# Patient Record
Sex: MALE | Race: WHITE | NOT HISPANIC OR LATINO | Employment: FULL TIME | URBAN - METROPOLITAN AREA
[De-identification: names, ages, dates, MRNs, and addresses within clinical notes are randomized per-mention and may not be internally consistent; named-entity substitution may affect disease eponyms.]

---

## 2022-11-28 ENCOUNTER — LAB REQUISITION (OUTPATIENT)
Dept: LAB | Facility: CLINIC | Age: 27
End: 2022-11-28
Payer: COMMERCIAL

## 2022-11-28 DIAGNOSIS — Z13.1 ENCOUNTER FOR SCREENING FOR DIABETES MELLITUS: ICD-10-CM

## 2022-11-28 DIAGNOSIS — Z13.6 ENCOUNTER FOR SCREENING FOR CARDIOVASCULAR DISORDERS: ICD-10-CM

## 2022-11-28 DIAGNOSIS — Z11.4 ENCOUNTER FOR SCREENING FOR HUMAN IMMUNODEFICIENCY VIRUS (HIV): ICD-10-CM

## 2022-11-28 LAB
CHOLEST SERPL-MCNC: 200 MG/DL
FASTING STATUS PATIENT QL REPORTED: NORMAL
GLUCOSE SERPL-MCNC: 91 MG/DL (ref 70–99)
HDLC SERPL-MCNC: 36 MG/DL
HIV 1+2 AB+HIV1 P24 AG SERPL QL IA: NONREACTIVE
LDLC SERPL CALC-MCNC: 96 MG/DL
NONHDLC SERPL-MCNC: 164 MG/DL
TRIGL SERPL-MCNC: 341 MG/DL

## 2022-11-28 PROCEDURE — 87389 HIV-1 AG W/HIV-1&-2 AB AG IA: CPT | Mod: ORL | Performed by: STUDENT IN AN ORGANIZED HEALTH CARE EDUCATION/TRAINING PROGRAM

## 2022-11-28 PROCEDURE — 82947 ASSAY GLUCOSE BLOOD QUANT: CPT | Mod: ORL | Performed by: STUDENT IN AN ORGANIZED HEALTH CARE EDUCATION/TRAINING PROGRAM

## 2022-11-28 PROCEDURE — 80061 LIPID PANEL: CPT | Mod: ORL | Performed by: STUDENT IN AN ORGANIZED HEALTH CARE EDUCATION/TRAINING PROGRAM

## 2023-11-24 ENCOUNTER — LAB REQUISITION (OUTPATIENT)
Dept: LAB | Facility: CLINIC | Age: 28
End: 2023-11-24
Payer: COMMERCIAL

## 2023-11-24 DIAGNOSIS — Z11.3 ENCOUNTER FOR SCREENING FOR INFECTIONS WITH A PREDOMINANTLY SEXUAL MODE OF TRANSMISSION: ICD-10-CM

## 2023-11-24 PROCEDURE — 86593 SYPHILIS TEST NON-TREP QUANT: CPT | Mod: ORL | Performed by: STUDENT IN AN ORGANIZED HEALTH CARE EDUCATION/TRAINING PROGRAM

## 2023-11-24 PROCEDURE — 86592 SYPHILIS TEST NON-TREP QUAL: CPT | Mod: ORL | Performed by: STUDENT IN AN ORGANIZED HEALTH CARE EDUCATION/TRAINING PROGRAM

## 2023-11-24 PROCEDURE — 87491 CHLMYD TRACH DNA AMP PROBE: CPT | Mod: ORL | Performed by: STUDENT IN AN ORGANIZED HEALTH CARE EDUCATION/TRAINING PROGRAM

## 2023-11-24 PROCEDURE — 86780 TREPONEMA PALLIDUM: CPT | Mod: ORL | Performed by: STUDENT IN AN ORGANIZED HEALTH CARE EDUCATION/TRAINING PROGRAM

## 2023-11-24 PROCEDURE — 87389 HIV-1 AG W/HIV-1&-2 AB AG IA: CPT | Mod: ORL | Performed by: STUDENT IN AN ORGANIZED HEALTH CARE EDUCATION/TRAINING PROGRAM

## 2023-11-25 LAB
C TRACH DNA SPEC QL PROBE+SIG AMP: NEGATIVE
HIV 1+2 AB+HIV1 P24 AG SERPL QL IA: NONREACTIVE
N GONORRHOEA DNA SPEC QL NAA+PROBE: NEGATIVE
T PALLIDUM AB SER QL: REACTIVE

## 2023-11-27 LAB
RPR SER QL: REACTIVE
RPR SER-TITR: NORMAL {TITER}

## 2023-12-22 ENCOUNTER — LAB REQUISITION (OUTPATIENT)
Dept: LAB | Facility: CLINIC | Age: 28
End: 2023-12-22
Payer: COMMERCIAL

## 2023-12-22 DIAGNOSIS — Z29.81 ENCOUNTER FOR HIV PRE-EXPOSURE PROPHYLAXIS: ICD-10-CM

## 2023-12-22 LAB
ANION GAP SERPL CALCULATED.3IONS-SCNC: 12 MMOL/L (ref 7–15)
BUN SERPL-MCNC: 13.2 MG/DL (ref 6–20)
CALCIUM SERPL-MCNC: 9.2 MG/DL (ref 8.6–10)
CHLORIDE SERPL-SCNC: 103 MMOL/L (ref 98–107)
CREAT SERPL-MCNC: 0.93 MG/DL (ref 0.67–1.17)
DEPRECATED HCO3 PLAS-SCNC: 25 MMOL/L (ref 22–29)
EGFRCR SERPLBLD CKD-EPI 2021: >90 ML/MIN/1.73M2
GLUCOSE SERPL-MCNC: 93 MG/DL (ref 70–99)
HBV CORE AB SERPL QL IA: NONREACTIVE
HBV SURFACE AB SERPL IA-ACNC: 5.52 M[IU]/ML
HBV SURFACE AB SERPL IA-ACNC: NONREACTIVE M[IU]/ML
HBV SURFACE AG SERPL QL IA: NONREACTIVE
HCV AB SERPL QL IA: NONREACTIVE
HIV 1+2 AB+HIV1 P24 AG SERPL QL IA: NONREACTIVE
POTASSIUM SERPL-SCNC: 4.1 MMOL/L (ref 3.4–5.3)
SODIUM SERPL-SCNC: 140 MMOL/L (ref 135–145)

## 2023-12-22 PROCEDURE — 80048 BASIC METABOLIC PNL TOTAL CA: CPT | Mod: ORL | Performed by: STUDENT IN AN ORGANIZED HEALTH CARE EDUCATION/TRAINING PROGRAM

## 2023-12-22 PROCEDURE — 86803 HEPATITIS C AB TEST: CPT | Mod: ORL | Performed by: STUDENT IN AN ORGANIZED HEALTH CARE EDUCATION/TRAINING PROGRAM

## 2023-12-22 PROCEDURE — 87340 HEPATITIS B SURFACE AG IA: CPT | Mod: ORL | Performed by: STUDENT IN AN ORGANIZED HEALTH CARE EDUCATION/TRAINING PROGRAM

## 2023-12-22 PROCEDURE — 87389 HIV-1 AG W/HIV-1&-2 AB AG IA: CPT | Mod: ORL | Performed by: STUDENT IN AN ORGANIZED HEALTH CARE EDUCATION/TRAINING PROGRAM

## 2023-12-22 PROCEDURE — 87491 CHLMYD TRACH DNA AMP PROBE: CPT | Mod: ORL | Performed by: STUDENT IN AN ORGANIZED HEALTH CARE EDUCATION/TRAINING PROGRAM

## 2023-12-22 PROCEDURE — 86704 HEP B CORE ANTIBODY TOTAL: CPT | Mod: ORL | Performed by: STUDENT IN AN ORGANIZED HEALTH CARE EDUCATION/TRAINING PROGRAM

## 2023-12-22 PROCEDURE — 86706 HEP B SURFACE ANTIBODY: CPT | Mod: ORL | Performed by: STUDENT IN AN ORGANIZED HEALTH CARE EDUCATION/TRAINING PROGRAM

## 2023-12-23 LAB
C TRACH DNA SPEC QL PROBE+SIG AMP: NEGATIVE
N GONORRHOEA DNA SPEC QL NAA+PROBE: NEGATIVE

## 2024-04-22 ENCOUNTER — HOSPITAL ENCOUNTER (EMERGENCY)
Facility: CLINIC | Age: 29
Discharge: OTHER INSTITUTION | End: 2024-04-23
Attending: EMERGENCY MEDICINE | Admitting: EMERGENCY MEDICINE
Payer: COMMERCIAL

## 2024-04-22 DIAGNOSIS — R45.851 SUICIDAL IDEATION: ICD-10-CM

## 2024-04-22 PROBLEM — F33.1 MAJOR DEPRESSIVE DISORDER, RECURRENT EPISODE, MODERATE (H): Status: ACTIVE | Noted: 2024-04-22

## 2024-04-22 PROBLEM — F84.0 AUTISTIC DISORDER, RESIDUAL STATE: Status: ACTIVE | Noted: 2024-04-22

## 2024-04-22 LAB
AMPHETAMINES UR QL SCN: NORMAL
BARBITURATES UR QL SCN: NORMAL
BENZODIAZ UR QL SCN: NORMAL
BZE UR QL SCN: NORMAL
CANNABINOIDS UR QL SCN: NORMAL
FENTANYL UR QL: NORMAL
OPIATES UR QL SCN: NORMAL
PCP QUAL URINE (ROCHE): NORMAL

## 2024-04-22 PROCEDURE — 80307 DRUG TEST PRSMV CHEM ANLYZR: CPT | Performed by: EMERGENCY MEDICINE

## 2024-04-22 PROCEDURE — 99285 EMERGENCY DEPT VISIT HI MDM: CPT

## 2024-04-22 PROCEDURE — 250N000013 HC RX MED GY IP 250 OP 250 PS 637: Performed by: EMERGENCY MEDICINE

## 2024-04-22 RX ORDER — LORAZEPAM 1 MG/1
1 TABLET ORAL EVERY 8 HOURS PRN
Status: DISCONTINUED | OUTPATIENT
Start: 2024-04-22 | End: 2024-04-23 | Stop reason: HOSPADM

## 2024-04-22 RX ORDER — LAMOTRIGINE 25 MG/1
25 TABLET ORAL 2 TIMES DAILY
Status: DISCONTINUED | OUTPATIENT
Start: 2024-04-22 | End: 2024-04-23

## 2024-04-22 RX ORDER — HYDROXYZINE HYDROCHLORIDE 25 MG/1
25 TABLET, FILM COATED ORAL 2 TIMES DAILY PRN
Status: ON HOLD | COMMUNITY
End: 2024-05-13

## 2024-04-22 RX ORDER — ACETAMINOPHEN 325 MG/1
650 TABLET ORAL EVERY 4 HOURS PRN
Status: DISCONTINUED | OUTPATIENT
Start: 2024-04-22 | End: 2024-04-23 | Stop reason: HOSPADM

## 2024-04-22 RX ORDER — DOXYCYCLINE 100 MG/1
100 CAPSULE ORAL 2 TIMES DAILY
Status: ON HOLD | COMMUNITY
Start: 2024-04-19 | End: 2024-05-13

## 2024-04-22 RX ORDER — PRAZOSIN HYDROCHLORIDE 2 MG/1
2 CAPSULE ORAL AT BEDTIME
COMMUNITY
End: 2024-09-25

## 2024-04-22 RX ORDER — BUPROPION HYDROCHLORIDE 150 MG/1
150 TABLET ORAL EVERY MORNING
COMMUNITY
End: 2024-04-22

## 2024-04-22 RX ORDER — LANOLIN ALCOHOL/MO/W.PET/CERES
3 CREAM (GRAM) TOPICAL
Status: DISCONTINUED | OUTPATIENT
Start: 2024-04-22 | End: 2024-04-23 | Stop reason: HOSPADM

## 2024-04-22 RX ORDER — OLANZAPINE 5 MG/1
10 TABLET, ORALLY DISINTEGRATING ORAL 2 TIMES DAILY PRN
Status: DISCONTINUED | OUTPATIENT
Start: 2024-04-22 | End: 2024-04-23 | Stop reason: HOSPADM

## 2024-04-22 RX ORDER — IBUPROFEN 600 MG/1
600 TABLET, FILM COATED ORAL EVERY 6 HOURS PRN
Status: DISCONTINUED | OUTPATIENT
Start: 2024-04-22 | End: 2024-04-23 | Stop reason: HOSPADM

## 2024-04-22 RX ORDER — HYDROXYZINE HYDROCHLORIDE 25 MG/1
25 TABLET, FILM COATED ORAL
Status: DISCONTINUED | OUTPATIENT
Start: 2024-04-22 | End: 2024-04-23

## 2024-04-22 RX ORDER — ARIPIPRAZOLE 5 MG/1
5 TABLET ORAL AT BEDTIME
COMMUNITY
End: 2024-09-25

## 2024-04-22 RX ORDER — ARIPIPRAZOLE 5 MG/1
5 TABLET ORAL AT BEDTIME
Status: DISCONTINUED | OUTPATIENT
Start: 2024-04-22 | End: 2024-04-23 | Stop reason: HOSPADM

## 2024-04-22 RX ORDER — LAMOTRIGINE 25 MG/1
25 TABLET ORAL 2 TIMES DAILY
Status: ON HOLD | COMMUNITY
End: 2024-05-13

## 2024-04-22 RX ADMIN — ARIPIPRAZOLE 5 MG: 5 TABLET ORAL at 23:10

## 2024-04-22 RX ADMIN — LAMOTRIGINE 25 MG: 25 TABLET ORAL at 23:10

## 2024-04-22 RX ADMIN — FLUOXETINE 20 MG: 20 CAPSULE ORAL at 23:11

## 2024-04-22 ASSESSMENT — ACTIVITIES OF DAILY LIVING (ADL)
ADLS_ACUITY_SCORE: 35
ADLS_ACUITY_SCORE: 33
ADLS_ACUITY_SCORE: 35
ADLS_ACUITY_SCORE: 35

## 2024-04-22 ASSESSMENT — COLUMBIA-SUICIDE SEVERITY RATING SCALE - C-SSRS
5. HAVE YOU STARTED TO WORK OUT OR WORKED OUT THE DETAILS OF HOW TO KILL YOURSELF? DO YOU INTEND TO CARRY OUT THIS PLAN?: YES
1. IN THE PAST MONTH, HAVE YOU WISHED YOU WERE DEAD OR WISHED YOU COULD GO TO SLEEP AND NOT WAKE UP?: YES
3. HAVE YOU BEEN THINKING ABOUT HOW YOU MIGHT KILL YOURSELF?: YES
6. HAVE YOU EVER DONE ANYTHING, STARTED TO DO ANYTHING, OR PREPARED TO DO ANYTHING TO END YOUR LIFE?: NO
2. HAVE YOU ACTUALLY HAD ANY THOUGHTS OF KILLING YOURSELF IN THE PAST MONTH?: YES

## 2024-04-22 NOTE — ED PROVIDER NOTES
History     Chief Complaint:  Suicidal       HPI     Pj Tucker is a 29 year old male with history of ADHD who presents to the ER for evaluation of suicidal ideation. The patient states he has been having suicidal thoughts for a long time but they have become worse recently. He reports that his thoughts have become more frequent. He states that he has a plan of jumping off a bridge but hasn't attempted it yet. He reports being very close to attempting. He has never been hospitalized in the past for his suicidal thoughts but came now because he wants help. He states that he is supposed to be taking antidepressants every day but for gets to take them some days. Denies homicidal thought, hallucinations, and alcohol use.    Independent Historian:   None - Patient Only    Review of External Notes:   None    Medications:    Fluoxetine  Prazosin  Hydroxyzine    Past Medical History:    Major depressive disorder  Autism    Past Surgical History:    No past surgical history on file.     Physical Exam   Patient Vitals for the past 24 hrs:   BP Temp Temp src Pulse Resp SpO2 Weight   04/22/24 1553 -- -- -- -- -- -- 113.5 kg (250 lb 3.6 oz)   04/22/24 1550 (!) 146/100 98.5  F (36.9  C) Temporal 108 18 95 % --        Physical Exam    HEENT:    Oropharynx is moist  Eyes:    Conjunctiva normal  Neck:     Supple, no meningismus.     CV:     Regular rate and rhythm.      No murmurs, rubs or gallops.    PULM:    Clear to auscultation bilateral.       No respiratory distress.      Good air exchange.  ABD:    Soft, non-tender, non-distended.       No rebound, guarding or rigidity.  MSK:     No gross deformity to all four extremities.   LYMPH:   No cervical lymphadenopathy.  NEURO:   Alert and oriented x 3.      Speech is clear with no aphasia.     Normal muscular tone, no tremor.  Skin:    Warm, dry and intact.    Psych:    Mood is depressed, affect is appropriate and congruent.     + suicidal ideation.     No delusions,  hallucinations.     Memory intact.      Emergency Department Course       Laboratory:  Labs Ordered and Resulted from Time of ED Arrival to Time of ED Departure - No data to display     Emergency Department Course & Assessments:    Interventions:  Medications   acetaminophen (TYLENOL) tablet 650 mg (has no administration in time range)   ibuprofen (ADVIL/MOTRIN) tablet 600 mg (has no administration in time range)   LORazepam (ATIVAN) tablet 1 mg (has no administration in time range)   OLANZapine zydis (zyPREXA) ODT tab 10 mg (has no administration in time range)   melatonin tablet 3 mg (has no administration in time range)   hydrOXYzine HCl (ATARAX) tablet 25 mg (has no administration in time range)        Assessments:   I obtained history and performed physical exam as noted above.     Independent Interpretation (X-rays, CTs, rhythm strip):  None    Consultations/Discussion of Management or Tests:    Consult with DEC -plan for inpatient psychiatric treatment       Social Determinants of Health affecting care:   None    Disposition:  Changed over to oncoming ED physician Dr. Espinoza    Impression & Plan      Medical Decision Makin-year-old male presents with suicidal ideation.  Patient has escalation of suicidal ideation with definitive plan of jumping off a bridge.  He cannot contract for safety.  Patient is agreeable to inpatient psychiatric treatment.  Patient evaluated by DEC who agrees with inpatient psychiatric bed placement.  Patient currently awaiting bed placement and will be changed over to oncoming ED physician.      Diagnosis:    ICD-10-CM    1. Suicidal ideation  R45.851            Discharge Medications:  New Prescriptions    No medications on file          Scribe Disclosure:  Maureen BRAVO, am serving as a scribe at 4:14 PM on 2024 to document services personally performed by Sigifredo Dhaliwal MD based on my observations and the provider's statements to me.     2024    Sigifredo Dhaliwal MD Matthews, Jeremiah R, MD  04/22/24 1782

## 2024-04-22 NOTE — ED TRIAGE NOTES
Pt arrives from home with increasing thoughts of SI. Pt has had these feeling for a few months but have gotten worse over the weekend. Pt has a plan but is not forthcoming with information on specific plan. Pt cooperative and states his therapist knows he was coming in today. VSS, A&Ox4     Triage Assessment (Adult)       Row Name 04/22/24 4847          Triage Assessment    Airway WDL WDL        Respiratory WDL    Respiratory WDL WDL        Skin Circulation/Temperature WDL    Skin Circulation/Temperature WDL WDL        Cardiac WDL    Cardiac WDL WDL        Peripheral/Neurovascular WDL    Peripheral Neurovascular WDL WDL        Cognitive/Neuro/Behavioral WDL    Cognitive/Neuro/Behavioral WDL WDL

## 2024-04-22 NOTE — ED NOTES
Pt has been searched by security. One belonging bag with keys, wallet, and shoes in DEC office. Pt allowed to keep phone, charging bank, and charging cord. Writer enforced to pt that we have the right to confiscate any of these belongings if he chooses to be unsafe or inappropriate with them.

## 2024-04-23 ENCOUNTER — TELEPHONE (OUTPATIENT)
Dept: BEHAVIORAL HEALTH | Facility: CLINIC | Age: 29
End: 2024-04-23
Payer: COMMERCIAL

## 2024-04-23 ENCOUNTER — HOSPITAL ENCOUNTER (INPATIENT)
Facility: CLINIC | Age: 29
LOS: 22 days | Discharge: PSYCHIATRIC OR SUBSTANCE USE FACILITY | DRG: 885 | End: 2024-05-15
Attending: PSYCHIATRY & NEUROLOGY | Admitting: STUDENT IN AN ORGANIZED HEALTH CARE EDUCATION/TRAINING PROGRAM
Payer: COMMERCIAL

## 2024-04-23 VITALS
DIASTOLIC BLOOD PRESSURE: 92 MMHG | HEIGHT: 68 IN | SYSTOLIC BLOOD PRESSURE: 133 MMHG | BODY MASS INDEX: 36.85 KG/M2 | WEIGHT: 243.17 LBS | RESPIRATION RATE: 20 BRPM | HEART RATE: 81 BPM | TEMPERATURE: 98.5 F | OXYGEN SATURATION: 97 %

## 2024-04-23 DIAGNOSIS — F33.1 MAJOR DEPRESSIVE DISORDER, RECURRENT EPISODE, MODERATE (H): Primary | ICD-10-CM

## 2024-04-23 PROBLEM — R45.851 SUICIDAL IDEATION: Status: ACTIVE | Noted: 2024-04-23

## 2024-04-23 LAB
ALBUMIN SERPL BCG-MCNC: 4.4 G/DL (ref 3.5–5.2)
ALP SERPL-CCNC: 61 U/L (ref 40–150)
ALT SERPL W P-5'-P-CCNC: 35 U/L (ref 0–70)
ANION GAP SERPL CALCULATED.3IONS-SCNC: 11 MMOL/L (ref 7–15)
AST SERPL W P-5'-P-CCNC: 22 U/L (ref 0–45)
BILIRUB SERPL-MCNC: 0.4 MG/DL
BUN SERPL-MCNC: 10.8 MG/DL (ref 6–20)
CALCIUM SERPL-MCNC: 9 MG/DL (ref 8.6–10)
CHLORIDE SERPL-SCNC: 102 MMOL/L (ref 98–107)
CREAT SERPL-MCNC: 0.98 MG/DL (ref 0.67–1.17)
DEPRECATED HCO3 PLAS-SCNC: 26 MMOL/L (ref 22–29)
EGFRCR SERPLBLD CKD-EPI 2021: >90 ML/MIN/1.73M2
ERYTHROCYTE [DISTWIDTH] IN BLOOD BY AUTOMATED COUNT: 12.3 % (ref 10–15)
FLUAV RNA SPEC QL NAA+PROBE: NEGATIVE
FLUBV RNA RESP QL NAA+PROBE: NEGATIVE
GLUCOSE SERPL-MCNC: 95 MG/DL (ref 70–99)
HCT VFR BLD AUTO: 41.5 % (ref 40–53)
HGB BLD-MCNC: 14.1 G/DL (ref 13.3–17.7)
MCH RBC QN AUTO: 28.7 PG (ref 26.5–33)
MCHC RBC AUTO-ENTMCNC: 34 G/DL (ref 31.5–36.5)
MCV RBC AUTO: 85 FL (ref 78–100)
PLATELET # BLD AUTO: 216 10E3/UL (ref 150–450)
POTASSIUM SERPL-SCNC: 3.7 MMOL/L (ref 3.4–5.3)
PROT SERPL-MCNC: 7.5 G/DL (ref 6.4–8.3)
RBC # BLD AUTO: 4.91 10E6/UL (ref 4.4–5.9)
RSV RNA SPEC NAA+PROBE: NEGATIVE
SARS-COV-2 RNA RESP QL NAA+PROBE: NEGATIVE
SODIUM SERPL-SCNC: 139 MMOL/L (ref 135–145)
WBC # BLD AUTO: 7.9 10E3/UL (ref 4–11)

## 2024-04-23 PROCEDURE — 99285 EMERGENCY DEPT VISIT HI MDM: CPT

## 2024-04-23 PROCEDURE — 80053 COMPREHEN METABOLIC PANEL: CPT | Performed by: EMERGENCY MEDICINE

## 2024-04-23 PROCEDURE — 250N000013 HC RX MED GY IP 250 OP 250 PS 637

## 2024-04-23 PROCEDURE — 87637 SARSCOV2&INF A&B&RSV AMP PRB: CPT | Performed by: EMERGENCY MEDICINE

## 2024-04-23 PROCEDURE — 99223 1ST HOSP IP/OBS HIGH 75: CPT | Mod: AI | Performed by: STUDENT IN AN ORGANIZED HEALTH CARE EDUCATION/TRAINING PROGRAM

## 2024-04-23 PROCEDURE — 250N000013 HC RX MED GY IP 250 OP 250 PS 637: Performed by: EMERGENCY MEDICINE

## 2024-04-23 PROCEDURE — 36415 COLL VENOUS BLD VENIPUNCTURE: CPT | Performed by: EMERGENCY MEDICINE

## 2024-04-23 PROCEDURE — 85027 COMPLETE CBC AUTOMATED: CPT | Performed by: EMERGENCY MEDICINE

## 2024-04-23 PROCEDURE — 124N000002 HC R&B MH UMMC

## 2024-04-23 RX ORDER — LAMOTRIGINE 100 MG/1
50 TABLET ORAL DAILY
Status: DISCONTINUED | OUTPATIENT
Start: 2024-04-24 | End: 2024-04-23

## 2024-04-23 RX ORDER — ACETAMINOPHEN 325 MG/1
325 TABLET ORAL EVERY 4 HOURS PRN
Status: DISCONTINUED | OUTPATIENT
Start: 2024-04-23 | End: 2024-05-15 | Stop reason: HOSPADM

## 2024-04-23 RX ORDER — POLYETHYLENE GLYCOL 3350 17 G/17G
17 POWDER, FOR SOLUTION ORAL DAILY PRN
Status: DISCONTINUED | OUTPATIENT
Start: 2024-04-23 | End: 2024-05-15 | Stop reason: HOSPADM

## 2024-04-23 RX ORDER — OLANZAPINE 10 MG/2ML
5 INJECTION, POWDER, FOR SOLUTION INTRAMUSCULAR 3 TIMES DAILY PRN
Status: DISCONTINUED | OUTPATIENT
Start: 2024-04-23 | End: 2024-05-15 | Stop reason: HOSPADM

## 2024-04-23 RX ORDER — ARIPIPRAZOLE 5 MG/1
5 TABLET ORAL AT BEDTIME
Status: CANCELLED | OUTPATIENT
Start: 2024-04-23

## 2024-04-23 RX ORDER — HYDROXYZINE HYDROCHLORIDE 25 MG/1
25 TABLET, FILM COATED ORAL 2 TIMES DAILY PRN
Status: DISCONTINUED | OUTPATIENT
Start: 2024-04-23 | End: 2024-05-15 | Stop reason: HOSPADM

## 2024-04-23 RX ORDER — PRAZOSIN HYDROCHLORIDE 1 MG/1
2 CAPSULE ORAL AT BEDTIME
Status: CANCELLED | OUTPATIENT
Start: 2024-04-23

## 2024-04-23 RX ORDER — LAMOTRIGINE 25 MG/1
25 TABLET ORAL 2 TIMES DAILY
Status: DISCONTINUED | OUTPATIENT
Start: 2024-04-23 | End: 2024-04-25

## 2024-04-23 RX ORDER — LAMOTRIGINE 25 MG/1
25 TABLET ORAL DAILY
Status: DISCONTINUED | OUTPATIENT
Start: 2024-04-24 | End: 2024-04-23 | Stop reason: HOSPADM

## 2024-04-23 RX ORDER — OLANZAPINE 5 MG/1
5 TABLET ORAL 3 TIMES DAILY PRN
Status: DISCONTINUED | OUTPATIENT
Start: 2024-04-23 | End: 2024-05-15 | Stop reason: HOSPADM

## 2024-04-23 RX ORDER — PRAZOSIN HYDROCHLORIDE 1 MG/1
2 CAPSULE ORAL AT BEDTIME
Status: DISCONTINUED | OUTPATIENT
Start: 2024-04-23 | End: 2024-04-23 | Stop reason: HOSPADM

## 2024-04-23 RX ORDER — PRAZOSIN HYDROCHLORIDE 2 MG/1
2 CAPSULE ORAL AT BEDTIME
Status: DISCONTINUED | OUTPATIENT
Start: 2024-04-23 | End: 2024-05-08

## 2024-04-23 RX ORDER — HYDROXYZINE HYDROCHLORIDE 25 MG/1
25 TABLET, FILM COATED ORAL 3 TIMES DAILY PRN
Status: DISCONTINUED | OUTPATIENT
Start: 2024-04-23 | End: 2024-04-23 | Stop reason: HOSPADM

## 2024-04-23 RX ORDER — ARIPIPRAZOLE 5 MG/1
5 TABLET ORAL AT BEDTIME
Status: DISCONTINUED | OUTPATIENT
Start: 2024-04-23 | End: 2024-04-29

## 2024-04-23 RX ADMIN — ARIPIPRAZOLE 5 MG: 5 TABLET ORAL at 21:30

## 2024-04-23 RX ADMIN — HYDROXYZINE HYDROCHLORIDE 25 MG: 25 TABLET, FILM COATED ORAL at 21:30

## 2024-04-23 RX ADMIN — LAMOTRIGINE 25 MG: 25 TABLET ORAL at 21:30

## 2024-04-23 RX ADMIN — PRAZOSIN HYDROCHLORIDE 2 MG: 2 CAPSULE ORAL at 21:30

## 2024-04-23 RX ADMIN — LAMOTRIGINE 25 MG: 25 TABLET ORAL at 08:10

## 2024-04-23 RX ADMIN — FLUOXETINE HYDROCHLORIDE 20 MG: 20 CAPSULE ORAL at 21:30

## 2024-04-23 ASSESSMENT — ACTIVITIES OF DAILY LIVING (ADL)
ADLS_ACUITY_SCORE: 35
ADLS_ACUITY_SCORE: 35
WEAR_GLASSES_OR_BLIND: YES
ADLS_ACUITY_SCORE: 30
ADLS_ACUITY_SCORE: 35
DIFFICULTY_EATING/SWALLOWING: NO
ADLS_ACUITY_SCORE: 35
ADLS_ACUITY_SCORE: 30
ADLS_ACUITY_SCORE: 35
ADLS_ACUITY_SCORE: 45
FALL_HISTORY_WITHIN_LAST_SIX_MONTHS: NO
ADLS_ACUITY_SCORE: 35
CHANGE_IN_FUNCTIONAL_STATUS_SINCE_ONSET_OF_CURRENT_ILLNESS/INJURY: NO
ADLS_ACUITY_SCORE: 35
DIFFICULTY_COMMUNICATING: NO
DRESSING/BATHING_DIFFICULTY: NO
ADLS_ACUITY_SCORE: 35
ADLS_ACUITY_SCORE: 30
ADLS_ACUITY_SCORE: 30
ADLS_ACUITY_SCORE: 35
DOING_ERRANDS_INDEPENDENTLY_DIFFICULTY: NO
ADLS_ACUITY_SCORE: 35
ADLS_ACUITY_SCORE: 35
TOILETING_ISSUES: NO
WALKING_OR_CLIMBING_STAIRS_DIFFICULTY: NO
CONCENTRATING,_REMEMBERING_OR_MAKING_DECISIONS_DIFFICULTY: NO

## 2024-04-23 ASSESSMENT — LIFESTYLE VARIABLES: SKIP TO QUESTIONS 9-10: 1

## 2024-04-23 NOTE — ED PROVIDER NOTES
Patient's care was signed out to me that he was voluntary admission for suicidal ideation.  The patient got a bed at [facility] and will be transferred by [facility] paramedics.  His care was signed out to Dr. Mccoy pending transfer.  No events during my shift.     Morelia Garcia MD  04/23/24 3305

## 2024-04-23 NOTE — ED PROVIDER NOTES
[facility] care 2155    [facility] rec completed and medication orders entered by [facility].    No acute events.     Lurdes Espinoza MD  04/23/24 5443

## 2024-04-23 NOTE — ED NOTES
Room search performed, removed vitals machine. Pt reports having suicidal thoughts with no plan.

## 2024-04-23 NOTE — CONSULTS
"Diagnostic Evaluation Consultation  Crisis Assessment    Patient Name: Pj Tucker  Age:  29 year old  Legal Sex: male  Gender Identity: male  Pronouns:   Race: White  Ethnicity: Not  or   Language: English      Patient was assessed: Virtual: [place] Crisis Assessment Start Time: 1830 Crisis Assessment Stop Time: 1903  Patient location: [facility] EMERGENCY DEPT                             ED08    Referral Data and Chief Complaint  Pj Tucker presents to the ED by  self. Patient is presenting to the ED for the following concerns: Suicidal ideation.   Factors that make the mental health crisis life threatening or complex are:  Pt presents for suicide ideation with a plan. Pt reports he has been struggling with suicide ideation for the past 2-3 months and reports the trigger to the suicide ideation, \"I have financial issues and I just feel like a burden to everyone in my life.\" Pt reports he has struggled with this for years, but has not told anyone until recently, \"I have been silently suffering for years, but have been opening up to my therapist and I told my parents as well.\" Pt reports he has a plan to jump off a bridge, and wants to do this at night, \"when no one is around. It's too crowded in the daytime.\" Pt reports he feels he is really impulsive naturally, and is starting to not trust himself. Pt reports he is worried he would leave his home and attempt to jump off the bridge. Pt reports he was at therapy earlier today and was encouraged to come to the hospital. Pt denies self-harm. Pt presents as flat, oriented, engaged, and anxious..      Informed Consent and Assessment Methods  Explained the crisis assessment process, including applicable information disclosures and limits to confidentiality, assessed understanding of the process, and obtained consent to proceed with the assessment.  Assessment methods included conducting a formal interview with patient, review of " medical records, collaboration with medical staff, and obtaining relevant collateral information from family and community providers when available.  : done     Patient response to interventions: acceptance expressed, verbalizes understanding  Coping skills were attempted to reduce the crisis:  talked to therapist and      History of the Crisis   Pt reports history of ADHD and Autism. Pt reports he is currently involved in therapy, case management, PCP, and psychiatry for medication review. Pt denies history of ED visits or inpatient psychiatric care for mental health concerns. Pt denies history of hallucinations or delusions. Pt reports history of of suicide ideation with plan, but did not follow through with his plan to jump off a bridge. Pt denies history of self-harm.    Brief Psychosocial History  Family:  Single, Children no  Support System:  Parent(s), Other (specify) (therapist, medication provider, PCP)  Employment Status:  employed full-time  Source of Income:  salary/wages  Financial Environmental Concerns:  other (see comments) (struggling to pay car payment)  Current Hobbies:  outdoor activities, exercise/fitness, spectator events, sports/team sports, group/social activities  Barriers in Personal Life:  financial concerns, mental health concerns    Significant Clinical History  Current Anxiety Symptoms:  anxious  Current Depression/Trauma:  helplessness, apathy, thoughts of death/suicide, hopelessness, sadness, difficulty concentrating, low self esteem, impaired decision making  Current Somatic Symptoms:     Current Psychosis/Thought Disturbance:     Current Eating Symptoms:     Chemical Use History:  Alcohol: Social  Last Use:: 02/08/24  Benzodiazepines: None  Opiates: None  Cocaine: None  Marijuana: None  Other Use: None   Past diagnosis:  Autism, ADHD  Family history:  ADHD, Autism  Past treatment:  Psychiatric Medication Management, Individual therapy, Case management, Primary  Care  Details of most recent treatment:  Pt reports he attends weekly therapy with Mindy @ [facility]. Pt reports he saw his therapist today, prior to coming to the ED. Pt reports he has a  thru [facility], Elbert Heard @ [facility]. Pt last spoke with his  today. Pt reports his medications are managed by a psychiatrist, Jazmin @ [facility]. Pt reports he last saw his psychiatrist last week for a medication review.  Other relevant history:  Pt reports he rents a room from a home with an older couple and their dog and guinea pigs. Pt reports he works full-time in a [employer] and reports he feels neutral about his employment. Pt denies history of legal issues and  involvement. Pt denies history of trauma.       Collateral Information  Is there collateral information: No (No emergency contact listed and pt does not recall phone numbers)     Collateral information name, relationship, phone number:       What happened today:       What is different about patient's functioning:       Concern about alcohol/drug use:      What do you think the patient needs:      Has patient made comments about wanting to kill themselves/others:      If d/c is recommended, can they take part in safety/aftercare planning:       Additional collateral information:        Risk Assessment  [place] Suicide Severity Rating Scale Full Clinical Version:  Suicidal Ideation  Q1 Wish to be Dead (Lifetime): Yes  Q2 Non-Specific Active Suicidal Thoughts (Lifetime): Yes  3. Active Suicidal Ideation with any Methods (Not Plan) Without Intent to Act (Lifetime): Yes  Q4 Active Suicidal Ideation with Some Intent to Act, Without Specific Plan (Lifetime): Yes  Q5 Active Suicidal Ideation with Specific Plan and Intent (Lifetime): Yes  Q6 Suicide Behavior (Lifetime): yes     Suicidal Behavior (Lifetime)  Actual Attempt (Lifetime): No  Has subject engaged in  non-suicidal self-injurious behavior? (Lifetime): No  Interrupted Attempts (Lifetime): No  Aborted or Self-Interrupted Attempt (Lifetime): No  Preparatory Acts or Behavior (Lifetime): Yes  Total Number of Preparatory Acts (Lifetime): 1  Preparatory Acts or Behavior Description (Lifetime): identified time of day, and place    [place] Suicide Severity Rating Scale Recent:   Suicidal Ideation (Recent)  Q1 Wished to be Dead (Past Month): yes  Q2 Suicidal Thoughts (Past Month): yes  Q3 Suicidal Thought Method: yes  Q4 Suicidal Intent without Specific Plan: yes  Q5 Suicide Intent with Specific Plan: yes  Level of Risk per Screen: high risk  Intensity of Ideation (Recent)  Most Severe Ideation Rating (Past 1 Month): 5  Frequency (Past 1 Month): Many times each day  Duration (Past 1 Month): More than 8 hours/persistent or continuous  Controllability (Past 1 Month): Unable to control thoughts  Deterrents (Past 1 Month): Uncertain that deterrents stopped you  Reasons for Ideation (Past 1 Month): Mostly to end or stop the pain (You couldn't go on living with the pain or how you were feeling)  Suicidal Behavior (Recent)  Actual Attempt (Past 3 Months): No  Has subject engaged in non-suicidal self-injurious behavior? (Past 3 Months): No  Interrupted Attempts (Past 3 Months): No  Aborted or Self-Interrupted Attempt (Past 3 Months): No  Preparatory Acts or Behavior (Past 3 Months): Yes  Preparatory Acts or Behavior Description (Past 3 Months): identified time of day and place    Environmental or Psychosocial Events: excessive debt, poor finances, other life stressors, social isolation, helplessness/hopelessness, impulsivity/recklessness  Protective Factors: Protective Factors: lives in a responsibly safe and stable environment, good treatment engagement, sense of importance of health and wellness, able to access care without barriers, supportive ongoing medical and mental health care relationships, help seeking, constructive use  of leisure time, enjoyable activities, resilience    Does the patient have thoughts of harming others? Feels Like Hurting Others: no  Previous Attempt to Hurt Others: no  Is the patient engaging in sexually inappropriate behavior?: no    Is the patient engaging in sexually inappropriate behavior?  no        Mental Status Exam   Affect: Flat  Appearance: Appropriate  Attention Span/Concentration: Attentive  Eye Contact: Engaged    Fund of Knowledge: Appropriate   Language /Speech Content: Fluent  Language /Speech Volume: Normal  Language /Speech Rate/Productions: Normal  Recent Memory: Intact  Remote Memory: Intact  Mood: Anxious  Orientation to Person: Yes   Orientation to Place: Yes  Orientation to Time of Day: Yes  Orientation to Date: Yes     Situation (Do they understand why they are here?): Yes  Psychomotor Behavior: Normal  Thought Content: Suicidal  Thought Form: Intact     Mini-Cog Assessment  Number of Words Recalled:    Clock-Drawing Test:     Three Item Recall:    Mini-Cog Total Score:       Medication  Psychotropic medications:   Medication Orders - Psychiatric (From admission, onward)      Start     Dose/Rate Route Frequency Ordered Stop    04/22/24 2038  hydrOXYzine HCl (ATARAX) tablet 25 mg         25 mg Oral AT BEDTIME PRN 04/22/24 2038 04/22/24 2038  OLANZapine zydis (zyPREXA) ODT tab 10 mg         10 mg Oral 2 TIMES DAILY PRN 04/22/24 2038 04/22/24 2038  LORazepam (ATIVAN) tablet 1 mg         1 mg Oral EVERY 8 HOURS PRN 04/22/24 2038               Current Care Team  Patient Care Team:  Cl[facility], [facility] as PCP - General (Certified Registered)    Diagnosis  Patient Active Problem List   Diagnosis    Major depressive disorder, recurrent episode, moderate (H)    Autistic disorder, residual state       Primary Problem This Admission  Active Hospital Problems    *Major depressive disorder, recurrent episode, moderate (H)      Autistic disorder, residual state        Clinical  Summary and Substantiation of Recommendations   It is the recommendation of this clinician that pt admit to [facility] for safety and stabilization. Pt displays the following risk factors that support IP admission: Pt presents with suicide ideation with plan to jump off a bridge at night, with means, and intent. Pt reports he has been feeling this way for 2-3 months, but now feels impulsive and can no longer trust himself. Pt recently had a medication review, where a medication was added 1 week ago. Pt has limited social supports and has not told family about suicide ideation until today, after therapy. Pt is unable to engage in safety planning to mitigate risk level in a non-secure setting. Lower levels of care are not sufficient. Due to this IP is the least restrictive option of care for pt. Pt should remain in IP until deemed safe to return to the community and engage in [facility] supports. Pt would benefit from outpatient programmatic care with DBT referral.       Imminent risk of harm: Suicidal Behavior  Severe psychiatric, behavioral or other comorbid conditions are appropriate for management at inpatient mental health as indicated by at least one of the following: Impaired impulse control, judgement, or insight, Psychiatric Symptoms  Severe dysfunction in daily living is present as indicated by at least one of the following: Complete withdrawal from all social interactions, Other evidence of severe dysfunction  Situation and expectations are appropriate for inpatient care: Biopsychosocial stresses potentially contributing to clinical presentation (co morbidities) have been assessed and are absent or manageable at proposed level of care  Inpatient mental health services are necessary to meet patient needs and at least one of the following: Specific condition related to admission diagnosis is present and judged likely to further improve at proposed level of care      Patient coping skills attempted to reduce the crisis:   talked to therapist and     Disposition  Recommended disposition: Inpatient Mental Health        Reviewed case and recommendations with attending provider. Attending Name: Dr. Dhaliwal       Attending concurs with disposition: yes       Patient and/or validated legal guardian concurs with disposition:   yes       Final disposition:  inpatient mental health    Legal status on admission: Voluntary/Patient has signed consent for treatment    Assessment Details   Total duration spent with the patient: 33 min     CPT code(s) utilized: 18653 - Psychotherapy for Crisis - 60 (30-74*) min    NOBLE Singletary, ANGELC, Psychotherapist  DEC - Triage & Transition Services  Callback: [phone]

## 2024-04-23 NOTE — ED NOTES
RN ED Mental Health Handoff Note    Voluntary    Does patient require 1:1? Yes    Hold and rights been given and documented for patient: Yes    Is the patient in BH scrubs? No -na    Has the patient been searched? Yes    Is the 15 minute observation tool up to date? Yes    Was patient issued a welcome folder? Yes    Room check completed this shift: No    PSS3 and [place] Assessment/Reassessment this shift:    C-SSRS ([place])      Date and Time Q1 Wished to be Dead (Past Month) Q2 Suicidal Thoughts (Past Month) Q3 Suicidal Thought Method Q4 Suicidal Intent without Specific Plan Q5 Suicide Intent with Specific Plan Q6 Suicide Behavior (Lifetime) Within the Past 3 Months? Level of Risk per Screen Level of Risk per Screen User   04/22/24 1937 1-->yes 1-->yes 1-->yes 1-->yes 1-->yes -- -- -- high risk LF   04/22/24 1936 -- -- -- -- -- 1-->yes -- -- -- LF   04/22/24 1609 1-->yes 1-->yes 1-->yes -- 1-->yes 0-->no -- -- high risk RD   04/22/24 1551 1-->yes 1-->yes 1-->yes -- 1-->yes 0-->no -- -- high risk NAW            Behavioral status of patient: Green    Code 21 called this shift? No    Use of restraints/seclusion this shift? No    Most recent vital signs:  Temp: 98.5  F (36.9  C) Temp src: Temporal BP: (!) 146/100 Pulse: 108   Resp: 18 SpO2: 95 %        Medications:  Scheduled medication compliance? Yes    PRN Meds administered this shift? No    Medications   acetaminophen (TYLENOL) tablet 650 mg (has no administration in time range)   ibuprofen (ADVIL/MOTRIN) tablet 600 mg (has no administration in time range)   LORazepam (ATIVAN) tablet 1 mg (has no administration in time range)   OLANZapine zydis (zyPREXA) ODT tab 10 mg (has no administration in time range)   melatonin tablet 3 mg (has no administration in time range)   hydrOXYzine HCl (ATARAX) tablet 25 mg (has no administration in time range)   ARIPiprazole (ABILIFY) tablet 5 mg (5 mg Oral $Given 4/22/24 4171)   FLUoxetine (PROzac) capsule 20 mg (20 mg Oral  $Given 4/22/24 2311)   lamoTRIgine (LaMICtal) tablet 25 mg (25 mg Oral $Given 4/22/24 1730)         ADLs    Meal Provided this shift? Yes    Hygiene items provided? Yes    ADLs completed? Yes    Date of last shower: unknown    Any significant events this shift? No    Any information that would be helpful in caring for this patient?  No    Family present/updated? Yes    Location of patient's belongings: in DEC office.  Pt has phone and  in room.     Critical Care Minutes:  Does the patient need critical care minutes documented? No

## 2024-04-23 NOTE — PROGRESS NOTES
"Triage & Transition Services, Extended Care     Therapy Progress Note    Patient: Pj goes by \"Pj,\" uses he/him pronouns  Date of Service: April 23, 2024  Site of Service: [facility] EMERGENCY DEPT                             ED08  Patient was seen yes  Mode of Assessment: In person    Presentation Summary: Writer met with the patient alongside psychiatry provider YUDITH Castro, CNP.  Writer entered the patient's room, introduced self and explained role.  Patient was awake and alert, sitting up on the gurney.  Patient spoke aboiut worsening depression, he said he has been expereincing SI for a few years now, but it has worsened over the past few weeks, he reports expereincing intrusive Si with thoughts of a plan to jump off of a bridge.  Patient said he has lived with an older couple who are in their 60s for a few years, he notes some issues at home saying he has ASD and there are \"communication issues\".  Patient said he finally started talking about si becuase \"I'm tired of suffering in silence\".  Patient cotninues to endorse SI, though denies previous attempts or psychiatric admissions.  Patient said he was previously concerned about anxiety and worsening depression, though now he is more concerned about intrusive thoughts.  Patient notes he has had periods with excessive spending and increased sexual activitiy saying he is \"bisexual\".  Patient described having thoughts of stabbing himself with a knife at home, he said the first time the sog stopped him and the second time his roommates came home.  Patient continues to endorse Si, he denies NSSI.HI.  Patient denies AH/VH, denies command hallucinations.  Reviewed plan of care with the patient, he continues to consent to treatment and is voluntary.    Therapeutic Intervention(s) Provided: Reviewed healthy living that supports positive mental health, including looking at sleep hygiene, regular movement, nutrition, and regular " socialization., Provided positive reinforcement for progress towards goals, gains in knowledge, and application of skills previously taught., Taught the link between thoughts, feelings, and behaviors., Coached on coping techniques/relaxation skills to help improve distress tolerance and managing intense emotions.    Current Symptoms: racing thoughts, anxious sense of doom, difficulty concentrating, withdrawl/isolation, impaired decision making, hoplessness, sadness, thoughts of death/suicide anxious, racing thoughts impulsive      Mental Status Exam   Affect: Flat  Appearance: Appropriate  Attention Span/Concentration: Attentive  Eye Contact: Engaged    Fund of Knowledge: Appropriate   Language /Speech Content: Fluent  Language /Speech Volume: Normal  Language /Speech Rate/Productions: Normal  Recent Memory: Intact  Remote Memory: Intact  Mood: Anxious, Depressed  Orientation to Person: Yes   Orientation to Place: Yes  Orientation to Time of Day: Yes  Orientation to Date: Yes     Situation (Do they understand why they are here?): Yes  Psychomotor Behavior: Normal  Thought Content: Suicidal  Thought Form: Intact    Treatment Objective(s) Addressed: rapport building, processing feelings, identifying an appropriate aftercare plan, assessing safety, identifying additional supports, exploring obstacles to safety in the community    Patient Response to Interventions: acceptance expressed, verbalizes understanding    Progress Towards Goals: Patient Reports Symptoms Are: ongoing  Patient Progress Toward Goals: is making progress  Comment: Patient appropriately engaged in therapeutic check-in, patient's behavior has been calm and cooperative.  Next Step to Work Toward Discharge: symptom stabilization, patient ability to engage in safety planning, engaging in safety planning with collateral sources      Plan: inpatient mental health  yes provider, RN MD- Morelia Garcia, VIRI- Nani/Lydia       Clinical Substantiation: Continue  "to recommend IP MH admission for further safety and stablization.  Patient continues to endorse SI with intrusive thoughts of jumping off of a bridge.  Per initial consult \"Pt presents with suicide ideation with plan to jump off a bridge at night, with means, and intent. Pt reports he has been feeling this way for 2-3 months, but now feels impulsive and can no longer trust himself. Pt recently had a medication review, where a medication was added 1 week ago. Pt has limited social supports and has not told family about suicide ideation until today, after therapy. Pt is unable to engage in safety planning to mitigate risk level in a non-secure setting. Lower levels of care are not sufficient. Due to this IP is the least restrictive option of care for pt. Pt should remain in IP until deemed safe to return to the community and engage in OP MH supports. Pt would benefit from outpatient programmatic care with DBT referral.\"    Legal Status: Legal Status at Admission: Voluntary/Patient has signed consent for treatment    Session Status: Time session started: 0925  Time session ended: 1000  Session Duration (minutes): 35 minutes  Session Number: 1  Anticipated number of sessions or this episode of care: 4    Time Spent: 35 minutes    CPT Code: CPT Codes: 12750 - Psychotherapy (with patient) - 30 (16-37*) min    Diagnosis:   Patient Active Problem List   Diagnosis    Major depressive disorder, recurrent episode, moderate (H)    Autistic disorder, residual state       Primary Problem This Admission:     Major depressive disorder, recurrent episode, moderate (H) F33.1    Autistic disorder, residual state F84.0    PHOENIX Villanueva   Licensed Mental Health Professional (LMHP), [facility]  [phone]         "

## 2024-04-23 NOTE — LETTER
[facility]  [address] [address]  Phone: [phone]    April 24, 2024      Pj Tucker  [address] [place] [id number]        To whom it may concern:    RE: Pj Tucker    Please excuse Pj Tucker from work beginning 4/22/2024. Pj is currently admitted at [facility] for ongoing medical treatment. An updated note will be provided upon discharge.    Please contact me for questions or concerns.    Sincerely,      Johnny Jay MD
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
## 2024-04-23 NOTE — TELEPHONE ENCOUNTER
R:  20/Sky  [facility] Access Inpatient Bed Call Log 4/23/2024 8:01:58 AM    Intake has called facilities that have not updated their bed status within the last 12 hours.    ADULTS:  [facility] is posting 0 beds.              [facility] is posting 0 beds. [phone] 8:10a No answer.    [facility] is posting 0 beds. [phone]              [facility] is posting 0 beds. [phone] 8:09a Per Wandy, at capacity.    [facility] is posting 0 beds. [phone]   [facility] (These are Young Adult beds, 18-28) is posting 0 beds. Negative COVID test required, no recent or significant aggression, violence, or sexual assault. [phone] 8:03a Per Janette, have a child bed, some adolescent and no YA.  [facility] is posting 0 beds. [phone]            [facility] is posting 0 beds. [phone]     [facility] through [facility] is posting 0 beds. [phone]         [facility] is posting 5 beds. Mixed unit 12+. Low acuity only.  DIRECT: [phone]    Per call with Rose @11:41 AM They can review Pt; please fax over clinical  [facility] is positing 0 beds. No aggression.  [phone]         [facility] is posting 0 beds. [phone] 8:14a Per pre-recording, no availability.  [facility] is posting 2 beds. Low acuity only. [phone]      [facility] is posting 1 beds. Low acuity. No current aggression. [phone]   [facility] is posting 0 beds. Low acuity. [phone]      Pt remains on work list pending appropriate bed availability.     Faxed clinical to [facility]    12:24 PM Paged unit 20 Provider    1:05 PM [facility] accepted Pt for 20 M bed    Updated worklist and added to the admit board and did Transfer Center and Bed planning and Indicia    1:42 PM Updated unit 20    1:45 PM Updated Veronica

## 2024-04-23 NOTE — TELEPHONE ENCOUNTER
S: [facility] ED , DEC  Diane  calling at 12:02am about a 29 year old/Male presenting with SI with plan to kill self by jumping off bridge at night when no one's around. Feeling impulsive and doesn't feel safe. Therapist recommended he be seen for IPMH.      B: Pt arrived via Self . Presenting problem, stressors: Financial issues has exacerbated his feelings of suicidal ideation over the years.     Pt affect in ED: Anxious  and Flat  Pt Dx: ADHD and Autism Spectrum Disorder - high functioning  Previous IPMH hx? No  Pt endorses SI with a plan to jump off a bridge    Hx of suicide attempt? No  Pt denies SIB  Pt denies HI   Pt denies hallucinations .   Pt RARS Score: 4    Hx of aggression/violence, sexual offenses, legal concerns, Epic care plan? describe: none  Current concerns for aggression this visit? No  Does pt have a history of Civil Commitment? No  Is Pt their own guardian? Yes    Pt is prescribed medication. Is patient medication compliant? Yes  Pt endorses OP services: Psychiatrist, Therapist, and   CD concerns: None  Acute or chronic medical concerns: none  Does Pt present with specific needs, assistive devices, or exclusionary criteria? None      Pt is not ambulatory  Pt is not able to perform ADLs independently      A: Pt to be reviewed for IPMH admission. Pt is Voluntary  Preferred placement: [facility]    COVID Symptoms: No  If yes, COVID test required   Utox: Negative   CMP: Ordered, not yet collected   CBC: Ordered, not yet collected   HCG: N/A    R: Patient cleared and ready for behavioral bed placement: Yes  Pt placed on IPMH worklist? Yes    Does Patient need a Transfer Center request created? Yes, writer completed Transfer Center request at:  8:39pm by ED provider

## 2024-04-23 NOTE — CONSULTS
Initial Psychiatric Consult   Consult date: April 23, 2024         Reason for Consult, requesting source:    Recommendations    Requesting source: Morelia Garcia    Labs and imaging reviewed. Consultation with Mitra Fung, tyson [facility], nursing staff and Dr. Garcia       Video-Visit Details    Type of service:  Video Visit    Video Start Time (time video started): 9:35am    Video End Time (time video stopped): 9:50am    Originating Location (pt. Location): [facility]    Distant Location (provider location):  Off-site    Mode of Communication:  Video Conference via YUDITH Issa CNP         HPI:   Per HPI from Dr. Dhaliwal on 4/22/24:  Pj Tucker is a 29 year old male with history of ADHD who presents to the ER for evaluation of suicidal ideation. The patient states he has been having suicidal thoughts for a long time but they have become worse recently. He reports that his thoughts have become more frequent. He states that he has a plan of jumping off a bridge but hasn't attempted it yet. He reports being very close to attempting. He has never been hospitalized in the past for his suicidal thoughts but came now because he wants help. He states that he is supposed to be taking antidepressants every day but for gets to take them some days. Denies homicidal thought, hallucinations, and alcohol use.    Today Pj was sitting in his room when tyson Manriquez, and I met with him. Pj drove himself to the ED due to suicidal ideation. He has been feeling this way for several years but has been more open about in the past few months. He has been reaching out for help. He recalls ongoing suicidal ideation that is often passive however in the past few days he has been having thoughts of jumping off of a bridge. He cannot identify any significant triggers. Today he is still having suicidal thoughts, specifically to jump off a bridge.  At times he has  urges to self-harm and thoughts of grabbing a knife but has stopped himself. No previous SI attempts or programmatic care prior to this.    He sees a therapist 1x per week, has been doing this for several months. Has a psychiatric medication provider that he sees monthly. Lamictal was added 1-2 weeks ago to help with mood stability. He tells me he feels like his depression is un yared control, it is just his intrusive suicidal thoughts that are currently bothersome. He also tells me that he usually misses medications, estimates that he misses them 1-3 days a week. He was unsure of his current medications aside from prazosin which he says has been helpful for nightmares. Denies AH/VH. Denies past although does reports being impulsive all of his life. When asked about past impulsivity he tells me that he has spent money on occasions, cannot provide any examples. He is bisexual and feels like he is impulsive at times however cannot further elaborate. He does present somewhat flat with limited and direct answers. He does report ongoing communication issues in his living situation due to his Autism. He rents a room from an older couple and doesn't feel like this is the best living situation given his communication challenges. He feels like IP stabilization would be helpful as he has never experienced intrusive thoughts like this with plans on how he would attempt suicide.         Past Psychiatric History:   Reports previous dx of ASD, ADHD, and depression. Denies previous SI attempts. No previous IP hospitalizations. Has established OP therapy and Psych med management that he reports he started 3 months ago.    Per DEC Assessment by Diane Beltre [facility] on 4/22/24:  Pt reports history of ADHD and Autism. Pt reports he is currently involved in therapy, case management, PCP, and psychiatry for medication review. Pt denies history of ED visits or inpatient psychiatric care for mental health concerns. Pt denies history of  hallucinations or delusions. Pt reports history of of suicide ideation with plan, but did not follow through with his plan to jump off a bridge. Pt denies history of self-harm.     Psychiatric Medication Management, Individual therapy, Case management, Primary Care. Details of most recent treatment:  Pt reports he attends weekly therapy with Mindy @ [facility]. Pt reports he saw his therapist today, prior to coming to the ED. Pt reports he has a  thru [facility], Elbert Heard @ [facility]. Pt last spoke with his  today. Pt reports his medications are managed by a psychiatrist, Jazmin @ [facility]. Pt reports he last saw his psychiatrist last week for a medication review.          Substance Use and History:   Patient reports social alcohol use. Last used alcohol 1 month ago.        Past Medical History:   PAST MEDICAL HISTORY: No past medical history on file.    PAST SURGICAL HISTORY: No past surgical history on file.          Family History:   FAMILY HISTORY: No family history on file.        Social History:   SOCIAL HISTORY:   Social History     Tobacco Use    Smoking status: Not on file    Smokeless tobacco: Not on file   Substance Use Topics    Alcohol use: Not on file     Per record review, patient is employed full-time at a [employer]. He rents a room from an older couple, he says this is not the best living situation.         Physical ROS:   The 10 point Review of Systems is negative other than noted in the HPI or here.           Medications:     Current Facility-Administered Medications   Medication Dose Route Frequency Provider Last Rate Last Admin    ARIPiprazole (ABILIFY) tablet 5 mg  5 mg Oral At Bedtime Lurdes Espinoza MD   5 mg at 04/22/24 2310    FLUoxetine (PROzac) capsule 20 mg  20 mg Oral At Bedtime Lurdes Espinoza MD   20 mg at 04/22/24 2311    lamoTRIgine (LaMICtal) tablet 25 mg   25 mg Oral BID Lurdes Espinoza MD   25 mg at 04/23/24 0810              Allergies:   No Known Allergies       Labs:     Recent Results (from the past 48 hour(s))   Urine Drug Screen Panel    Collection Time: 04/22/24 11:12 PM   Result Value Ref Range    Amphetamines Urine Screen Negative Screen Negative    Barbituates Urine Screen Negative Screen Negative    Benzodiazepine Urine Screen Negative Screen Negative    Cannabinoids Urine Screen Negative Screen Negative    Cocaine Urine Screen Negative Screen Negative    Fentanyl Qual Urine Screen Negative Screen Negative    Opiates Urine Screen Negative Screen Negative    PCP Urine Screen Negative Screen Negative   CBC with platelets    Collection Time: 04/23/24 12:29 AM   Result Value Ref Range    WBC Count 7.9 4.0 - 11.0 10e3/uL    RBC Count 4.91 4.40 - 5.90 10e6/uL    Hemoglobin 14.1 13.3 - 17.7 g/dL    Hematocrit 41.5 40.0 - 53.0 %    MCV 85 78 - 100 fL    MCH 28.7 26.5 - 33.0 pg    MCHC 34.0 31.5 - 36.5 g/dL    RDW 12.3 10.0 - 15.0 %    Platelet Count 216 150 - 450 10e3/uL   Comprehensive metabolic panel    Collection Time: 04/23/24 12:29 AM   Result Value Ref Range    Sodium 139 135 - 145 mmol/L    Potassium 3.7 3.4 - 5.3 mmol/L    Carbon Dioxide (CO2) 26 22 - 29 mmol/L    Anion Gap 11 7 - 15 mmol/L    Urea Nitrogen 10.8 6.0 - 20.0 mg/dL    Creatinine 0.98 0.67 - 1.17 mg/dL    GFR Estimate >90 >60 mL/min/1.73m2    Calcium 9.0 8.6 - 10.0 mg/dL    Chloride 102 98 - 107 mmol/L    Glucose 95 70 - 99 mg/dL    Alkaline Phosphatase 61 40 - 150 U/L    AST 22 0 - 45 U/L    ALT 35 0 - 70 U/L    Protein Total 7.5 6.4 - 8.3 g/dL    Albumin 4.4 3.5 - 5.2 g/dL    Bilirubin Total 0.4 <=1.2 mg/dL   Asymptomatic Influenza A/B, RSV, & SARS-CoV2 PCR (COVID-19) Nasopharyngeal    Collection Time: 04/23/24 12:33 AM    Specimen: Nasopharyngeal; Swab   Result Value Ref Range    Influenza A PCR Negative Negative    Influenza B PCR Negative Negative    RSV PCR Negative  "Negative    SARS CoV2 PCR Negative Negative          Physical and Psychiatric Examination:     BP (!) 133/92   Pulse 81   Temp 98.5  F (36.9  C) (Temporal)   Resp 20   Ht 1.727 m (5' 8\")   Wt 110.3 kg (243 lb 2.7 oz)   SpO2 96%   BMI 36.97 kg/m    Weight is 243 lbs 2.68 oz  Body mass index is 36.97 kg/m .    Physical Exam:  I have reviewed the physical exam as documented by by the medical team and agree with findings and assessment and have no additional findings to add at this time.    Mental Status Exam:    Appearance: awake, alert, adequately groomed, appeared as age stated, and casually dressed  Attitude:  cooperative  Eye Contact:  fair  Mood:  depressed  Affect:  mood congruent and intensity is flat  Speech:  clear, coherent, normal prosody, and short and direct answers  Psychomotor Behavior:  no evidence of tardive dyskinesia, dystonia, or tics  Thought Process:   concrete   Associations:  no loose associations  Thought Content:  no evidence of psychotic thought, active suicidal ideation present, and plan for suicide present  Insight:  good  Judgement:  intact  Oriented to:  time, person, and place  Attention Span and Concentration:  intact  Recent and Remote Memory:  intact               DSM-5 Diagnosis:   296.33 (F33.2) Major Depressive Disorder, Recurrent Episode, Severe _  Suicidal ideation   ASD  ADHD by Hx          Assessment:   Pj Tucker is a 29 year old male with history of ADHD, ASD, and depression who presents to the ED with increasing intrusive suicidal thoughts including plans to jump off a bridge. Patient endorses passive suicidal thoughts for 2-3 years however notes that he started having more active thoughts over the past few days. He cannot identify specific stressors other than ongoing challenges in his living situation due to communication difficulties. He attributes communication difficulties to be related to his ASD diagnosis. He has established OP therapy and psych med " management. He does report challenges with medication compliance. Today he continues to endorse ongoing intrusive thoughts of suicide with plans to jump off a bridge. He denies AH/VH/HI and does not appear manic. Affect is somewhat flat and responses are short and direct, this could also be due to ASD diagnosis in addition to being sx of depression. He is agreeable to IP stabilization. Following stabilization, patient would benefit from additional psychotherapy focused on skill building such as DBT.          Summary of Recommendations:   Restart lamictal at 25mg x 2 weeks given reported medication non adherence further targeting mood lability and with antidepressant effects  Continue PTA medications including prozac 20mg daily for depression, Abilify 5mg daily targeting intrusive thoughts and for antidepressant augmentation, and prazosin 2mg at bedtime for sleep/nightmares;   Could consider further dose optimization of fluoxetine and or abilify following additional medication compliance to determine perceived benefit   Hydroxyzine 25 mg three times daily as needed for anxiety  Anticipate returning to established therapy and psych med management following stabilization  Patient would benefit from DBT  Patient would benefit from transfer to inpatient mental health bed for additional observation and stabilization given ongoing intrusive thoughts including suicidal ideation with plan to jump off a bridge. Patient is currently voluntary.  Should inpatient transfer be prolonged and symptoms improve, consider reassessment to determine if patient can be further stabilized at lower level of care such as programmatic care and return to existing OP services      YUDITH Castro CNP    Consult/Liaison Psychiatry   [facility]    Contact information available via [facility] Paging/Directory  If I am not available, then ROSIE MEZA line ([phone]) should know who is covering our consult service.

## 2024-04-23 NOTE — ED NOTES
IP MH Referral Acuity Rating Score (RARS)     LMHP complete at referral to IP MH, with DEC; and, daily while awaiting IP MH placement. Call score to PPS.  CRITERIA SCORING   New 70 HH and Involuntary for IP MH (not adolescent) 0/1   Boarding over 24 hours 1/1   Vulnerable adult at least 55+ with multiple co morbidities; or, Patient age 11 or under 0/1   Suicide ideation without relief of precipitating factors 1/1   Current plan for suicide 1/1   Current plan for homicide 0/1   Imminent risk or actual attempt to seriously harm another without relief of factors precipitating the attempt 1/1   Severe dysfunction in daily living (ex: complete neglect for self care, extreme disruption in vegetative function, extreme deterioration in social interactions) 1/1   Recent (last 2 weeks) or current physical aggression in the ED 0/1   Restraints or seclusion episode in ED 0/1   Verbal aggression, agitation, yelling, etc., while in the ED 0/1   Active psychosis with psychomotor agitation or catatonia 0/1   Need for constant or near constant redirection (from leaving, from others, etc).  0/1   Intrusive or disruptive behaviors 0/1   TOTAL Acuity Total Score: 5

## 2024-04-23 NOTE — PHARMACY-ADMISSION MEDICATION HISTORY
Pharmacist Admission Medication History    Admission medication history is complete. The information provided in this note is only as accurate as the sources available at the time of the update.    Information Source(s): Patient and Patient's pharmacy via in-person    Pertinent Information:  pt states he never picked up the doxycycline.     Changes made to PTA medication list:  Added: entire list  Deleted: None  Changed: None    Allergies reviewed with patient and updates made in EHR: yes    Medication History Completed By: Reanna Rodarte [facility] 4/22/2024 10:22 PM    PTA Med List   Medication Sig Last Dose    ARIPiprazole (ABILIFY) 5 MG tablet Take 5 mg by mouth at bedtime 4/21/2024    doxycycline hyclate (VIBRAMYCIN) 100 MG capsule Take 100 mg by mouth 2 times daily not taking    FLUoxetine (PROZAC) 20 MG capsule Take 20 mg by mouth at bedtime 4/21/2024    hydrOXYzine HCl (ATARAX) 25 MG tablet Take 25 mg by mouth 2 times daily as needed for anxiety Unknown    lamoTRIgine (LAMICTAL) 25 MG tablet Take 25 mg by mouth 2 times daily 4/21/2024    prazosin (MINIPRESS) 2 MG capsule Take 2 mg by mouth at bedtime 4/21/2024

## 2024-04-23 NOTE — PLAN OF CARE
Pj Tucker  April 22, 2024  Plan of Care Hand-off Note     Patient Care Path: inpatient mental health    Plan for Care:   It is the recommendation of this clinician that pt admit to IP MH for safety and stabilization. Pt displays the following risk factors that support IP admission: Pt presents with suicide ideation with plan to jump off a bridge at night, with means, and intent. Pt reports he has been feeling this way for 2-3 months, but now feels impulsive and can no longer trust himself. Pt recently had a medication review, where a medication was added 1 week ago. Pt has limited social supports and has not told family about suicide ideation until today, after therapy. Pt is unable to engage in safety planning to mitigate risk level in a non-secure setting. Lower levels of care are not sufficient. Due to this IP is the least restrictive option of care for pt. Pt should remain in IP until deemed safe to return to the community and engage in OP  supports. Pt would benefit from outpatient programmatic care with DBT referral.    Identified Goals and Safety Issues: Goals: stabilize mental health, stabilize suicide ideation, medication review.    Overview:               Legal Status: Legal Status at Admission: Voluntary/Patient has signed consent for treatment    Psychiatry Consult: no       Updated   regarding plan of care.           Diane Beltre, [facility], LADC

## 2024-04-24 LAB
CHOLEST SERPL-MCNC: 198 MG/DL
HBA1C MFR BLD: 5.3 %
HDLC SERPL-MCNC: 37 MG/DL
LDLC SERPL CALC-MCNC: 131 MG/DL
NONHDLC SERPL-MCNC: 161 MG/DL
TRIGL SERPL-MCNC: 150 MG/DL
TSH SERPL DL<=0.005 MIU/L-ACNC: 0.44 UIU/ML (ref 0.3–4.2)

## 2024-04-24 PROCEDURE — 124N000002 HC R&B MH UMMC

## 2024-04-24 PROCEDURE — 84443 ASSAY THYROID STIM HORMONE: CPT

## 2024-04-24 PROCEDURE — 250N000013 HC RX MED GY IP 250 OP 250 PS 637

## 2024-04-24 PROCEDURE — 99232 SBSQ HOSP IP/OBS MODERATE 35: CPT | Performed by: STUDENT IN AN ORGANIZED HEALTH CARE EDUCATION/TRAINING PROGRAM

## 2024-04-24 PROCEDURE — 80061 LIPID PANEL: CPT

## 2024-04-24 PROCEDURE — 83036 HEMOGLOBIN GLYCOSYLATED A1C: CPT

## 2024-04-24 PROCEDURE — 93005 ELECTROCARDIOGRAM TRACING: CPT

## 2024-04-24 PROCEDURE — 93010 ELECTROCARDIOGRAM REPORT: CPT | Performed by: INTERNAL MEDICINE

## 2024-04-24 PROCEDURE — 36415 COLL VENOUS BLD VENIPUNCTURE: CPT

## 2024-04-24 RX ADMIN — PRAZOSIN HYDROCHLORIDE 2 MG: 2 CAPSULE ORAL at 21:10

## 2024-04-24 RX ADMIN — HYDROXYZINE HYDROCHLORIDE 25 MG: 25 TABLET, FILM COATED ORAL at 17:07

## 2024-04-24 RX ADMIN — ARIPIPRAZOLE 5 MG: 5 TABLET ORAL at 21:10

## 2024-04-24 RX ADMIN — LAMOTRIGINE 25 MG: 25 TABLET ORAL at 08:30

## 2024-04-24 RX ADMIN — FLUOXETINE HYDROCHLORIDE 20 MG: 20 CAPSULE ORAL at 21:10

## 2024-04-24 RX ADMIN — LAMOTRIGINE 25 MG: 25 TABLET ORAL at 19:54

## 2024-04-24 ASSESSMENT — ACTIVITIES OF DAILY LIVING (ADL)
ADLS_ACUITY_SCORE: 43
HYGIENE/GROOMING: HANDWASHING;SHOWER;INDEPENDENT
ADLS_ACUITY_SCORE: 30
ADLS_ACUITY_SCORE: 30
ADLS_ACUITY_SCORE: 43
ADLS_ACUITY_SCORE: 30
ADLS_ACUITY_SCORE: 43
LAUNDRY: UNABLE TO COMPLETE
ADLS_ACUITY_SCORE: 43
ADLS_ACUITY_SCORE: 30
ADLS_ACUITY_SCORE: 30
HYGIENE/GROOMING: HANDWASHING;SHOWER;INDEPENDENT
ADLS_ACUITY_SCORE: 30
ORAL_HYGIENE: INDEPENDENT;PROMPTS
ADLS_ACUITY_SCORE: 43
ADLS_ACUITY_SCORE: 30
ADLS_ACUITY_SCORE: 43
ADLS_ACUITY_SCORE: 30
ORAL_HYGIENE: INDEPENDENT;PROMPTS
DRESS: SCRUBS (BEHAVIORAL HEALTH);INDEPENDENT
DRESS: SCRUBS (BEHAVIORAL HEALTH);INDEPENDENT
ADLS_ACUITY_SCORE: 30
ADLS_ACUITY_SCORE: 43
LAUNDRY: UNABLE TO COMPLETE
ADLS_ACUITY_SCORE: 30
ADLS_ACUITY_SCORE: 43
ADLS_ACUITY_SCORE: 43
ADLS_ACUITY_SCORE: 30

## 2024-04-24 NOTE — PROGRESS NOTES
"  ----------------------------------------------------------------------------------------------------------  [facility]  Psychiatry Progress Note  Hospital Day #1     Interim History:     The patient's care was discussed with the treatment team and chart notes were reviewed.    Vitals: Hypertensive to 140s, otherwise VSS  Sleep: 7 hours (04/24/24 0600)  Scheduled medications: Took all scheduled medications as prescribed  Psychiatric PRN medications:   Last 24H PRN:     hydrOXYzine HCl (ATARAX) tablet 25 mg, 25 mg at 04/23/24 2130      Staff Report:   No acute events or safety concerns overnight.         30 yo male patient admitted here from [facility] ED at 1900. Patient presented to ED for evaluation of worsening suicidal ideation with plan to jump off a bridge. Patient has been dealing suicidal ideation for long time but it has become worse and he feels he needs help. He was cooperative with admission process and interview. Stated anxiety at 10, depression at 5, endorsed intrusive thoughts, intermittent suicidal thoughts with same plan. He declined any hallucinations. Pleasant, cooperative. Patient denied any pain or discomfort, appeared in no distress. Admission given unit orientation done.   Shower done, ADLs WNL, adequate food and fluid intake. Medication compliant.         Please see staff notes for details.      Subjective:     Patient Interview:  Pj MEHDI Tucker is agreeable to being interviewed by the team in the conference room today. Patient reports that he \"could be better\" and is nervous about being here. He endorses this is his first psychiatric hospitalization. Team provides validation that this a very new experience for him and that it is normal to feel nervous.     Patient endorsed having a plan for suicide and thoughts of self harm on admission, as well as thoughts of self harm today. Denies any new plan for suicide since admission. He " "describes his thoughts of self harm as \"intrusive\" and states they started a few years ago, but have been getting worse over the past few months. He does endorse periods of time over the past several years where he has not had these intrusive thoughts of self harm, but that they usually only last a few days at a time.     He endorses having brought himself to the ED as part of a safety plan. Patient encouraged for having taken this step. When asked about goals for admission, patient states he would like to \"figure out what my triggers are\". He endorses having an outpatient therapist and . Patient is supportive of the team contacting his outpatient  and psychiatrist to learn more about his history of care.     Patient states his outpatient psychiatrist recently started him on Lamictal as \"an extra mood \" as the other medications he is on \"haven't been working for me\". He does clarify that the prazosin has been helpful for his nightmares, which had been of a Druze nature that the patient associates with his Druze upbringing.     He denies thoughts of harming himself or others this morning. States he last had thoughts of self harm yesterday evening.     Patient notes that he was \"just kicked out\" of his prior living situation, but that his parents are working to find him alternative housing for when he is ready to leave the hospital. He also requests a medical note for his job, as he has been taking PTO for his time in the hospital.    Pj denies any further questions or concerns to address with the team at this time. Patient informed we will meet with him in this setting daily and encouraged him to spend the day acclimating to being on the unit.    Phone call with patient's , Elbert Heard  Was assigned to patient in October, began working with patient in late December/early January as patient was unresponsive to phone calls. Describes a pattern of patient not fully " disclosing to him how dire certain situations are, namely the amount of debt the patient is in and his new medications. The patient has reported to him instances of taking the wrong doses or his medications or taking them at the wrong times, sometimes combining his medications with alcohol. Endorses that patient struggles with understanding rules, differentiating what is safe vs unsafe. Patient is vulnerable to financial exploitation and has a history of falling for online scams, leading to the significant debt that he is in.  has set the patient up with a financial counselor and other resources for reducing debt, reports that patient has missed several of these appointments. Patient also has a history of meeting strangers online for sex, and has stated feeling unsafe sometimes with this. Elbert reports that the patient's status has worsened since starting his latest medication - unsure if this was the Abilify or Lamictal. He states this is per the patient's own report and from his own observation.    Reports that patient was kicked out of his roommate's home recently, was living with a couple in their 60s, also his emergency contacts. Elbert is unsure how he came to be living with them, describes it as having been a poor situation. Roommate's would call him complaining that the patient is messy, drinking alone in his bedroom, combining medications with alcohol, unable to care for himself. Roommate's are not allowing patient back to home, patient's parents will have to  his belongings.     is in contact with patient's therapist Mindy. Patient confided suicidal thoughts with Elbert around a month ago, Elbert encouraged him to set up a safety plan with Mindy, which he did. Patient called Elbert Monday informing him he had spoken to the crisis line over the weekend. Both actions part of his developed safety plan.    Elbert came to see the patient yesterday, felt his demeanor was similar to baseline, though  "noted some random inappropriate laughing which was out of character. Elbert will visit the patient again tomorrow afternoon. He is currently working on a [employer] waiver, COS for the patient. Does question ability of patient to live independently, though this is what the patient has expressed he wants. Has filed him for Section 8 housing.      ROS:  Negative unless stated above     Objective:     Vitals:  BP (!) 147/86   Pulse 104   Temp 98.3  F (36.8  C) (Temporal)   Resp 18   Ht 1.727 m (5' 8\")   Wt 110.6 kg (243 lb 14.4 oz)   SpO2 97%   BMI 37.08 kg/m      Allergies:  No Known Allergies    Current Medications:  Scheduled:  Current Facility-Administered Medications   Medication Dose Route Frequency Provider Last Rate Last Admin    acetaminophen (TYLENOL) tablet 325 mg  325 mg Oral Q4H PRN Tyrese Moe MD        ARIPiprazole (ABILIFY) tablet 5 mg  5 mg Oral At Bedtime Tyrese Moe MD   5 mg at 04/23/24 2130    FLUoxetine (PROzac) capsule 20 mg  20 mg Oral At Bedtime Tyrese Moe MD   20 mg at 04/23/24 2130    hydrOXYzine HCl (ATARAX) tablet 25 mg  25 mg Oral BID PRN Tyrese Moe MD   25 mg at 04/23/24 2130    lamoTRIgine (LaMICtal) tablet 25 mg  25 mg Oral BID Tyrese Moe MD   25 mg at 04/23/24 2130    OLANZapine (zyPREXA) tablet 5 mg  5 mg Oral TID PRN Tyrese Moe MD        Or    OLANZapine (zyPREXA) injection 5 mg  5 mg Intramuscular TID PRN Tyrese Moe MD        polyethylene glycol (MIRALAX) Packet 17 g  17 g Oral Daily PRN Tyrese Moe MD        prazosin (MINIPRESS) capsule 2 mg  2 mg Oral At Bedtime Tyrese Moe MD   2 mg at 04/23/24 2130       PRN:  Current Facility-Administered Medications   Medication Dose Route Frequency Provider Last Rate Last Admin    acetaminophen (TYLENOL) tablet 325 mg  325 mg Oral Q4H PRN Tyrese Moe MD        ARIPiprazole (ABILIFY) tablet 5 mg  5 mg Oral At Bedtime Tyrese Moe MD  " " 5 mg at 04/23/24 2130    FLUoxetine (PROzac) capsule 20 mg  20 mg Oral At Bedtime Tyrese Moe MD   20 mg at 04/23/24 2130    hydrOXYzine HCl (ATARAX) tablet 25 mg  25 mg Oral BID PRN Tyrese Moe MD   25 mg at 04/23/24 2130    lamoTRIgine (LaMICtal) tablet 25 mg  25 mg Oral BID Tyrese Moe MD   25 mg at 04/23/24 2130    OLANZapine (zyPREXA) tablet 5 mg  5 mg Oral TID PRN Tyrese Moe MD        Or    OLANZapine (zyPREXA) injection 5 mg  5 mg Intramuscular TID PRN Tyrese Moe MD        polyethylene glycol (MIRALAX) Packet 17 g  17 g Oral Daily PRN Tyrese Moe MD        prazosin (MINIPRESS) capsule 2 mg  2 mg Oral At Bedtime Tyrese Moe MD   2 mg at 04/23/24 2130       Labs and Imaging:  New results:   No results found for this or any previous visit (from the past 24 hour(s)).    Data this admission:  - CBC unremarkable  - CMP unremarkable  - TSH normal  - UDS negative  - Hgb A1c normal, 5.3  - Lipids remarkable for low HDL cholesterol, high LDL cholesterol  - EKG normal sinus rhythm, QTc 427     Mental Status Exam:     Oriented to:  Grossly Oriented  General:  Awake and Alert  Appearance:  appears stated age, overweight, and phenotype consistent with velocardiofacial syndrome  Behavior/Attitude:  Calm, Cooperative, and Engaged  Eye Contact: Appropriate  Psychomotor: No evidence of tics, dystonia, or tardive dyskinesia  and Restless no catatonia present  Speech:  appropriate volume/tone, paucity, hesitant, and with good articulation  Language: Fluent in English with appropriate syntax and vocabulary.  Mood:  \"good\"  Affect:  appropriate, congruent with mood, restricted, and anxious  Thought Process:  linear and coherent  Thought Content:   No SI/HI/AH/VH and does not appear to be responding to internal stimuli; No apparent delusions  Associations:  intact  Insight:  good due to understanding of condition, goals for admission  Judgment:  good due to " voluntary presentation  Impulse control: partial  Attention Span:  grossly intact and adequate for conversation  Concentration:  grossly intact  Recent and Remote Memory:  not formally assessed  Fund of Knowledge: estimated below average  Muscle Strength and Tone:  not formally assessed  Gait and Station: Normal     Psychiatric Assessment     Pj Tucker is a 29 year old male previously diagnosed with MDD, ADHD, autism spectrum disorder who presented voluntarily by himself with suicidal  in the context of increased psychosocial stressors, medication non-adherence. No prior psychiatric hospitalizations. Significant symptoms on admission include suicidal ideation with plan, increased impulsivity. The MSE on admission was pertinent for dysphoric but reactive affect, linear and goal directed in thought. Biological contributions to mental health presentation include diagnoses of autism spectrum disorder, medication non-adherence. Psychological contributions to mental health presentation include diagnoses of ADHD, MDD as well as insight, coping, personality.  Social factors contributing to mental health presentation include financial stressors, communication challenges. Protective factors include willingness to engage in treatment, lack of prior suicide attempts/SIB.      In summary, the patient's reported symptoms of suicidal ideation in the context of recent psychosocial stressors are consistent with depressive disorder, most likely major depressive episode. Differentials include unspecified depressive disorder, adjustment disorder, intrusive thoughts 2/2 OCD.  He will likely benefit from medication management, stabilization and observation this admission.     Given that he currently has SI, patient warrants inpatient psychiatric hospitalization to maintain his safety.      Psychiatric Plan by Diagnosis      Today's changes:  - No medication changes today  - Will obtain collateral from outpatient providers     #  Suicidal ideation  # MDD  1. Medications:  scheduled  - Lamictal 25 mg BID  - Prozac 20 mg daily  - Prazosin 2 mg at bedtime    prn  - Hydroxyzine 25 mg TID PRN for anxiety     2. Pertinent Labs/Monitoring:   - EKG and labs ordered for 4/23     3. Additional Plans:  - Patient will be treated in therapeutic milieu with appropriate individual and group therapies as described     # ASD  # ADHD  - Abilify 5 mg daily     Psychiatric Hospital Course:      Pj Tucker was admitted to [facility] as a voluntary patient.   Medications:  PTA Lamictal, Abilify, Prozac, prazosin, hydroxyzine were continued.   No PTA medications were held.   No new medications started at the time of admission.      The risks, benefits, alternatives, and side effects were discussed and understood by the patient.     Medical Assessment and Plan     Medical diagnoses to be addressed this admission:  N/A    Medical course: Patient was physically examined by the ED prior to being transferred to the unit and was found to be medically stable and appropriate for admission.     Consults: none     Checklist     Legal Status: Voluntary     Safety Assessment:   Behavioral Orders   Procedures    Code 1 - Restrict to Unit    Routine Programming     As clinically indicated    Self Injury Precaution    Status 15     Every 15 minutes.    Suicide precautions: Suicide Risk: MODERATE; Clinical rationale to override score: modification to the care environment, lack of access to a plan for self-harm     Patients on Suicide Precautions should have a Combination Diet ordered that includes a Diet selection(s) AND a Behavioral Tray selection for Safe Tray - with utensils, or Safe Tray - NO utensils       Order Specific Question:   Suicide Risk     Answer:   MODERATE     Order Specific Question:   Clinical rationale to override score:     Answer:   modification to the care environment     Order Specific Question:   Clinical rationale to override score:     Answer:    lack of access to a plan for self-harm       Risk Assessment:  Risk for harm is elevated.  Risk factors: SI, impulsive, and psychosocial stressors  Protective factors: engaged in treatment     SIO: none    Disposition: TBD. Pending stabilization, medication optimization, & development of a safe discharge plan.     Attestations     Med Student: Marisol Melton, MS4  [facility] Medical Student       I saw and evaluated the patient.  I agree with the history, findings, assessment and plan documented by Marisol Melton.

## 2024-04-24 NOTE — DISCHARGE INSTRUCTIONS
Behavioral Discharge Planning and Instructions    Summary:   You were admitted to [facility] on  24 with worsening depressive symptoms and suicidal ideation. You met with the Psychiatry team daily for ongoing psychiatric assessment and medication management.  You had opportunities to participate in therapeutic groups on the unit.   At this time you report your mood is stabilizing and you report you are not having thoughts or intent to harm yourself or others. You will be discharged home and will resume care with your outpatient providers.    Disposition:  Gricelda GUAJARDO    Main Diagnosis:   Major Depressive Disorder  ADHD  ASD    Health Care Follow-up:   Psychiatrist: Jazmin Sloan M.D :24 at 11:40am  [facility] -[address] [place] [address] , [place] [address] [phone]        Appointment: Therapist: Naomi Schoenberg: 24 at 10:00am  [facility] - [facility] , [place] [address] [phone]    Primary Physician: [facility] Marie/Rsvl - make appointment as needed.     [facility] :  [facility] ([facility]) Elbert Heard: [phone]   Care coordinator Heather @ [facility] [phone] x [phone]    Major Treatments, Procedures and Findings:   Medications were  managed throughout your stay. An internal medicine consult was completed during your stay. You had the opportunity to participate in treatment programming while on the unit including occupational therapy, mental health support and education and spiritual services.     Symptoms to Report:   Please report if you are experiencing increased aggression and/or confusion, problematic loss of sleep, worsening mood, or thoughts of suicide to your treatment team or notify your primary provider.   IF THE SYMPTOMS YOU ARE EXPERIENCING ARE A MEDICAL EMERGENCY, CALL 911 IMMEDIATELY    Lifestyle Adjustment:   1. Adjust your lifestyle to get enough sleep, relaxation, exercise and good nutrition.  Continue  "to develop healthy coping skills to decrease stress and promote a healthy and sober lifestyle.  2. Abstain from all substances of abuse.  3. Take medications as prescribed.  Please work with your doctor to discuss any concerns you have with your medications or side effects you may be experiencing.  4. Follow up with appointments as scheduled.      Resources:   Suicide and Crisis Response  The [facility] Crisis Response Unit (CRU) provides 24-hour phone and face-to-face crisis intervention and consultation. Call [phone].     The primary goal of the Crisis Response Unit is to assist in stabilizing the immediate crisis, ensure safety for the client, the family and/or the community, and assist with referrals to appropriate county or other agency staff as necessary.  Other crisis resources:     [place] [employer] Hotline for  Suicide: 1-411-LHYBSGS (1-952.940.6029)  [place] Crisis Text Line: Text \"Home\" to 722770 to communicate with a trained crisis counselor      General Medication Instructions:   See your medication sheet(s) for instructions.   Take all medicines as directed.  Make no changes unless your doctor suggests them.   Go to all your doctor visits.  Be sure to have all your required lab tests. This way, your medicines can be refilled on time.  Do not use any drugs not prescribed by your doctor.    Advance Directives:   Scanned document on file with [employer]? No scanned doc  Is document scanned? Pt states no documents  Honoring Choices Your Rights Handout: Informed and given  Was more information offered? Materials given    The Treatment team has appreciated the opportunity to work with you. If you have any questions or concerns about your recent admission, you can contact the unit which can receive your call 24 hours a day, 7 days a week. They will be able to get in touch with a Provider if needed. The unit number is [phone]  "

## 2024-04-24 NOTE — H&P
"INITIAL PSYCHOSOCIAL ASSESSMENT   I have reviewed the chart met with the patient, and developed Care Plan.  Information for assessment was obtained from:     Presenting Problem:   The patient is a 29 year old male with previous psychiatric diagnoses of MDD, ADHD, autism spectrum disorder admitted from the ER on 04/23/2024 due to concern for SI in the context of psychosocial stressors including financial issues, feeling he is \"a burden to everyone in my life\" .   He states he has had suicidal thoughts for awhile, but they have been getting worse lately. Per chart review, has a plan to jump off of a bridge and states he is very close to attempting, though he has not attempted to do so yet. Unable to contract for safety in the ER.   The following areas have been assessed:  History of Mental Health and Chemical Dependency:  This is patient's first psychiatric admission- He has been treated as an outpatient.  Patient denies any h/o suicide attempts, self injury    Patient denies any current or past abuse of alcohol/drugs    Family Description (Constellation, Family Psychiatric History):   Patient was born in [place]- raised in [place].  Patient was adopted at age 3, has three siblings.     Patient is single- never , no children    Biological family history is unknown      Significant Life Events (Illness, Abuse, Trauma, Death):  Patient denies any h/o physical/sexual abuse    Living Situation:     Patient has been renting a room from a couples in their 60s and apparently can not return    Educational Background:   High  School diploma - special education    Occupational History:   Patient is employed full-time in a [employer], working since September 2023     Financial Status:    Patient reports financial stressors - unable to elaborate    Legal Issues:   None     Ethnic/Cultural Issues:  No concerns identified    Spiritual Orientation:    Quaker                       Service History:   N/A    Social " Functioning (organization, interests):  Patient enjoys outdoor activities, exercise/fitness, spectator events, sports/team sports                                                      Current Treatment Providers are:  Psychiatrist: Jazmin JASON  -[facility] -[facility] , [place] [address] [phone]    Primary Physician: [facility] Marie/Rsvl    Therapist: Mindy  @ [facility] - [facility] , [place] [address] [phone]    [facility] : Elbert Heard: [phone]  [facility]    Social Service Assessment/Plan:  Patient will have ongoing psychiatric assessment.  Medications will be reviewed and adjusted per MD as indicated.  Outpatient providers will be contacted for care coordination.  Hospital staff will provide a safe environment and a therapeutic milieu. Patient will be encouraged to participate in unit groups and activities.   CTC will continue to assess needs and  ensure appropriate follow up care is in place.

## 2024-04-24 NOTE — PLAN OF CARE
No PRNs given or requested this shift. Safety checks completed every 15 minutes; no concerns noted. Pt remained in his room the entire shift; appears to have slept for  7 hours. Will continue to monitor and offer support.     Problem: Sleep Disturbance  Goal: Adequate Sleep/Rest  Outcome: Progressing   Goal Outcome Evaluation:

## 2024-04-24 NOTE — PLAN OF CARE
Problem: Adult Behavioral Health Plan of Care  Goal: Absence of New-Onset Illness or Injury  Outcome: Progressing  Goal: Optimized Coping Skills in Response to Life Stressors  Outcome: Progressing   Goal Outcome Evaluation:
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
## 2024-04-24 NOTE — PLAN OF CARE
04/23/24 2150   Patient Belongings   Did you bring any home meds/supplements to the hospital?  No   Patient Belongings locker;sent to security per site process   Patient Belongings Put in Hospital Secure Location (Security or Locker, etc.) cash/credit card;cell phone/electronics;clothing;keys;wallet;shoes   Belongings Search Yes   Clothing Search Yes   Second Staff Arik Choi     Patient Belongings:    7 pairs of socks, 4 pairs of shorts, 2 sweaters, 3 boxers, 6 shirts, 1 shoe, 1 phone, 1 keys, 1 anker , 1 headphone, 1 phone , 1 wallet    Sent to Security:    1 Visa [id number]  1 Debit [phone]  1 [place] [phone]  1 Debit [phone]  1 Visa [phone]  1 Visa [phone]  1 Social Security [phone]     Added on 4/27/2024:    1 car keys returned to father     A               Admission:  I am responsible for any personal items that are not sent to the safe or pharmacy.  [facility] is not responsible for loss, theft or damage of any property in my possession.    Signature:  _________________________________ Date: _______  Time: _____                                              Staff Signature:  ____________________________ Date: ________  Time: _____      2nd Staff person, if patient is unable/unwilling to sign:    Signature: ________________________________ Date: ________  Time: _____     Discharge:  [facility] has returned all of my personal belongings:    Signature: _________________________________ Date: ________  Time: _____                                          Staff Signature:  ____________________________ Date: ________  Time: _____

## 2024-04-24 NOTE — PHARMACY-ADMISSION MEDICATION HISTORY
Medication history completed on 4/22/2024 at [facility]. Please refer to this note for prior to admission medication information.    JANIE HERRING, [facility], located within [facility]  [phone] and available on [employer]

## 2024-04-24 NOTE — PROVIDER NOTIFICATION
04/24/24 0945   Individualization/Patient Specific Goals   Patient Personal Strengths community support;expressive of emotions;family/social support;independent living skills;interests/hobbies;medication/treatment adherence;motivated for treatment;positive vocational history;resilient   Patient Vulnerabilities adverse childhood experience(s);limited social skills   Anxieties, Fears or Concerns First psychiatric admission- nervous   Individualized Care Needs None   Interprofessional Rounds   Summary 1. Evaluation/stabilization of mood disorder sx's  2. Safe with self- absence of SI  3. Medication mgmt per MD's  4. Coordination of care with outpatient provers  5. Housing in place 6. Psychiatric f/u care in place   Participants CTC;nursing;patient;psychiatrist  (med students)   Behavioral Team Discussion   Participants Dr. Jay, Amber Zheng RN, Jazmin Khoury MA.LP   Progress Initial assessment   Anticipated length of stay 5-7 days   Continued Stay Criteria/Rationale Worsening depression/SI   Medical/Physical Stable   Precautions Per unit protocol   Plan Patient will be seen by Psychiatry daily.  Meds will be reviewed/adjusted per MD's as indicated.  Outpatient providers/family will be contacted for care coordination.  Patient will need housing.  CTC will continue to assess needs, ensure appropriate follow up care is in place   Rationale for change in precautions or plan No change in plan/precautions   Safety Plan Patient to complete   Anticipated Discharge Disposition home with family     Goal Outcome Evaluation:

## 2024-04-24 NOTE — PLAN OF CARE
"Goal Outcome Evaluation:    Plan of Care Reviewed With: patient Plan of Care Reviewed With: patient    Overall Patient Progress: improvingOverall Patient Progress: improving       Problem: Adult Behavioral Health Plan of Care  Goal: Plan of Care Review  Outcome: Progressing  Flowsheets  Taken 4/24/2024 1215  Plan of Care Reviewed With: patient  Overall Patient Progress: improving  Patient Agreement with Plan of Care: agrees  Taken 4/24/2024 1100  Patient Agreement with Plan of Care: agrees     Behavioral  Pt slept 7 hours overnight; eating and hydrating adequately. Compliant with medications. Attending to ADL's independently; no behavioral escalation or safety concerns noted this shift. pleasant and cooperative upon approach; pt has delay in speech but appropriate and able to make needs known; Pt minimally interacting and social with peers; attended OT groups; endorsed SI thoughts but \"not at this moment.\" Pt denied SIB, HI, and hallucinations; affect is flat and blunted; mood calm but stated, \"I am bored\" Endorsed depression and anxiety 7-8.    Medical    No complaints of physical pain/discomfort this shift. Vital signs stable.    PRNS:  - None    Plan  "

## 2024-04-24 NOTE — PLAN OF CARE
BEH IP Unit Acuity Rating Score (UARS)  Patient is given one point for every criteria they meet.    CRITERIA SCORING   On a 72 hour hold, court hold, committed, stay of commitment, or revocation. 0    Patient LOS on BEH unit exceeds 20 days. 0  LOS: 1   Patient under guardianship, 55+, otherwise medically complex, or under age 11. 0   Suicide ideation without relief of precipitating factors. 1   Current plan for suicide. 1   Current plan for homicide. 0   Imminent risk or actual attempt to seriously harm another without relief of factors precipitating the attempt. 1   Severe dysfunction in daily living (ex: complete neglect for self care, extreme disruption in vegetative function, extreme deterioration in social interactions). 1   Recent (last 7 days) or current physical aggression in the ED or on unit. 0   Restraints or seclusion episode in past 72 hours. 0   Recent (last 7 days) or current verbal aggression, agitation, yelling, etc., while in the ED or unit. 0   Active psychosis. 0   Need for constant or near constant redirection (from leaving, from others, etc).  0   Intrusive or disruptive behaviors. 0   Patient requires 3 or more hours of individualized nursing care per 8-hour shift (i.e. for ADLs, meds, therapeutic interventions). 0   TOTAL 4

## 2024-04-24 NOTE — PROGRESS NOTES
28 yo male patient admitted here from [facility] ED at 1900. Patient presented to ED for evaluation of worsening suicidal ideation with plan to jump off a bridge. Patient has been dealing suicidal ideation for long time but it has become worse and he feels he needs help. He was cooperative with admission process and interview. Stated anxiety at 10, depression at 5, endorsed intrusive thoughts, intermittent suicidal thoughts with same plan. He declined any hallucinations. Pleasant, cooperative. Patient denied any pain or discomfort, appeared in no distress. Admission given unit orientation done.   Shower done, ADLs WNL, adequate food and fluid intake. Medication compliant.

## 2024-04-25 LAB
ATRIAL RATE - MUSE: 95 BPM
DIASTOLIC BLOOD PRESSURE - MUSE: NORMAL MMHG
INTERPRETATION ECG - MUSE: NORMAL
P AXIS - MUSE: 54 DEGREES
PR INTERVAL - MUSE: 134 MS
QRS DURATION - MUSE: 90 MS
QT - MUSE: 340 MS
QTC - MUSE: 427 MS
R AXIS - MUSE: 37 DEGREES
SYSTOLIC BLOOD PRESSURE - MUSE: NORMAL MMHG
T AXIS - MUSE: 28 DEGREES
VENTRICULAR RATE- MUSE: 95 BPM

## 2024-04-25 PROCEDURE — 90832 PSYTX W PT 30 MINUTES: CPT

## 2024-04-25 PROCEDURE — 99232 SBSQ HOSP IP/OBS MODERATE 35: CPT | Performed by: STUDENT IN AN ORGANIZED HEALTH CARE EDUCATION/TRAINING PROGRAM

## 2024-04-25 PROCEDURE — 124N000002 HC R&B MH UMMC

## 2024-04-25 PROCEDURE — 250N000009 HC RX 250: Performed by: PHYSICIAN ASSISTANT

## 2024-04-25 PROCEDURE — 99221 1ST HOSP IP/OBS SF/LOW 40: CPT | Performed by: PHYSICIAN ASSISTANT

## 2024-04-25 PROCEDURE — 250N000013 HC RX MED GY IP 250 OP 250 PS 637

## 2024-04-25 RX ORDER — ERYTHROMYCIN 5 MG/G
OINTMENT OPHTHALMIC 4 TIMES DAILY
Status: COMPLETED | OUTPATIENT
Start: 2024-04-25 | End: 2024-05-02

## 2024-04-25 RX ORDER — ERYTHROMYCIN 5 MG/G
OINTMENT OPHTHALMIC EVERY 6 HOURS SCHEDULED
Status: DISCONTINUED | OUTPATIENT
Start: 2024-04-25 | End: 2024-04-25

## 2024-04-25 RX ADMIN — ARIPIPRAZOLE 5 MG: 5 TABLET ORAL at 20:55

## 2024-04-25 RX ADMIN — ERYTHROMYCIN 1 G: 5 OINTMENT OPHTHALMIC at 16:25

## 2024-04-25 RX ADMIN — FLUOXETINE HYDROCHLORIDE 20 MG: 20 CAPSULE ORAL at 20:54

## 2024-04-25 RX ADMIN — HYDROXYZINE HYDROCHLORIDE 25 MG: 25 TABLET, FILM COATED ORAL at 16:25

## 2024-04-25 RX ADMIN — PRAZOSIN HYDROCHLORIDE 2 MG: 2 CAPSULE ORAL at 20:54

## 2024-04-25 RX ADMIN — ERYTHROMYCIN 1 G: 5 OINTMENT OPHTHALMIC at 20:55

## 2024-04-25 RX ADMIN — LAMOTRIGINE 25 MG: 25 TABLET ORAL at 08:32

## 2024-04-25 ASSESSMENT — ACTIVITIES OF DAILY LIVING (ADL)
ADLS_ACUITY_SCORE: 43
DRESS: INDEPENDENT;SCRUBS (BEHAVIORAL HEALTH)
ADLS_ACUITY_SCORE: 43
HYGIENE/GROOMING: HANDWASHING;SHOWER;INDEPENDENT
ADLS_ACUITY_SCORE: 43
ADLS_ACUITY_SCORE: 43
DRESS: INDEPENDENT
ORAL_HYGIENE: INDEPENDENT;PROMPTS
LAUNDRY: UNABLE TO COMPLETE
ADLS_ACUITY_SCORE: 43
ORAL_HYGIENE: INDEPENDENT;PROMPTS
ADLS_ACUITY_SCORE: 43
HYGIENE/GROOMING: INDEPENDENT
ADLS_ACUITY_SCORE: 43
LAUNDRY: UNABLE TO COMPLETE
ADLS_ACUITY_SCORE: 43
ADLS_ACUITY_SCORE: 43

## 2024-04-25 NOTE — PLAN OF CARE
BEH IP Unit Acuity Rating Score (UARS)  Patient is given one point for every criteria they meet.     CRITERIA SCORING   On a 72 hour hold, court hold, committed, stay of commitment, or revocation. 0    Patient LOS on BEH unit exceeds 20 days. 0  LOS: 2   Patient under guardianship, 55+, otherwise medically complex, or under age 11. 0   Suicide ideation without relief of precipitating factors. 1   Current plan for suicide. 1   Current plan for homicide. 0   Imminent risk or actual attempt to seriously harm another without relief of factors precipitating the attempt. 1   Severe dysfunction in daily living (ex: complete neglect for self care, extreme disruption in vegetative function, extreme deterioration in social interactions). 1   Recent (last 7 days) or current physical aggression in the ED or on unit. 0   Restraints or seclusion episode in past 72 hours. 0   Recent (last 7 days) or current verbal aggression, agitation, yelling, etc., while in the ED or unit. 0   Active psychosis. 0   Need for constant or near constant redirection (from leaving, from others, etc).  0   Intrusive or disruptive behaviors. 0   Patient requires 3 or more hours of individualized nursing care per 8-hour shift (i.e. for ADLs, meds, therapeutic interventions). 0   TOTAL 4

## 2024-04-25 NOTE — PROGRESS NOTES
"  ----------------------------------------------------------------------------------------------------------  [facility]  Psychiatry Progress Note  Hospital Day #2     Interim History:     The patient's care was discussed with the treatment team and chart notes were reviewed.    Vitals: Hypertensive to 140s, otherwise VSS  Sleep: 7 hours (04/25/24 0600)  Scheduled medications: Took all scheduled medications as prescribed  Psychiatric PRN medications:   Last 24H PRN:      hydrOXYzine HCl (ATARAX) tablet 25 mg, 25 mg at 04/24/24 1707      Staff Report:     \"Pt was more visible in the lounge majority of this evening, was sitting watching TV but no interactions with peers or staff. Eating and drinking adequately. Hygiene appears appropriate to situation. Continue endorsing depression and anxiety. PRN Hydroxyzine was given. Pt denies SI/HI/SIB, hallucinations. Pt on SIB/SI precautions. Safety was maintained throughout the shift.Will continue to monitor and offer supports\".    Please see staff results for further details.        Subjective:     Patient Interview:  Pj Tucker was seen sitting quietly in the milieu, watching TV. He is agreeable to being interviewed in the conference room today. He states his day yesterday was fine, did not meet any other peers or staff on the unit. This morning he is feeling \"not any different\". He denies any issues with sleep last night.    The team informs the patient that Elbert, his , will be visiting the unit regarding the living situation that mom set up. Patient reports he doesn't know what kind or living situation his mom has set him up with.     Patient reports he's had discharge, puffiness, itchiness of both of his eyes over the past 1-2 years. Denies seasonal allergies. Previously went to a doctor who said it was allergic conjunctivitis and was given eye drops, but didn't feel they helped and is not currently using. " "Agreeable to a medicine consult to talk about the issues he's having with his eyes.    Endorses feleing more down since starting the Lamictal - his states he and his psychiatrist started this as an \"extra mood stabilizer\". He would like to stop the Lamictal and is informed that the team agrees with this change. We will focus on optimizing his other medications.     He reports that his depression is a 7/10, and his anxiety is \"about the same\", around a 7/10. He denies current SI, but has had some since he last talked with the team. Endorses that these thoughts were the same plan for ending his life that he had when he came into the hospital.    Informed that the team will be contacting his outpatient psychiatrist and he is agreeable to signing an DOMINGO.     Pj denies any additional questions or concerns to address with the team at this time.    Phone call with therapist, Mindy, 4/25  Mindy reports that she began seeing Pj August 2023, albeit with a lapse of time since due to financial issues. She reports his intrusive thoughts have been consistent, with thoughts of suicide but with no prior action or plan. Did endorse thoughts generally about jumping off of a bridge. She developed a safety plan with him, which he did follow over the weekend leading to his admission. She met with him on Monday. Endorses a lot of stressors for him right now that may be contributing to his presentation/worsening SI. They had recently been working on relationships and how to connect with others. She believes he would benefit from increased structure in his days and more supports around him. Hoping MA goes through so that they can meet twice weekly.     ROS:  Negative unless stated above     Objective:     Vitals:  /87   Pulse 79   Temp 97.1  F (36.2  C) (Temporal)   Resp 18   Ht 1.727 m (5' 8\")   Wt 111.7 kg (246 lb 4.8 oz)   SpO2 97%   BMI 37.45 kg/m      Allergies:  No Known Allergies    Current " Medications:  Scheduled:  Current Facility-Administered Medications   Medication Dose Route Frequency Provider Last Rate Last Admin     acetaminophen (TYLENOL) tablet 325 mg  325 mg Oral Q4H PRN Tyrese Moe MD         ARIPiprazole (ABILIFY) tablet 5 mg  5 mg Oral At Bedtime Tyrese Moe MD   5 mg at 04/24/24 2110     FLUoxetine (PROzac) capsule 20 mg  20 mg Oral At Bedtime Tyrese Moe MD   20 mg at 04/24/24 2110     hydrOXYzine HCl (ATARAX) tablet 25 mg  25 mg Oral BID PRN Tyrese Moe MD   25 mg at 04/24/24 1707     OLANZapine (zyPREXA) tablet 5 mg  5 mg Oral TID PRN Tyrese Moe MD        Or     OLANZapine (zyPREXA) injection 5 mg  5 mg Intramuscular TID PRN Tyrese Moe MD         polyethylene glycol (MIRALAX) Packet 17 g  17 g Oral Daily PRN Tyrese Moe MD         prazosin (MINIPRESS) capsule 2 mg  2 mg Oral At Bedtime Tyrese Moe MD   2 mg at 04/24/24 2110       PRN:  Current Facility-Administered Medications   Medication Dose Route Frequency Provider Last Rate Last Admin     acetaminophen (TYLENOL) tablet 325 mg  325 mg Oral Q4H PRN Tyrese Moe MD         ARIPiprazole (ABILIFY) tablet 5 mg  5 mg Oral At Bedtime Tyrese Moe MD   5 mg at 04/24/24 2110     FLUoxetine (PROzac) capsule 20 mg  20 mg Oral At Bedtime Tyrese Moe MD   20 mg at 04/24/24 2110     hydrOXYzine HCl (ATARAX) tablet 25 mg  25 mg Oral BID PRN Tyrese Moe MD   25 mg at 04/24/24 1707     OLANZapine (zyPREXA) tablet 5 mg  5 mg Oral TID PRN Tyrese Moe MD        Or     OLANZapine (zyPREXA) injection 5 mg  5 mg Intramuscular TID PRN Tyrese Moe MD         polyethylene glycol (MIRALAX) Packet 17 g  17 g Oral Daily PRN Tyrese Moe MD         prazosin (MINIPRESS) capsule 2 mg  2 mg Oral At Bedtime Tyrese Moe MD   2 mg at 04/24/24 2110       Labs and Imaging:  New results:   No results found for  "this or any previous visit (from the past 24 hour(s)).      Data this admission:  - CBC unremarkable  - CMP unremarkable  - TSH normal  - UDS negative  - Hgb A1c normal, 5.3  - Lipids remarkable for low HDL cholesterol, high LDL cholesterol  - EKG normal sinus rhythm, QTc 427     Mental Status Exam:     Oriented to:  Grossly Oriented  General:  Awake and Alert  Appearance:  appears stated age, overweight, and phenotype consistent with velocardiofacial syndrome  Behavior/Attitude:  Calm, Cooperative, and Engaged  Eye Contact: Appropriate  Psychomotor: Normal and No evidence of tics, dystonia, or tardive dyskinesia  no catatonia present  Speech:  appropriate volume/tone, paucity, and hesitant  Language: Fluent in English with appropriate syntax and vocabulary.  Mood:  \"okay\"  Affect:  appropriate, congruent with mood, restricted, and anxious  Thought Process:  linear and coherent  Thought Content:   No SI/HI/AH/VH and does not appear to be responding to internal stimuli; No apparent delusions  Associations:  intact  Insight:  fair due to understanding of condition, goals for admission  Judgment:  fair due to voluntary presentation  Impulse control: partial  Attention Span:  grossly intact and adequate for conversation  Concentration:  grossly intact  Recent and Remote Memory:  not formally assessed  Fund of Knowledge: estimated below average  Muscle Strength and Tone:  not formally assessed  Gait and Station: Normal     Psychiatric Assessment     Pj Tucker is a 29 year old male previously diagnosed with MDD, ADHD, autism spectrum disorder who presented voluntarily by himself with suicidal  in the context of increased psychosocial stressors, medication non-adherence. No prior psychiatric hospitalizations. Significant symptoms on admission include suicidal ideation with plan, increased impulsivity. The MSE on admission was pertinent for dysphoric but reactive affect, linear and goal directed in thought. Biological " contributions to mental health presentation include diagnoses of autism spectrum disorder, medication non-adherence. Psychological contributions to mental health presentation include diagnoses of ADHD, MDD as well as insight, coping, personality.  Social factors contributing to mental health presentation include financial stressors, communication challenges. Protective factors include willingness to engage in treatment, lack of prior suicide attempts/SIB.      In summary, the patient's reported symptoms of suicidal ideation in the context of recent psychosocial stressors are consistent with depressive disorder, most likely major depressive episode. Differentials include unspecified depressive disorder, adjustment disorder, intrusive thoughts 2/2 OCD.  He will likely benefit from medication management, stabilization and observation this admission.     Given that he currently has SI, patient warrants inpatient psychiatric hospitalization to maintain his safety.      Psychiatric Plan by Diagnosis      Today's changes:  - Discontinuing PTA Lamictal  - Medicine consult for possible allergic conjunctivitis  - Will contact outpatient psychiatrist     # Suicidal Ideation  #MDD  1. Medications:  - Prozac 20 mg daily  - Prazosin 2 mg at bedtime    Prn  - Hydroxyzine 25 mg TID PRN for anxiety     2. Pertinent Labs/Monitoring:   - Qtc 427 ms     3. Additional Plans:  - Patient will be treated in therapeutic milieu with appropriate individual and group therapies as described    # ASD  # ADHD  - Abilify 5 mg daily     Psychiatric Hospital Course:      Pj Tucker was admitted to [facility] as a voluntary patient.  ? Medications:  o PTA Abilify, Prozac, prazosin, and hydroxyzine were continued.   o PTA Lamictal was discontinued 4/25 due to status decline since initiation 1-2 weeks ago.  o No new medications started at the time of admission.     The risks, benefits, alternatives, and side effects were discussed and understood  by the patient.     Medical Assessment and Plan     Medical diagnoses to be addressed this admission:  N/A    Medical course: Patient was physically examined by the ED prior to being transferred to the unit and was found to be medically stable and appropriate for admission.     Consults: medicine     Checklist     Legal Status: Voluntary     Safety Assessment:   Behavioral Orders   Procedures     Code 1 - Restrict to Unit     Routine Programming     As clinically indicated     Self Injury Precaution     Status 15     Every 15 minutes.     Suicide precautions: Suicide Risk: MODERATE; Clinical rationale to override score: modification to the care environment, lack of access to a plan for self-harm     Patients on Suicide Precautions should have a Combination Diet ordered that includes a Diet selection(s) AND a Behavioral Tray selection for Safe Tray - with utensils, or Safe Tray - NO utensils       Order Specific Question:   Suicide Risk     Answer:   MODERATE     Order Specific Question:   Clinical rationale to override score:     Answer:   modification to the care environment     Order Specific Question:   Clinical rationale to override score:     Answer:   lack of access to a plan for self-harm       Risk Assessment:  Risk for harm is elevated.  Risk factors: SI, impulsive, and psychosocial stressors  Protective factors: engaged in treatment     SIO: none    Disposition: Pending stabilization, medication optimization, & development of a safe discharge plan.     Attestations     Med Student: Marisol Melton, MS4  [facility] Medical Student     This patient has been seen and evaluated by me, Maria Esther Swanson DO.  I have discussed this patient with the team including the resident and medical student(s) and I agree with the findings and plan in this note.  Dr. Maria Esther Swanson DO, DENISE

## 2024-04-25 NOTE — PLAN OF CARE
"Goal Outcome Evaluation:    Plan of Care Reviewed With: patient Plan of Care Reviewed With: patient    Overall Patient Progress: improvingOverall Patient Progress: improving       Problem: Adult Behavioral Health Plan of Care  Goal: Plan of Care Review  Outcome: Progressing  Flowsheets  Taken 4/25/2024 1216  Plan of Care Reviewed With: patient  Overall Patient Progress: improving  Patient Agreement with Plan of Care: agrees  Taken 4/25/2024 1200  Patient Agreement with Plan of Care: agrees  Pt was up and visible in the lounge but remain isolative and withdrawn to self. Did not want to attend groups but was observed playing cards by himself and watching TV but no interactions with peers or staff. Eating and drinking adequately. Hygiene is adequate, pt was encouraged to shower and he agreed and took a shower. Continue endorsing depression and anxiety both 6/10 and SI without plans. Pt denied HI/SIB, hallucinations. Pt on SIB/SI precautions. Safety was maintained throughout the shift. Will continue to monitor and offer supports. Pt has medicine consult for swelling eyes and discharge due to the previous allergic conjunctivitis. When medicine came to see pt, he was with his . Pt is allowed to access his phone with supervision to check his housing status. Lamictal was discontinued. Blood pressure 122/87, pulse 79, temperature 97.1  F (36.2  C), temperature source Temporal, resp. rate 18, height 1.727 m (5' 8\"), weight 111.7 kg (246 lb 4.8 oz), SpO2 97%.          "

## 2024-04-25 NOTE — PLAN OF CARE
Problem: Sleep Disturbance  Goal: Adequate Sleep/Rest  Outcome: Progressing   Goal Outcome Evaluation:    Patient appears to have slept a total of 7 hours. Safety/environment checks conducted every 15 minutes with no concerns noted. No complaints of pain/discomfort.

## 2024-04-25 NOTE — CONSULTS
[facility]  Consult Note - Hospitalist Service  Date of Admission:  4/23/2024  Consult Requested by: Maria Esther Swanson MD  Reason for Consult: eye swelling and discharge, previously told he has allergic conjunctivitis but not on any medications    Assessment & Plan   Pj Tucker is a 29 year old male admitted on 4/23/2024 for SI. He has a history of MDD, ADHD, autism spectrum disorder, obesity.     # Bilateral purulent eye discharge  Patient states this issue has been going on for at least a year. He has seen some eye specialist OP who gave him drops for allergic conjunctivitis, which the patient states didn't help. He states he has thick discharge to bilateral eyes R>L that is present throughout much of the day. He has intermittent itchiness, no pain. Some mild blurring of vision to R eye intermittently. On exam, green-white thick dried discharge present to R eye. Mild conjunctival irritation to bilateral eyes.   - erythromycin 0.5% ophthalmic to bilateral eyes 4 times daily x 7 days.   - please contact medicine if symptoms do not improve with the above treatment.       Thank you for involving us in the care of this patient.  Please contact medicine if symptoms do not improve with the above treatment.  Medicine service will sign off at this time.  Please contact reconsult us with any new medical questions or concerns.  Recommendations relayed to primary team via this note, plan discussed with patient verbally.       Ursula Strange PA-C  Hospitalist Service  Securely message with [employer] (more info)  Text page via [facility] Paging/Directory   ______________________________________________________________________    Chief Complaint   Eye discharge    History is obtained from the patient    History of Present Illness   Pj Tucker is a 29 year old male who is seen for a medical evaluation. Patient states this issue has been going on for at least a year. He has seen some eye  specialist OP who gave him drops for allergic conjunctivitis, which the patient states didn't help. He states he has thick discharge to bilateral eyes R>L that is present throughout much of the day. He has intermittent itchiness, no pain. Some mild blurring of vision to R eye intermittently.       Past Medical History    See above.     Past Surgical History   No past surgical history on file.    Medications   Medications Prior to Admission   Medication Sig Dispense Refill Last Dose    ARIPiprazole (ABILIFY) 5 MG tablet Take 5 mg by mouth at bedtime       doxycycline hyclate (VIBRAMYCIN) 100 MG capsule Take 100 mg by mouth 2 times daily       FLUoxetine (PROZAC) 20 MG capsule Take 20 mg by mouth at bedtime       hydrOXYzine HCl (ATARAX) 25 MG tablet Take 25 mg by mouth 2 times daily as needed for anxiety       lamoTRIgine (LAMICTAL) 25 MG tablet Take 25 mg by mouth 2 times daily       prazosin (MINIPRESS) 2 MG capsule Take 2 mg by mouth at bedtime             Review of Systems    Review of systems was not needed on this patient     Physical Exam   Vital Signs: Temp: 97.1  F (36.2  C) Temp src: Temporal BP: 122/87 Pulse: 79   Resp: 18 SpO2: 97 % O2 Device: None (Room air)    Weight: 246 lbs 4.8 oz    Focused physical exam:   Eyes: green-white thick dried discharge present to R eye. Mild conjunctival irritation to bilateral eyes.     Medical Decision Making       35 MINUTES SPENT BY ME on the date of service doing chart review, history, exam, documentation & further activities per the note.      Data

## 2024-04-25 NOTE — PLAN OF CARE
Assessment/Intervention/Current Symtoms and Care Coordination:  Chart reviewed and patient met with team,   Discussed patient progress, symptomology, and response to treatment.  Discussed the discharge plan and any potential impediments to discharge.    Patient met with team. No significant change in mood as yet. Continues to endorse SI- feeling very lonely. Affect remains flat.  Patient has not attended any groups but did meet with unit therapist this AM.  CM will be visiting patient on unit today.  Meds continue to be reviewed/adjusted per MD's     Discharge Plan or Goal:  Home with family friends  Psychiatry  Therapy    Barriers to Discharge:  Ongoing depression/SI    Referral Status:  None today    Legal Status:  Voluntary    Contacts:  Psychiatrist: Jazmin JASON  [facility] -[address] , [place] [address] [phone]     Primary Physician: [facility] Marie/Rsvl     Therapist: Naomi Schoenberg @ [facility] - [address] , [place] [address] [phone]     [facility] : Elbert Heard: [phone]  [facility] ([facility])       Upcoming Meetings and Dates/Important Information and next steps:  Treatment update  Wed

## 2024-04-25 NOTE — PLAN OF CARE
Initial meeting note:    Therapist introduced self to patient and discussed psychotherapy service available to patient.     Pt response: Pt expressed interest in meeting 1:1 another time.    Plan: Made plan to meet with Pt later this week.      OBINNA Cuba  Psychotherapist

## 2024-04-25 NOTE — PLAN OF CARE
The pt visible out on [facility] most of the shift watching TV in lounge and minimally social but mostly kept to self. Upon approach presented anxious in mood, flat/blunted in affect, guarded but brief in conversation, endorsed both anxiety and depression 7/10 attributed trigger as passive intrusive SI thought and hopelessness with plan to jump off bridge stating on and off at a time, denied urge to hurt self/other, denied of hallucination and contracted for safety. PRN Hydroxyzine given for anxiety and helpful reported. Appetite good ate 75% supper and adequate fluid intake. No behavioral escalation or safety concern noted/reported. Comply with med and care. The pt needs encouragement to social with peer and appear playing cards.      Problem: Adult Behavioral Health Plan of Care  Goal: Plan of Care Review  Outcome: Progressing  Flowsheets (Taken 4/25/2024 1630)  Plan of Care Reviewed With: patient  Overall Patient Progress: improving  Patient Agreement with Plan of Care: agrees  Goal: Develops/Participates in [facility] [place] to Support Successful Transition  Outcome: Progressing  Intervention: Foster Therapeutic [place]  Recent Flowsheet Documentation  Taken 4/25/2024 1630 by Idania Hernandez RN  Trust Relationship/Rapport:   care explained                                                 choices provided   emotional support provided                           empathic listening provided   reassurance provided                                      thoughts/feelings acknowledged     Problem: Suicide Risk  Goal: Absence of Self-Harm  Outcome: Progressing  Intervention: Assess Risk to Self and Maintain Safety  Recent Flowsheet Documentation  Taken 4/25/2024 1630 by Idania Hernandez RN  Behavior Management: behavioral plan reviewed  Self-Harm Prevention: environmental self-harm risks assessed  Intervention: Promote Psychosocial Wellbeing  Recent Flowsheet Documentation  Taken 4/25/2024 1630 by Idania Hernandez  RN  Supportive Measures:   active listening utilized                                     self-care encouraged   self-reflection promoted                                   self-responsibility promoted   verbalization of feelings encouraged  Family/Support System Care:   involvement promoted                                      presence promoted   self-care encouraged  Intervention: Establish Safety Plan and Continuity of Care  Recent Flowsheet Documentation  Taken 4/25/2024 1630 by Idania Hernandez RN  Safe Transition Promotion: protective factors promoted     Problem: Depression  Goal: Improved Mood  Outcome: Progressing  Intervention: Monitor and Manage Depressive Symptoms  Recent Flowsheet Documentation  Taken 4/25/2024 1630 by Idania Hernandez RN  Supportive Measures:   active listening utilized                                     self-care encouraged   self-reflection promoted                                   self-responsibility promoted   verbalization of feelings encouraged  Family/Support System Care:   involvement promoted                                      presence promoted   self-care encouraged     Problem: Anxiety  Goal: Anxiety Reduction or Resolution  Outcome: Progressing  Intervention: Promote Anxiety Reduction  Recent Flowsheet Documentation  Taken 4/25/2024 1630 by Idania Hernandez RN  Supportive Measures:   active listening utilized                                     self-care encouraged   self-reflection promoted                                   self-responsibility promoted   verbalization of feelings encouraged  Family/Support System Care:   involvement promoted                                      presence promoted   self-care encouraged     Problem: Suicidal Behavior  Goal: Suicidal Behavior is Absent or Managed  Outcome: Progressing  Intervention: Provide Immediate and Ongoing Protective Physical Environment  Recent Flowsheet Documentation  Taken 4/25/2024 1630 by Idania Hernandez  RN  Safe Transition Promotion: protective factors promoted   Goal Outcome Evaluation:    Plan of Care Reviewed With: patient Plan of Care Reviewed With: patient    Overall Patient Progress: improvingOverall Patient Progress: improving

## 2024-04-25 NOTE — PLAN OF CARE
"Goal Outcome Evaluation:    Plan of Care Reviewed With: patient Plan of Care Reviewed With: patient    Overall Patient Progress: improvingOverall Patient Progress: improving       Problem: Adult Behavioral Health Plan of Care  Goal: Plan of Care Review  4/24/2024 2203 by Dori Cain RN  Outcome: Progressing  Flowsheets  Taken 4/24/2024 2203  Plan of Care Reviewed With: patient  Overall Patient Progress: improving  Patient Agreement with Plan of Care: agrees  Taken 4/24/2024 1600  Patient Agreement with Plan of Care: agrees     Pt was more visible in the lounge majority of this evening, was sitting watching TV but no interactions with peers or staff. Eating and drinking adequately. Hygiene appears appropriate to situation. Continue endorsing depression and anxiety. PRN Hydroxyzine was given. Pt denies SI/HI/SIB, hallucinations. Pt on SIB/SI precautions. Safety was maintained throughout the shift.Will continue to monitor and offer supports. Blood pressure 122/87, pulse 79, temperature 98.3  F (36.8  C), temperature source Temporal, resp. rate 16, height 1.727 m (5' 8\"), weight 110.6 kg (243 lb 14.4 oz), SpO2 96%.        "

## 2024-04-25 NOTE — PLAN OF CARE
"Individual Therapy Note      Date of Service: April 25, 2024    Patient: Pj goes by \"Pj,\" uses he/him pronouns    Individuals Present: Tamanna Lima [facility]    Session start: 0855  Session end: 0920  Session duration in minutes: 25    Patient Active Problem List   Diagnosis    Major depressive disorder, recurrent episode, moderate (H)    Autistic disorder, residual state    Suicidal ideation         Modality Used:Person Centered, Rapport Building, and Solution Focused    Goals: Identify triggers that lead to SI    Patient Description of current symptoms: Tired, hopeless, currently experiencing SI     Mental Status Exam:   Attitude: cooperative, somewhat guarded  Eye Contact: good  Mood: depressed  Affect: intensity is blunted  Speech: clear, coherent  Psychomotor Behavior: no evidence of tardive dyskinesia, dystonia, or tics  Thought Process:  logical and linear  Associations: no loose associations  Thought Content: passive suicidal ideation present  Insight: fair  Judgement: fair  Attention Span and Concentration: fair    Pt progress: Focus of session was on goal setting and exploring life stressors. Pt reports his top 3 stressors are financial stress, sexuality, and body image.     Pt reports the house he is living in was sold, and he was not invited to live with housemates in new housing. Pt reports he made some \"poor impulsive choices\" that have led to issues with housemates. Pt reports he is bisexual and came out to his housemates but not his parents. Reports his parents are [place] and worries what they will think. Pt also reports he struggles with body image as he does not like the way he looks. Writer validated his feelings/experiences, normalized feeling depressed d/t his numerous stressors. Pt shared he discusses those stressors with outpatient therapist. Discussed coping skills; Pt reports deep breathing is not helpful for controlling impulses, does process sexuality and body image " issues with outpatient therapist.    Pt shared being in the hospital is the loneliest he has ever felt in his life d/t not having access to social media. Reports being on social media 5-6 hours/day; sometimes makes him feel good sometimes lonely. Writer encouraged Pt to reflect on benefits/drawbacks, what he could spend his time on if he reduced usage.  Engaged in safety planning; Pt endorsed SI, struggled to answer what is important to him and worth living for.    Treatment Objective(s) Addressed:   The focus of this session was on rapport building, orienting the patient to therapy, identifying and practicing coping strategies, safety planning, and identifying treatment goals     Progress Towards Goals and Assessment of Patient:   Unable to assess progress towards goals - first meeting with Pt.     Therapeutic Intervention(s):   Provided active listening, unconditional positive regard, and validation. Engaged in safety planning.  Engaged in guided discovery, explored patient's perspectives and helped expand them through socratic dialogue. Identified and practiced coping skills.    Plan/next step: Writer will continue to check in with Pt, encouraged Pt to attend group sessions.      [id number] - Psychotherapy (with patient) - 30 (16-37*) min      OBINNA Cuba  Psychotherapist

## 2024-04-26 PROCEDURE — 250N000009 HC RX 250: Performed by: PHYSICIAN ASSISTANT

## 2024-04-26 PROCEDURE — 250N000013 HC RX MED GY IP 250 OP 250 PS 637

## 2024-04-26 PROCEDURE — 250N000013 HC RX MED GY IP 250 OP 250 PS 637: Performed by: STUDENT IN AN ORGANIZED HEALTH CARE EDUCATION/TRAINING PROGRAM

## 2024-04-26 PROCEDURE — 124N000002 HC R&B MH UMMC

## 2024-04-26 PROCEDURE — 99232 SBSQ HOSP IP/OBS MODERATE 35: CPT | Mod: GC | Performed by: STUDENT IN AN ORGANIZED HEALTH CARE EDUCATION/TRAINING PROGRAM

## 2024-04-26 RX ORDER — TETRAHYDROZOLINE HCL 0.05 %
1 DROPS OPHTHALMIC (EYE) 4 TIMES DAILY PRN
Status: DISCONTINUED | OUTPATIENT
Start: 2024-04-26 | End: 2024-04-26

## 2024-04-26 RX ORDER — TETRAHYDROZOLINE HCL 0.05 %
1 DROPS OPHTHALMIC (EYE) 4 TIMES DAILY PRN
Status: ACTIVE | OUTPATIENT
Start: 2024-04-26 | End: 2024-04-28

## 2024-04-26 RX ADMIN — PRAZOSIN HYDROCHLORIDE 2 MG: 2 CAPSULE ORAL at 21:21

## 2024-04-26 RX ADMIN — ERYTHROMYCIN 1 G: 5 OINTMENT OPHTHALMIC at 16:48

## 2024-04-26 RX ADMIN — POLYETHYLENE GLYCOL 400 AND PROPYLENE GLYCOL 1 DROP: 4; 3 SOLUTION/ DROPS OPHTHALMIC at 12:36

## 2024-04-26 RX ADMIN — FLUOXETINE HYDROCHLORIDE 20 MG: 20 CAPSULE ORAL at 21:21

## 2024-04-26 RX ADMIN — ERYTHROMYCIN 1 G: 5 OINTMENT OPHTHALMIC at 08:36

## 2024-04-26 RX ADMIN — ERYTHROMYCIN 1 G: 5 OINTMENT OPHTHALMIC at 20:11

## 2024-04-26 RX ADMIN — ARIPIPRAZOLE 5 MG: 5 TABLET ORAL at 21:21

## 2024-04-26 RX ADMIN — ERYTHROMYCIN 1 G: 5 OINTMENT OPHTHALMIC at 12:36

## 2024-04-26 RX ADMIN — HYDROXYZINE HYDROCHLORIDE 25 MG: 25 TABLET, FILM COATED ORAL at 16:47

## 2024-04-26 ASSESSMENT — ACTIVITIES OF DAILY LIVING (ADL)
ADLS_ACUITY_SCORE: 43
LAUNDRY: WITH SUPERVISION
ADLS_ACUITY_SCORE: 43
HYGIENE/GROOMING: INDEPENDENT
ADLS_ACUITY_SCORE: 43
ORAL_HYGIENE: INDEPENDENT
ADLS_ACUITY_SCORE: 43
ORAL_HYGIENE: INDEPENDENT
LAUNDRY: WITH SUPERVISION
HYGIENE/GROOMING: INDEPENDENT;SHOWER
DRESS: INDEPENDENT
ADLS_ACUITY_SCORE: 43
DRESS: INDEPENDENT
ADLS_ACUITY_SCORE: 43

## 2024-04-26 NOTE — PROGRESS NOTES
"  ----------------------------------------------------------------------------------------------------------  [facility]  Psychiatry Progress Note  Hospital Day #3     Interim History:     The patient's care was discussed with the treatment team and chart notes were reviewed.    Vitals: Hypertensive to 140s, otherwise VSS  Sleep: 7 hours (04/26/24 0615)  Scheduled medications: Took all scheduled medications as prescribed  Psychiatric PRN medications:   Last 24H PRN:     hydrOXYzine HCl (ATARAX) tablet 25 mg, 25 mg at 04/25/24 1625      Staff Report:        The pt visible out on [facility] most of the shift watching TV in lounge and minimally social but mostly kept to self. Upon approach presented anxious in mood, flat/blunted in affect, guarded but brief in conversation, endorsed both anxiety and depression 7/10 attributed trigger as passive intrusive SI thought and hopelessness with plan to jump off bridge stating on and off at a time, denied urge to hurt self/other, denied of hallucination and contracted for safety. PRN Hydroxyzine given for anxiety and helpful reported. Appetite good ate 75% supper and adequate fluid intake. No behavioral escalation or safety concern noted/reported. Comply with med and care. The pt needs encouragement to social with peer and appear playing cards.        Please see staff results for further details.        Subjective:     Patient Interview:    Yesterday - \"about the same\" - discussed Elbert's visit. States that he needs a waiver for the housing situation that mom had set up. Not sure of other options at this time.    Medicine team and eyedrops - feeling \"a little better\" . Informed about the lubricating eye drops     States he just watched tv yesterday, didn't attend groups. States the he slept \"a little bit better\" last night - usually has problems falling and staying asleep. Has taken prazosin and hydroxyzine for sleep in the past. " "    Reports intrusive thoughts are the most bothersome symptom for him - both about harming himself and others. Other intrusive thoughts are about \"random stuff\"    Depression still the same as yesterday ~7/10, anxiety same as well ~7/10.     Coping strategies: breathing     Patient would like to have his street clothes today.         ----------------------  Pj Tucker was seen sitting quietly in the milieu, watching TV. He is agreeable to being interviewed in the conference room today. He states his day yesterday was fine, did not meet any other peers or staff on the unit. This morning he is feeling \"not any different\". He denies any issues with sleep last night.    The team informs the patient that Elbert, his , will be visiting the unit regarding the living situation that mom set up. Patient reports he doesn't know what kind or living situation his mom has set him up with.     Patient reports he's had discharge, puffiness, itchiness of both of his eyes over the past 1-2 years. Denies seasonal allergies. Previously went to a doctor who said it was allergic conjunctivitis and was given eye drops, but didn't feel they helped and is not currently using. Agreeable to a medicine consult to talk about the issues he's having with his eyes.    Endorses feleing more down since starting the Lamictal - his states he and his psychiatrist started this as an \"extra mood stabilizer\". He would like to stop the Lamictal and is informed that the team agrees with this change. We will focus on optimizing his other medications.     He reports that his depression is a 7/10, and his anxiety is \"about the same\", around a 7/10. He denies current SI, but has had some since he last talked with the team. Endorses that these thoughts were the same plan for ending his life that he had when he came into the hospital.    Informed that the team will be contacting his outpatient psychiatrist and he is agreeable to signing an DOMINGO. " "    Pj denies any additional questions or concerns to address with the team at this time.    Phone call with therapist, Mindy, 4/25  Mindy reports that she began seeing Pj August 2023, albeit with a lapse of time since due to financial issues. She reports his intrusive thoughts have been consistent, with thoughts of suicide but with no prior action or plan. Did endorse thoughts generally about jumping off of a bridge. She developed a safety plan with him, which he did follow over the weekend leading to his admission. She met with him on Monday. Endorses a lot of stressors for him right now that may be contributing to his presentation/worsening SI. They had recently been working on relationships and how to connect with others. She believes he would benefit from increased structure in his days and more supports around him. Hoping MA goes through so that they can meet twice weekly.     ROS:  Negative unless stated above     Objective:     Vitals:  BP (!) 136/97 (BP Location: Right arm, Patient Position: Sitting, Cuff Size: Adult Large)   Pulse 92   Temp 98.1  F (36.7  C) (Oral)   Resp 16   Ht 1.727 m (5' 8\")   Wt 111.7 kg (246 lb 4.8 oz)   SpO2 96%   BMI 37.45 kg/m      Allergies:  No Known Allergies    Current Medications:  Scheduled:  Current Facility-Administered Medications   Medication Dose Route Frequency Provider Last Rate Last Admin    acetaminophen (TYLENOL) tablet 325 mg  325 mg Oral Q4H PRN Tyrese Moe MD        ARIPiprazole (ABILIFY) tablet 5 mg  5 mg Oral At Bedtime Tyrese Moe MD   5 mg at 04/25/24 2055    erythromycin (ROMYCIN) ophthalmic ointment   Both Eyes 4x Daily Ursula Strange PA-C   1 g at 04/25/24 2055    FLUoxetine (PROzac) capsule 20 mg  20 mg Oral At Bedtime Tyrese Moe MD   20 mg at 04/25/24 2054    hydrOXYzine HCl (ATARAX) tablet 25 mg  25 mg Oral BID PRN Tyrese Moe MD   25 mg at 04/25/24 1625    OLANZapine (zyPREXA) tablet 5 mg  5 mg " Oral TID PRN Tyrese Moe MD        Or    OLANZapine (zyPREXA) injection 5 mg  5 mg Intramuscular TID PRN Tyrese Moe MD        polyethylene glycol (MIRALAX) Packet 17 g  17 g Oral Daily PRN Tyrese Moe MD        prazosin (MINIPRESS) capsule 2 mg  2 mg Oral At Bedtime Tyrese Moe MD   2 mg at 04/25/24 2054       PRN:  Current Facility-Administered Medications   Medication Dose Route Frequency Provider Last Rate Last Admin    acetaminophen (TYLENOL) tablet 325 mg  325 mg Oral Q4H PRN Tyrese Moe MD        ARIPiprazole (ABILIFY) tablet 5 mg  5 mg Oral At Bedtime Tyrese Moe MD   5 mg at 04/25/24 2055    erythromycin (ROMYCIN) ophthalmic ointment   Both Eyes 4x Daily Ursula Strange PA-C   1 g at 04/25/24 2055    FLUoxetine (PROzac) capsule 20 mg  20 mg Oral At Bedtime Tyrese Moe MD   20 mg at 04/25/24 2054    hydrOXYzine HCl (ATARAX) tablet 25 mg  25 mg Oral BID PRN Tyrese Moe MD   25 mg at 04/25/24 1625    OLANZapine (zyPREXA) tablet 5 mg  5 mg Oral TID PRN Tyrese Moe MD        Or    OLANZapine (zyPREXA) injection 5 mg  5 mg Intramuscular TID PRN Tyrese Moe MD        polyethylene glycol (MIRALAX) Packet 17 g  17 g Oral Daily PRN Tyrese Moe MD        prazosin (MINIPRESS) capsule 2 mg  2 mg Oral At Bedtime Tyrese Moe MD   2 mg at 04/25/24 2054       Labs and Imaging:  New results:   No results found for this or any previous visit (from the past 24 hour(s)).      Data this admission:  - CBC unremarkable  - CMP unremarkable  - TSH normal  - UDS negative  - Hgb A1c normal, 5.3  - Lipids remarkable for low HDL cholesterol, high LDL cholesterol  - EKG normal sinus rhythm, QTc 427     Mental Status Exam:     Oriented to:  Grossly Oriented  General:  Awake and Alert  Appearance:  appears stated age, overweight, and phenotype consistent with velocardiofacial syndrome  Behavior/Attitude:  Calm, Cooperative,  "and Engaged  Eye Contact: Appropriate  Psychomotor: Normal and No evidence of tics, dystonia, or tardive dyskinesia  no catatonia present  Speech:  appropriate volume/tone, paucity, and hesitant  Language: Fluent in English with appropriate syntax and vocabulary.  Mood:  \"okay\"  Affect:  appropriate, congruent with mood, restricted, and anxious  Thought Process:  linear and coherent  Thought Content:   No SI/HI/AH/VH and does not appear to be responding to internal stimuli; No apparent delusions  Associations:  intact  Insight:  fair due to understanding of condition, goals for admission  Judgment:  fair due to voluntary presentation  Impulse control: partial  Attention Span:  grossly intact and adequate for conversation  Concentration:  grossly intact  Recent and Remote Memory:  not formally assessed  Fund of Knowledge: estimated below average  Muscle Strength and Tone:  not formally assessed  Gait and Station: Normal     Psychiatric Assessment     Pj Tucker is a 29 year old male previously diagnosed with MDD, ADHD, autism spectrum disorder who presented voluntarily by himself with suicidal  in the context of increased psychosocial stressors, medication non-adherence. No prior psychiatric hospitalizations. Significant symptoms on admission include suicidal ideation with plan, increased impulsivity. The MSE on admission was pertinent for dysphoric but reactive affect, linear and goal directed in thought. Biological contributions to mental health presentation include diagnoses of autism spectrum disorder, medication non-adherence. Psychological contributions to mental health presentation include diagnoses of ADHD, MDD as well as insight, coping, personality.  Social factors contributing to mental health presentation include financial stressors, communication challenges. Protective factors include willingness to engage in treatment, lack of prior suicide attempts/SIB.      In summary, the patient's reported " symptoms of suicidal ideation in the context of recent psychosocial stressors are consistent with depressive disorder, most likely major depressive episode. Differentials include unspecified depressive disorder, adjustment disorder, intrusive thoughts 2/2 OCD.  He will likely benefit from medication management, stabilization and observation this admission.     Given that he currently has SI, patient warrants inpatient psychiatric hospitalization to maintain his safety.      Psychiatric Plan by Diagnosis      Today's changes:  - Discontinuing PTA Lamictal  - Medicine consult for possible allergic conjunctivitis  - Will contact outpatient psychiatrist     # Suicidal Ideation  #MDD  1. Medications:  - Prozac 20 mg daily  - Prazosin 2 mg at bedtime    Prn  - Hydroxyzine 25 mg TID PRN for anxiety     2. Pertinent Labs/Monitoring:   - Qtc 427 ms     3. Additional Plans:  - Patient will be treated in therapeutic milieu with appropriate individual and group therapies as described    # ASD  # ADHD  - Abilify 5 mg daily     Psychiatric Hospital Course:      Pj Tucker was admitted to [facility] as a voluntary patient.  Medications:  PTA Abilify, Prozac, prazosin, and hydroxyzine were continued.   PTA Lamictal was discontinued 4/25 due to status decline since initiation 1-2 weeks ago.  No new medications started at the time of admission.     The risks, benefits, alternatives, and side effects were discussed and understood by the patient.     Medical Assessment and Plan     Medical diagnoses to be addressed this admission:  N/A    Medical course: Patient was physically examined by the ED prior to being transferred to the unit and was found to be medically stable and appropriate for admission.     Consults: medicine     Checklist     Legal Status: Voluntary     Safety Assessment:   Behavioral Orders   Procedures    Code 1 - Restrict to Unit    Routine Programming     As clinically indicated    Self Injury Precaution     Status 15     Every 15 minutes.    Suicide precautions: Suicide Risk: MODERATE; Clinical rationale to override score: modification to the care environment, lack of access to a plan for self-harm     Patients on Suicide Precautions should have a Combination Diet ordered that includes a Diet selection(s) AND a Behavioral Tray selection for Safe Tray - with utensils, or Safe Tray - NO utensils       Order Specific Question:   Suicide Risk     Answer:   MODERATE     Order Specific Question:   Clinical rationale to override score:     Answer:   modification to the care environment     Order Specific Question:   Clinical rationale to override score:     Answer:   lack of access to a plan for self-harm       Risk Assessment:  Risk for harm is elevated.  Risk factors: SI, impulsive, and psychosocial stressors  Protective factors: engaged in treatment     SIO: none    Disposition: Pending stabilization, medication optimization, & development of a safe discharge plan.     Attestations     Med Student: Marisol Melton, MS4  [facility] Medical Student     I saw and evaluated the patient.  I agree with the history, findings, assessment and plan documented by Sierra Jacques.  Additional points/modifications as follows: we continue to collect collateral information for diagnostic clarity before aggressively adjusting medications. I personally spent a total of 35 minutes on care for this patient on the date of the encounter. This includes face-to-face as well as non-face-to-face time reviewing the chart, lab review, and coordination of care.     Johnny Jay MD

## 2024-04-26 NOTE — DISCHARGE SUMMARY
"                                                                                                                 ----------------------------------------------------------------------------------------------------------  [facility]   Psychiatric Discharge Summary      Pj Tucker MRN# [id number]   Age: 29 year old YOB: 1995     Date of Admission:  4/23/2024  Date of Discharge:  {DISCHARGE DATE:232638}  Admitting Physician:  Maria Esther Swanson MD  Discharge Physician:  ***, MD  **Update date of service and hit refresh**    This document serves as a transfer of care to Pj Tucker's outpatient providers.     Events Leading to Hospitalization:     \"Pj Tucker is a 29 year old male with previous psychiatric diagnoses of MDD, ADHD, autism spectrum disorder admitted from the ER on 04/23/2024 due to concern for SI in the context of psychosocial stressors including financial issues, feeling he is \"a burden to everyone in my life\" . Prescribed antidepressants as below but states he forgets to take them sometimes. He states he has had suicidal thoughts for awhile, but they have been getting worse lately. Per chart review, has a plan to jump off of a bridge and states he is very close to attempting, though he has not attempted to do so yet. Unable to contract for safety in the ER. \"    See H&P by Tyrese Moe MD on 4/23 for additional details.      Diagnoses:   Primary Psychiatric Diagnosis  #Suicidal ideation  #MDD    Secondary Psychiatric Diagnoses  #ASD  #ADHD      Psychiatric Assessment:   Pj Tucker is a 29 year old male previously diagnosed with MDD, ADHD, autism spectrum disorder who presented voluntarily by himself with suicidal  in the context of increased psychosocial stressors, medication non-adherence. No prior psychiatric hospitalizations. Significant symptoms on admission include suicidal ideation with plan, increased impulsivity. " The MSE on admission was pertinent for dysphoric but reactive affect, linear and goal directed in thought. Biological contributions to mental health presentation include diagnoses of autism spectrum disorder, medication non-adherence. Psychological contributions to mental health presentation include diagnoses of ADHD, MDD as well as insight, coping, personality.  Social factors contributing to mental health presentation include financial stressors, communication challenges. Protective factors include willingness to engage in treatment, lack of prior suicide attempts/SIB.      In summary, the patient's reported symptoms of suicidal ideation in the context of recent psychosocial stressors are consistent with depressive disorder, most likely major depressive episode. Differentials include unspecified depressive disorder, adjustment disorder, intrusive thoughts 2/2 OCD.  He will likely benefit from medication management, stabilization and observation this admission.     Given that he currently has SI, patient warrants inpatient psychiatric hospitalization to maintain his safety.        Psychiatric Hospital Course:     Pj Tucker was admitted to [facility] as a voluntary patient. The patient was placed under status 15 (15 minute checks) to ensure patient safety.  Medication Trials and Changes: risks, benefits, alternatives, and side effects were discussed and understood by the patient and other caregivers.  PTA Abilify, Prozac, prazosin, and hydroxyzine were continued.  PTA Lamictal was discontinued 4/25 due to status decline since initiation 1-2 weeks ago.  No new medications initiated.***  Level of medication adherence: adherent  Behaviors: The patient was safe and appropriate and did not require chemical/physical restraints during admission. He was cooperative with cares, had good group attendance, and was visible in the milieu.  Change in psychiatric symptoms: Over the course of this hospitalization the  "patient's symptoms of suicidal ideation, depression, and anxiety improved.  Collateral information was obtained from *** and notable for ***.   Pj was { :001393} to { :786100}. At the time of discharge he was determined to not be a danger to himself or others.     Risk Assessment:      Today Pj Tucker denies/reports ***. Patient has notable risk factors for self-harm, including {SUICIDE RISK FACTORS:406956}. However, risk is mitigated by {SUICIDE PROTECTIVE FACTORS:541668}. Therefore, based on all available evidence including the factors cited above, he does not appear to be at imminent risk for self-harm, does not meet criteria for a 72-hr hold, and therefore remains appropriate for ongoing outpatient level of care. Additional steps taken to minimize risk include: medication optimization, close psychiatric follow up and provision of crisis resources ***. Voluntary referral for {PSYCHIATRY LEVELS OF CARE:113782} was offered, he {ACCEPTED/DECLINED:262229} this offer.     Psychiatric Examination:     Mental Status Exam:  Oriented to:  {PSYCHORIENTATION:554694}  General:  {General:018714}  Appearance:  {Appearance:971272}  Behavior/Attitude:  {Behavior:713000}  Eye Contact: {EyeContact:895730}  Psychomotor: {PSYCHMOT:590486} {Catatonia:706916}  Speech:  {Speech:643664}  Language: Fluent in English with appropriate syntax and vocabulary.  Mood:  \"***\"  Affect:  {Affect:362288}  Thought Process:  {ThoughtProcess:248683}  Thought Content:   {ThoughtContent:419975}; {Delusions:690187}  Associations:  {Association:885445}  Insight:  {Quality:403586} due to ***  Judgment:  {Quality:092705} due to ***  Impulse control: {Quality:204179}  Attention Span:  {Attention:210012}  Concentration:  {Concentration:488045}  Recent and Remote Memory:  {Memory:926399}  Fund of Knowledge: {Intellect:614812}  Muscle Strength and Tone: { :619535}  Gait and Station: { :679985}     Medical Hospital Course:   Pj Tucker was " medically cleared by the ED prior to admission to the unit.     Medical Diagnoses addressed:  # bilateral purulent eye discharge    Consults: Medicine    Labs were notable for the following:  - CBC unremarkable  - CMP unremarkable  - TSH normal  - UDS negative  - Hgb A1c normal, 5.3  - Lipids remarkable for low HDL cholesterol, high LDL cholesterol  - EKG normal sinus rhythm, QTc 427     Discharge Medications:     Current Discharge Medication List        CONTINUE these medications which have NOT CHANGED    Details   ARIPiprazole (ABILIFY) 5 MG tablet Take 5 mg by mouth at bedtime      FLUoxetine (PROZAC) 20 MG capsule Take 20 mg by mouth at bedtime      hydrOXYzine HCl (ATARAX) 25 MG tablet Take 25 mg by mouth 2 times daily as needed for anxiety      lamoTRIgine (LAMICTAL) 25 MG tablet Take 25 mg by mouth 2 times daily      prazosin (MINIPRESS) 2 MG capsule Take 2 mg by mouth at bedtime           STOP taking these medications       doxycycline hyclate (VIBRAMYCIN) 100 MG capsule Comments:   Reason for Stopping:                Discharge Plan:   Medications as above  Psychiatric Appointments: ***   Psychotherapy Appointments: ***  Referrals: ***  Medical follow up: ***     Attestations:     {ATTESTATIONS:546945}

## 2024-04-26 NOTE — PLAN OF CARE
Problem: Sleep Disturbance  Goal: Adequate Sleep/Rest  Outcome: Progressing   Goal Outcome Evaluation:  Patient appears to have slept for 7 hours. No complaints of pain or requests for prn's during the shift. Respirations regular and non-labored during routine safety checks.  Safety checks in place q 15 minutes. No behavioral issues.

## 2024-04-26 NOTE — PLAN OF CARE
Assessment/Intervention/Current Symtoms and Care Coordination:  Chart reviewed and patient met with team,   Discussed patient progress, symptomology, and response to treatment.  Discussed the discharge plan and any potential impediments to discharge.     Patient met with team. He reports no improvement in mood/anxiety as yet. Reports ongoing  intrusive thoughts and poor sleep.   Continues to endorse SI- feeling very lonely. Affect remains flat.  Patient met with Carolyn GAMEZ to work on county forms.  Request has been placed with [place] for [place] [employer] to apply for CADI waiver.  Meds continue to be reviewed/adjusted per MD's.  MD's trying to reach outpatient provider.     Discharge Plan or Goal:  Home with family friends  Psychiatry  Therapy     Barriers to Discharge:  Ongoing depression/SI     Referral Status:  None today     Legal Status:  Voluntary     Contacts:  Psychiatrist: Jazmin JASON  [facility] , [place] [address] [phone]     Primary Physician: [facility]     Therapist: Naomi Schoenberg @ [facility] , [place] [address] [phone]     [facility] : Elbert Heard: [phone]  [facility] ([facility])        Upcoming Meetings and Dates/Important Information and next steps:  Treatment update  Wed

## 2024-04-26 NOTE — PLAN OF CARE
"RN Shift Assessment for Pj  [phone]     Pt said he was feeling a little better today than yesterday, and sleep was a little better, still not great. No safety concerns.   Verbalized Emotional State: anxiety, depression, acceptance  Anxiety: 7/10 Anxiety symptoms: Stress, Unease, Sleep-related problems, Avoidance, and Obsessions and Compulsions  Depression: 7/10 Depression symptoms: Isolative, Low energy, Ongoing sadness, Anhedonia, Worthlessness, Helplessness, Hopelessness, and Sleep-related problems  SI: yes, active, and passive   SIB: no Self Injury WDL: WDL  Feels Like Hurting Others: no  Overt Aggression WDL: WDL Duty to Inform: NA   Safety Concerns Expressed: No Contracted for Safety: yes    PRN Medications Administered this shift: Yes - please explain: eyedrops x1  Scheduled Medications given: yes   Medication Side effects/ Concerns: No     Perceptual State: consistent with reality  Hallucinations: denies hallucinations  Delusions: no delusions  Thought Content: hopelessness, impoverished, preoccupation  Behavior Interactions: avoids social contact  Emotion/Mood: anxious, depressed  Affect: blunted    ADL's: Pt given his own clothes to wear this shift, pt showered. Independent, no concerns  Nutrition: 3-->adequate  Sleep/Rest/Relaxation: Reports sleep is better today than yesterday, but still not great  Patient Currently in Pain: denies        /84 (BP Location: Right arm)   Pulse 70   Temp (!) 96.7  F (35.9  C) (Temporal)   Resp 18   Ht 1.727 m (5' 8\")   Wt 111.7 kg (246 lb 4.8 oz)   SpO2 97%   BMI 37.45 kg/m       Problem: Adult Behavioral Health Plan of Care  Goal: Develops/Participates in Therapeutic [place] to Support Successful Transition  Outcome: Progressing  Intervention: Foster Therapeutic [place]  Recent Flowsheet Documentation  Taken 4/26/2024 6512 by Glenis Ellis, RN  Trust Relationship/Rapport:   care explained   choices provided   emotional support provided   empathic " listening provided   questions answered   questions encouraged   reassurance provided   thoughts/feelings acknowledged   Goal Outcome Evaluation:    Plan of Care Reviewed With: patient

## 2024-04-26 NOTE — PLAN OF CARE
BEH IP Unit Acuity Rating Score (UARS)  Patient is given one point for every criteria they meet.     CRITERIA SCORING   On a 72 hour hold, court hold, committed, stay of commitment, or revocation. 0    Patient LOS on BEH unit exceeds 20 days. 0  LOS: 3   Patient under guardianship, 55+, otherwise medically complex, or under age 11. 0   Suicide ideation without relief of precipitating factors. 1   Current plan for suicide. 0   Current plan for homicide. 0   Imminent risk or actual attempt to seriously harm another without relief of factors precipitating the attempt. 1   Severe dysfunction in daily living (ex: complete neglect for self care, extreme disruption in vegetative function, extreme deterioration in social interactions). 1   Recent (last 7 days) or current physical aggression in the ED or on unit. 0   Restraints or seclusion episode in past 72 hours. 0   Recent (last 7 days) or current verbal aggression, agitation, yelling, etc., while in the ED or unit. 0   Active psychosis. 0   Need for constant or near constant redirection (from leaving, from others, etc).  0   Intrusive or disruptive behaviors. 0   Patient requires 3 or more hours of individualized nursing care per 8-hour shift (i.e. for ADLs, meds, therapeutic interventions). 0   TOTAL 3

## 2024-04-27 PROCEDURE — 250N000009 HC RX 250: Performed by: PHYSICIAN ASSISTANT

## 2024-04-27 PROCEDURE — 250N000013 HC RX MED GY IP 250 OP 250 PS 637: Performed by: STUDENT IN AN ORGANIZED HEALTH CARE EDUCATION/TRAINING PROGRAM

## 2024-04-27 PROCEDURE — 250N000013 HC RX MED GY IP 250 OP 250 PS 637

## 2024-04-27 PROCEDURE — 124N000002 HC R&B MH UMMC

## 2024-04-27 RX ADMIN — ERYTHROMYCIN 1 G: 5 OINTMENT OPHTHALMIC at 16:15

## 2024-04-27 RX ADMIN — PRAZOSIN HYDROCHLORIDE 2 MG: 2 CAPSULE ORAL at 21:12

## 2024-04-27 RX ADMIN — ERYTHROMYCIN 1 G: 5 OINTMENT OPHTHALMIC at 20:20

## 2024-04-27 RX ADMIN — ERYTHROMYCIN 1 G: 5 OINTMENT OPHTHALMIC at 13:08

## 2024-04-27 RX ADMIN — ERYTHROMYCIN 1 G: 5 OINTMENT OPHTHALMIC at 09:48

## 2024-04-27 RX ADMIN — ARIPIPRAZOLE 5 MG: 5 TABLET ORAL at 21:12

## 2024-04-27 RX ADMIN — POLYETHYLENE GLYCOL 400 AND PROPYLENE GLYCOL 1 DROP: 4; 3 SOLUTION/ DROPS OPHTHALMIC at 09:48

## 2024-04-27 RX ADMIN — FLUOXETINE HYDROCHLORIDE 20 MG: 20 CAPSULE ORAL at 21:12

## 2024-04-27 ASSESSMENT — ACTIVITIES OF DAILY LIVING (ADL)
ADLS_ACUITY_SCORE: 43
ORAL_HYGIENE: INDEPENDENT
ADLS_ACUITY_SCORE: 43
DRESS: INDEPENDENT
ADLS_ACUITY_SCORE: 43
HYGIENE/GROOMING: INDEPENDENT
ADLS_ACUITY_SCORE: 43

## 2024-04-27 NOTE — PLAN OF CARE
Problem: Sleep Disturbance  Goal: Adequate Sleep/Rest  Outcome: Progressing   Goal Outcome Evaluation:  Patient was observed to have slept for 7 hours. No acute events during the night. No complaints of pain. No requests for prn's.. safety checks in place q 15 minutes.

## 2024-04-27 NOTE — PLAN OF CARE
"Goal Outcome Evaluation:    Plan of Care Reviewed With: patient        Patient was observed watching TV in the lounge. Patient was isolative and withdrawn to self. Patient interacted minimally with peers. Patient denies pain. Patient endorses anxiety and depression rated 7/10. Prn hydroxyzine was given for anxiety with some relief. Patient Patient confirms suicidal ideations with a plan to \"jump off the bridge\". When asked whether he will act on it, patient states; \"I'm not sure whether I will act on the thoughts\". Patient denies homicidal, auditory, and visual hallucinations. Contracts for safety.Patient was med compliant.Vitals were within normal limit. Patient denies bowel and bladder issues.           "

## 2024-04-27 NOTE — PLAN OF CARE
"RN Shift Assessment for Pj  [phone]     Pt reports symptoms are the same as yesterday and the day before etc, states no improvement or decline. Responses were mostly \"yeah, no or I don't know.\" Pt did not attend groups, was sitting on the perimeter of lounge watching tv much of shift, ate meals in milieu, adequate intake, hydration.     States his dad might come tonight to visit and bring clothes as he is without pants without drawstrings.     Pt denies safety concerns.     Verbalized Emotional State: anxiety, depression, acceptance, suicidal thoughts  Anxiety: 7/10 Anxiety symptoms: Pt unable to identify  Depression: 7/10 Depression symptoms: Nothing specific identified  SI: yes and passive   SIB: no Self Injury WDL: WDL  Feels Like Hurting Others: no  Overt Aggression WDL: WDL Duty to Inform: NA   Safety Concerns Expressed: No Contracted for Safety: yes    PRN Medications Administered this shift: Yes - please explain: lubricated eye drops .  Scheduled Medications given: yes   Medication Side effects/ Concerns: No      Perceptual State: consistent with reality as far   Hallucinations: denies hallucinations but response was vague and uncertain  Delusions: obsessions  Judgment and Insight: insight not appropriate to situation, judgment not appropriate to situation  Thought Content: hopelessness, impoverished, suicidal thoughts, preoccupation, obsession  Behavior: Interactions: avoids social contact  Emotion/Mood: anxious, depressed  Affect: flat, restricted  Speech: response lag, slow speech pattern, paucity  Nutrition: 3-->adequate  Sleep/Rest/Relaxation: appears asleep     General Appearance WDL: pt showered and shaved this shift.   Patient Currently in Pain: denies        /87 (BP Location: Left arm, Patient Position: Sitting, Cuff Size: Adult Regular)   Pulse 87   Temp 97.6  F (36.4  C) (Oral)   Resp 18   Ht 1.727 m (5' 8\")   Wt 111.7 kg (246 lb 4.8 oz)   SpO2 95%   BMI 37.45 kg/m  "       Problem: Adult Behavioral Health Plan of Care  Goal: Optimized Coping Skills in Response to Life Stressors  Outcome: Progressing   Goal Outcome Evaluation:    Plan of Care Reviewed With: patient

## 2024-04-28 PROCEDURE — 250N000013 HC RX MED GY IP 250 OP 250 PS 637

## 2024-04-28 PROCEDURE — 250N000009 HC RX 250: Performed by: PHYSICIAN ASSISTANT

## 2024-04-28 PROCEDURE — 250N000013 HC RX MED GY IP 250 OP 250 PS 637: Performed by: STUDENT IN AN ORGANIZED HEALTH CARE EDUCATION/TRAINING PROGRAM

## 2024-04-28 PROCEDURE — 124N000002 HC R&B MH UMMC

## 2024-04-28 RX ORDER — ONDANSETRON 4 MG/1
4 TABLET, FILM COATED ORAL EVERY 6 HOURS PRN
Status: DISCONTINUED | OUTPATIENT
Start: 2024-04-28 | End: 2024-05-15 | Stop reason: HOSPADM

## 2024-04-28 RX ADMIN — POLYETHYLENE GLYCOL 400 AND PROPYLENE GLYCOL 1 DROP: 4; 3 SOLUTION/ DROPS OPHTHALMIC at 17:02

## 2024-04-28 RX ADMIN — PRAZOSIN HYDROCHLORIDE 2 MG: 2 CAPSULE ORAL at 21:00

## 2024-04-28 RX ADMIN — ERYTHROMYCIN 1 G: 5 OINTMENT OPHTHALMIC at 16:58

## 2024-04-28 RX ADMIN — FLUOXETINE HYDROCHLORIDE 20 MG: 20 CAPSULE ORAL at 21:00

## 2024-04-28 RX ADMIN — ARIPIPRAZOLE 5 MG: 5 TABLET ORAL at 21:00

## 2024-04-28 RX ADMIN — ERYTHROMYCIN 1 G: 5 OINTMENT OPHTHALMIC at 21:00

## 2024-04-28 RX ADMIN — ERYTHROMYCIN 1 G: 5 OINTMENT OPHTHALMIC at 10:16

## 2024-04-28 RX ADMIN — ERYTHROMYCIN 1 G: 5 OINTMENT OPHTHALMIC at 12:58

## 2024-04-28 ASSESSMENT — ACTIVITIES OF DAILY LIVING (ADL)
ADLS_ACUITY_SCORE: 43
DRESS: INDEPENDENT;STREET CLOTHES
ADLS_ACUITY_SCORE: 43
ORAL_HYGIENE: PROMPTS;INDEPENDENT
LAUNDRY: WITH SUPERVISION
ADLS_ACUITY_SCORE: 43
HYGIENE/GROOMING: INDEPENDENT;SHOWER
ADLS_ACUITY_SCORE: 43
DRESS: STREET CLOTHES
ADLS_ACUITY_SCORE: 43
ORAL_HYGIENE: INDEPENDENT;PROMPTS
ADLS_ACUITY_SCORE: 43
LAUNDRY: WITH SUPERVISION
ADLS_ACUITY_SCORE: 43
ADLS_ACUITY_SCORE: 43
HYGIENE/GROOMING: INDEPENDENT
ADLS_ACUITY_SCORE: 43

## 2024-04-28 NOTE — PLAN OF CARE
Problem: Adult Behavioral Health Plan of Care  Goal: Adheres to Safety Considerations for Self and Others  Intervention: Develop and Maintain Individualized Safety Plan  Recent Flowsheet Documentation  Taken 4/27/2024 1700 by Aren Garcia RN  Safety Measures:   clinical history reviewed   environmental rounds completed   safety plan reviewed   safety rounds completed   self-directed behavior promoted   suicide assessment completed   suicide check-in completed   Goal Outcome Evaluation:       Patient had flat affect. Mood was calm. Was visible in milieu and socialized with peers laughing and watching TV. Denied pain, anxiety, depression, covid 19 symptoms, SI/HI/SIB/AH/VH, and contracted for safety. Was medication compliant and had good appetite. Will continue to monitor patient.

## 2024-04-28 NOTE — PLAN OF CARE
Problem: Sleep Disturbance  Goal: Adequate Sleep/Rest  Outcome: Progressing   Goal Outcome Evaluation:  Patient appears to have slept for 7 hours. No complaints of pain or requests for prn's during the shift. Respirations regular and non-labored during routine safety checks.  Safety checks in place q 15 minutes.

## 2024-04-28 NOTE — PLAN OF CARE
RN Shift Assessment for Pj  [phone]     Pt expressed that he felt a lot the same as yesterday but brought up that he has been having tumescence when he thinks about suicide and that this had been going on for a while. Told pt to report this to the providers as well as concerns that he isn't feeling better since coming to the unit. Offered pt a journal to write questions he had for providers, as well as write down his thoughts. He stated he had not tried this before but was open to it so safe journal made and given to pt.     Pt endorsed intermittent nausea that has also been ongoing, alerted provider, PRN zofran ordered. Pt made aware it was available but declined when offered.     Pt contracted for safety, no suicidal behaviors observed.   Verbalized Emotional State: anxiety, depression, hopelessness, suicidal thoughts  Anxiety: 7/10 Anxiety symptoms: Stress, Unease, Nausea, Sleep-related problems, Avoidance, and Obsessions and Compulsions  Depression: 7/10 Depression symptoms: Isolative, Low energy, Ongoing sadness, Worthlessness, Helplessness, Hopelessness, Sleep-related problems, and Decreased concentration  SI: yes and passive   SIB: yes and passive Self Injury WDL: No behaviors observed, stated fleeting thoughts to harm self.   Feels Like Hurting Others: no  Overt Aggression WDL   Safety Concerns Expressed: No Contracted for Safety: yes    PRN Medications Administered this shift: No  Scheduled Medications given: yes   Medication Side effects/ Concerns: No      Perceptual State: consistent with reality  Hallucinations: denies hallucinations  Delusions: no delusions  Judgment and Insight: insight not appropriate to situation  Thought Content: obsession, helplessness, impoverished, preoccupation, suicidal thoughts, perseveration  Intellectual Performance: disoriented to situation  Level of Consciousness: alert  Behavior: Interactions: avoids social contact     Emotion/Mood: depressed, anxious,  "hopeless  Affect: blunted  Speech: paucity, response lag  Nutrition: 3-->adequate  Sleep/Rest/Relaxation: appears asleep  Activity: withdrawn, isolative  General Appearance WDL  Patient Currently in Pain: no        /89 (BP Location: Right arm, Patient Position: Sitting, Cuff Size: Adult Regular)   Pulse 88   Temp 98  F (36.7  C) (Oral)   Resp 18   Ht 1.727 m (5' 8\")   Wt 111.9 kg (246 lb 9.6 oz)   SpO2 97%   BMI 37.50 kg/m        Goal Outcome Evaluation:    Plan of Care Reviewed With: patient        "

## 2024-04-29 PROCEDURE — G0177 OPPS/PHP; TRAIN & EDUC SERV: HCPCS

## 2024-04-29 PROCEDURE — 250N000009 HC RX 250: Performed by: PHYSICIAN ASSISTANT

## 2024-04-29 PROCEDURE — 250N000013 HC RX MED GY IP 250 OP 250 PS 637

## 2024-04-29 PROCEDURE — 250N000011 HC RX IP 250 OP 636

## 2024-04-29 PROCEDURE — 99232 SBSQ HOSP IP/OBS MODERATE 35: CPT | Mod: GC | Performed by: STUDENT IN AN ORGANIZED HEALTH CARE EDUCATION/TRAINING PROGRAM

## 2024-04-29 PROCEDURE — 124N000002 HC R&B MH UMMC

## 2024-04-29 RX ADMIN — HYDROXYZINE HYDROCHLORIDE 25 MG: 25 TABLET, FILM COATED ORAL at 10:10

## 2024-04-29 RX ADMIN — Medication 7.5 MG: at 21:00

## 2024-04-29 RX ADMIN — ERYTHROMYCIN 1 G: 5 OINTMENT OPHTHALMIC at 14:16

## 2024-04-29 RX ADMIN — ERYTHROMYCIN 1 G: 5 OINTMENT OPHTHALMIC at 10:09

## 2024-04-29 RX ADMIN — PRAZOSIN HYDROCHLORIDE 2 MG: 2 CAPSULE ORAL at 21:00

## 2024-04-29 RX ADMIN — FLUOXETINE HYDROCHLORIDE 20 MG: 20 CAPSULE ORAL at 21:00

## 2024-04-29 RX ADMIN — ONDANSETRON HYDROCHLORIDE 4 MG: 4 TABLET, FILM COATED ORAL at 18:00

## 2024-04-29 RX ADMIN — ERYTHROMYCIN 1 G: 5 OINTMENT OPHTHALMIC at 20:50

## 2024-04-29 ASSESSMENT — ACTIVITIES OF DAILY LIVING (ADL)
ADLS_ACUITY_SCORE: 43
DRESS: INDEPENDENT;SCRUBS (BEHAVIORAL HEALTH)
ADLS_ACUITY_SCORE: 43
ORAL_HYGIENE: INDEPENDENT
HYGIENE/GROOMING: INDEPENDENT
ADLS_ACUITY_SCORE: 43
LAUNDRY: UNABLE TO COMPLETE
ADLS_ACUITY_SCORE: 43

## 2024-04-29 NOTE — PLAN OF CARE
"Goal Outcome Evaluation:    Pt was sleeping at the start of shift. Affect was flat but pleasant upon approach. When he was asked during the mental health assessment he endorsed thoughts of SI but agreed to contract for safe behavior. He also rated his depression and anxiety 7/10 and a PRN hydroxyzine was given. Pt changed clothes but declined a shower. He was seen in the milieu playing card with peers and attending group. After his meeting he said he was feeling \"better\" but just wanted to sleep. Pt declined vitals. No medical concerns. No behavioral concerns. Denies pain. Eyes still have a bit gunky but appear to me more while in color. Pt complaint with ointment.     "

## 2024-04-29 NOTE — PROGRESS NOTES
"  ----------------------------------------------------------------------------------------------------------  [facility]  Psychiatry Progress Note  Hospital Day #6     Interim History:     The patient's care was discussed with the treatment team and chart notes were reviewed.    Vitals: Hypertensive to 140s, otherwise VSS  Sleep: 7 hours (04/29/24 0700)  Scheduled medications: Took all scheduled medications as prescribed  Psychiatric PRN medications:   Last 24H PRN:     hydrOXYzine HCl (ATARAX) tablet 25 mg, 25 mg at 04/29/24 1010    polyethylene glycol-propylene glycol PF (SYSTANE ULTRA PF) opthalmic solution 1 drop, 1 drop at 04/28/24 1702        Staff Report:   Pt expressed that he felt a lot the same as yesterday but brought up that he has been having tumescence when he thinks about suicide and that this had been going on for a while.    Pt encouraged to report this to the providers as well as concerns that he isn't feeling better since coming to the unit. Offered pt a journal to write questions he had for providers, as well as write down his thoughts. He stated he had not tried this before but was open to it so safe journal made and given to pt.      Pt endorsed intermittent nausea that has also been ongoing, alerted provider, PRN zofran ordered. Pt made aware it was available but declined when offered.     Please see staff results for further details.     Subjective:     Patient Interview:    Patient is met with the treatment team today in the conference room. He reports his weekend was \"okay\" and states he spent time watching TV. He denies attending any groups.    Patient denies any symptomatic improvement since admission, continuing to report feeling \"about the same.\"    Patient makes vague, general statements with respect to symptoms currently bothering him & current goals for admission: he initially requests \"medications to help him stabilize\".    Following " "encouragement, patient raises issue in abovementioned staff report -- namely that he experiences \"erections\" that coincide with thoughts of SI. Patient corroborates this has been going on for some \"months\" PTA, and reports associated plan involving ending his life by jumping off a bridge. (Not entirely clear at this stage whether these thoughts are fantasies vs intrusions.)    Patient endorses similar episodic tumescence but associated with SIB involving a knife and cutting his arms.     Patient denies any HI/AH/VH today.    Patient informed of the team's recommendations to increase both PROZAC & ABILIFY doses in the short-term. Patient agrees to increase ABILIFY to 7.5 mg PO daily, today - but does report some mild concern about \"oversedation\" that reportedly occurred on an OP basis, when ABILIFY was increased to 10 mg PO daily in the past.    Patient is happy with this plan and also aims to attend a group today & looks forward to seeing the therapy dog on the unit.    Patient raises no other concerns and has no other questions for the team at this time.      Phone call with therapist, Mindy, 4/25  Mindy reports that she began seeing Pj August 2023, albeit with a lapse of time since due to financial issues. She reports his intrusive thoughts have been consistent, with thoughts of suicide but with no prior action or plan. Did endorse thoughts generally about jumping off of a bridge. She developed a safety plan with him, which he did follow over the weekend leading to his admission. She met with him on Monday. Endorses a lot of stressors for him right now that may be contributing to his presentation/worsening SI. They had recently been working on relationships and how to connect with others. She believes he would benefit from increased structure in his days and more supports around him. Hoping MA goes through so that they can meet twice weekly.     ROS:  Negative unless stated above     Objective:     Vitals:  BP " "119/81   Pulse 82   Temp 97.8  F (36.6  C) (Temporal)   Resp 18   Ht 1.727 m (5' 8\")   Wt 111.9 kg (246 lb 9.6 oz)   SpO2 96%   BMI 37.50 kg/m      Allergies:  No Known Allergies    Current Medications:  Scheduled:  Current Facility-Administered Medications   Medication Dose Route Frequency Provider Last Rate Last Admin    acetaminophen (TYLENOL) tablet 325 mg  325 mg Oral Q4H PRN Tyrese Moe MD        ARIPiprazole (ABILIFY) half-tab 7.5 mg  7.5 mg Oral At Bedtime Rojelio Baumann MD        erythromycin (ROMYCIN) ophthalmic ointment   Both Eyes 4x Daily Ursula Strange, CHIOMA   1 g at 04/29/24 1416    FLUoxetine (PROzac) capsule 20 mg  20 mg Oral At Bedtime Tyrese Moe MD   20 mg at 04/28/24 2100    hydrOXYzine HCl (ATARAX) tablet 25 mg  25 mg Oral BID PRN Tyrese Moe MD   25 mg at 04/29/24 1010    OLANZapine (zyPREXA) tablet 5 mg  5 mg Oral TID PRN Tyrese Moe MD        Or    OLANZapine (zyPREXA) injection 5 mg  5 mg Intramuscular TID PRN Tyrese Moe MD        ondansetron (ZOFRAN) tablet 4 mg  4 mg Oral Q6H PRN Lydia Dhaliwal MD        polyethylene glycol (MIRALAX) Packet 17 g  17 g Oral Daily PRN Tyrese Moe MD        polyethylene glycol-propylene glycol PF (SYSTANE ULTRA PF) opthalmic solution 1 drop  1 drop Both Eyes Q1H PRN Johnny Jay MD   1 drop at 04/28/24 1702    prazosin (MINIPRESS) capsule 2 mg  2 mg Oral At Bedtime Tyrese Moe MD   2 mg at 04/28/24 2100       PRN:  Current Facility-Administered Medications   Medication Dose Route Frequency Provider Last Rate Last Admin    acetaminophen (TYLENOL) tablet 325 mg  325 mg Oral Q4H PRN Tyrese Moe MD        ARIPiprazole (ABILIFY) half-tab 7.5 mg  7.5 mg Oral At Bedtime Rojelio Baumann MD        erythromycin (ROMYCIN) ophthalmic ointment   Both Eyes 4x Daily Ursula Strange, PA-C   1 g at 04/29/24 1416    FLUoxetine (PROzac) capsule 20 mg  20 mg Oral At Bedtime Safi-Reji, " "MD Tyrese   20 mg at 04/28/24 2100    hydrOXYzine HCl (ATARAX) tablet 25 mg  25 mg Oral BID PRN Tyrese Moe MD   25 mg at 04/29/24 1010    OLANZapine (zyPREXA) tablet 5 mg  5 mg Oral TID PRN Tyrese Moe MD        Or    OLANZapine (zyPREXA) injection 5 mg  5 mg Intramuscular TID PRN Tyrese Moe MD        ondansetron (ZOFRAN) tablet 4 mg  4 mg Oral Q6H PRN Lydia Dhaliwal MD        polyethylene glycol (MIRALAX) Packet 17 g  17 g Oral Daily PRN Tyrese Moe MD        polyethylene glycol-propylene glycol PF (SYSTANE ULTRA PF) opthalmic solution 1 drop  1 drop Both Eyes Q1H PRN Johnny Jay MD   1 drop at 04/28/24 1702    prazosin (MINIPRESS) capsule 2 mg  2 mg Oral At Bedtime Tyrese Moe MD   2 mg at 04/28/24 2100       Labs and Imaging:  New results:   No results found for this or any previous visit (from the past 24 hour(s)).      Data this admission:  - CBC unremarkable  - CMP unremarkable  - TSH normal  - UDS negative  - Hgb A1c normal, 5.3  - Lipids remarkable for low HDL cholesterol, high LDL cholesterol  - EKG normal sinus rhythm, QTc 427     Mental Status Exam:     Oriented to:  Grossly Oriented  General:  Awake and Alert  Appearance:  appears stated age, overweight, and phenotype consistent with velocardiofacial syndrome  Behavior/Attitude:  Calm, Cooperative, and Engaged  Eye Contact: Appropriate  Psychomotor: Normal and No evidence of tics, dystonia, or tardive dyskinesia  no catatonia present  Speech:  appropriate volume/tone, paucity, and hesitant  Language: Fluent in English with appropriate syntax and vocabulary.  Mood:  \"no change\"  Affect:  appropriate, congruent with mood, restricted, and anxious  Thought Process:  linear and coherent  Thought Content:    SIB involving cutting self with knife, suicidal ideation with plan (without intent), No HI, does not appear to be responding to internal stimuli, No VH, and No AH; No apparent delusions  Associations: "  intact  Insight:  fair due to understanding of condition, but limited 2/2 unclear about goals for admission  Judgment:  fair due to voluntary presentation  Impulse control: partial  Attention Span:  grossly intact and adequate for conversation  Concentration:  grossly intact  Recent and Remote Memory:  not formally assessed  Fund of Knowledge: estimated below average  Muscle Strength and Tone:  not formally assessed  Gait and Station: Normal     Psychiatric Assessment     Pj Tucker is a 29 year old male previously diagnosed with MDD, ADHD, autism spectrum disorder who presented voluntarily by himself with suicidal  in the context of increased psychosocial stressors, medication non-adherence. No prior psychiatric hospitalizations. Significant symptoms on admission include suicidal ideation with plan, increased impulsivity. The MSE on admission was pertinent for dysphoric but reactive affect, linear and goal directed in thought. Biological contributions to mental health presentation include diagnoses of autism spectrum disorder, medication non-adherence. Psychological contributions to mental health presentation include diagnoses of ADHD, MDD as well as insight, coping, personality.  Social factors contributing to mental health presentation include financial stressors, communication challenges. Protective factors include willingness to engage in treatment, lack of prior suicide attempts/SIB.      In summary, the patient's reported symptoms of suicidal ideation in the context of recent psychosocial stressors are consistent with depressive disorder, most likely major depressive episode. Differentials include unspecified depressive disorder, adjustment disorder, intrusive thoughts 2/2 OCD.  He will likely benefit from medication management, stabilization and observation this admission.     Given that he currently has SI, patient warrants inpatient psychiatric hospitalization to maintain his safety.      Psychiatric  Plan by Diagnosis      Today's changes:  - increase ABILIFY to 7.5 mg PO at bedtime (up from 5 mg PO)     # Suicidal Ideation with Plan (likely without Intent)  # SIB with Plan (likely without intent)  2/2  # MDD, decompensated  1. Medications:  - ABILIFY 7.5 mg PO qHS  - PROZAC 20 mg PO daily  - PRAZOSIN 2 mg PO at bedtime  - ATARAX 25 mg PO TID PRN (for anxiety)     2. Pertinent Labs/Monitoring:   - Qtc 427 ms     3. Additional Plans:  - Patient will be treated in therapeutic milieu with appropriate individual and group therapies as described    # ASD  # ADHD       Psychiatric Hospital Course:      Pj Tucker was admitted to [facility] as a voluntary patient.  Medications:  PTA Abilify, Prozac, prazosin, and hydroxyzine were continued.   PTA Lamictal was discontinued 4/25 due to status decline since initiation 1-2 weeks ago.  No new medications started at the time of admission.     The risks, benefits, alternatives, and side effects were discussed and understood by the patient.     Medical Assessment and Plan     Medical diagnoses to be addressed this admission:  N/A    Medical course: Patient was physically examined by the ED prior to being transferred to the unit and was found to be medically stable and appropriate for admission.     Consults: medicine     Checklist     Legal Status: Voluntary     Safety Assessment:   Behavioral Orders   Procedures    Code 1 - Restrict to Unit    Routine Programming     As clinically indicated    Self Injury Precaution    Status 15     Every 15 minutes.    Suicide precautions: Suicide Risk: MODERATE; Clinical rationale to override score: modification to the care environment, lack of access to a plan for self-harm     Patients on Suicide Precautions should have a Combination Diet ordered that includes a Diet selection(s) AND a Behavioral Tray selection for Safe Tray - with utensils, or Safe Tray - NO utensils       Order Specific Question:   Suicide Risk     Answer:    MODERATE     Order Specific Question:   Clinical rationale to override score:     Answer:   modification to the care environment     Order Specific Question:   Clinical rationale to override score:     Answer:   lack of access to a plan for self-harm       Risk Assessment:  Risk for harm is elevated.  Risk factors: SI, impulsive, and psychosocial stressors  Protective factors: engaged in treatment     SIO: none    Disposition: Pending stabilization, medication optimization, & development of a safe discharge plan.     Attestations   Rojelio Baumann MD  Psychiatry Resident  PGY1    This patient has been seen and evaluated by me, Maria Esther Swanson DO.  I have discussed this patient with the team including the resident and medical student(s) and I agree with the findings and plan in this note.  Dr. Maria Esther Swanson DO, DENISE

## 2024-04-29 NOTE — PLAN OF CARE
"Mood and Affect: Patient describes mood as \"Anxious and depressed.\" Affect is flat.    LOC and Orientation: Alert. Oriented to person, place, and time. Semi oriented to situation, patient does not seem to understand treatment plan.    Behavior and Interaction: Mostly withdrawn and isolative to self and room.    Intermittently visible in the milieu, watching TV with peers, not engaged.    Patient is calm and cooperative with nursing assessment.    Mental Health Symptoms: Patient endorses suicidal ideation, passive self injurious thoughts, moderate anxiety, moderate depression.    Patient contracts for safety.    Medical Concerns: No new concerns.    Other Concerns: Patient encouraged to ask treatment team questions surrounding discharge planning.    Medication Compliant: Patient is compliant with all scheduled medication.    PRN: None given.    Medication Side Effects: Not observed.    Fluid & Food Intake: Adequate fluid and food intake. Consumed, 100% dinner and snack.    Bowel & Bladder/Elimination: Denies problems, last bowel movement, today.    Self Care: Independent, fairly groomed.    Vital Signs: Denies pain.  /81   Pulse 82   Temp 97.8  F (36.6  C) (Temporal)   Resp 18   Ht 1.727 m (5' 8\")   Wt 111.9 kg (246 lb 9.6 oz)   SpO2 96%   BMI 37.50 kg/m        Problem: Depression  Goal: Improved Mood  Outcome: Progressing  Intervention: Monitor and Manage Depressive Symptoms  Recent Flowsheet Documentation  Taken 4/28/2024 1759 by Michelle Holly RN  Supportive Measures:   positive reinforcement provided   decision-making supported   verbalization of feelings encouraged   self-responsibility promoted   self-care encouraged   relaxation techniques promoted  Family/Support System Care:   self-care encouraged   presence promoted     Problem: Anxiety  Goal: Anxiety Reduction or Resolution  Outcome: Progressing  Intervention: Promote Anxiety Reduction  Recent Flowsheet Documentation  Taken 4/28/2024 1759 by " Michelle Holly, RN  Supportive Measures:   positive reinforcement provided   decision-making supported   verbalization of feelings encouraged   self-responsibility promoted   self-care encouraged   relaxation techniques promoted  Family/Support System Care:   self-care encouraged   presence promoted     Problem: Suicidal Behavior  Goal: Suicidal Behavior is Absent or Managed  Outcome: Progressing  Intervention: Provide Immediate and Ongoing Protective Physical Environment  Recent Flowsheet Documentation  Taken 4/28/2024 7271 by Michelle Holly, RN  Safe Transition Promotion: protective factors promoted   Goal Outcome Evaluation:    Plan of Care Reviewed With: patient

## 2024-04-29 NOTE — PLAN OF CARE
[facility] IP Unit Acuity Rating Score (UARS)  Patient is given one point for every criteria they meet.     CRITERIA SCORING   On a 72 hour hold, court hold, committed, stay of commitment, or revocation. 0    Patient LOS on [facility] unit exceeds 20 days. 0  LOS: 6   Patient under guardianship, 55+, otherwise medically complex, or under age 11. 0   Suicide ideation without relief of precipitating factors. 1   Current plan for suicide. 0   Current plan for homicide. 0   Imminent risk or actual attempt to seriously harm another without relief of factors precipitating the attempt. 1   Severe dysfunction in daily living (ex: complete neglect for self care, extreme disruption in vegetative function, extreme deterioration in social interactions). 1   Recent (last 7 days) or current physical aggression in the ED or on unit. 0   Restraints or seclusion episode in past 72 hours. 0   Recent (last 7 days) or current verbal aggression, agitation, yelling, etc., while in the ED or unit. 0   Active psychosis. 0   Need for constant or near constant redirection (from leaving, from others, etc).  0   Intrusive or disruptive behaviors. 0   Patient requires 3 or more hours of individualized nursing care per 8-hour shift (i.e. for ADLs, meds, therapeutic interventions). 0   TOTAL 3

## 2024-04-29 NOTE — PLAN OF CARE
Assessment/Intervention/Current Symtoms and Care Coordination:  Chart reviewed and patient met with team,   Discussed patient progress, symptomology, and response to treatment.  Discussed the discharge plan and any potential impediments to discharge.     Patient met with team. He continues to report no improvement in mood/anxiety as yet.  Patient did express concern that he gets an erection when he thinks about suicide. Has intrusive thoughts about jumping off a bridge, harming self with a knife.  States he feels lonely.  Will try to push self to attend a group.  Affect remains flat. Smiles s/w inappropriately at times.  Meds continue to be reviewed/adjusted per MD's.  MD's trying to reach outpatient provider.    Patient met with Carolyn GAMEZ last week to work on county forms.  Request has been placed with [facility] for [place] [employer] to apply for CADI waiver.  Meds continue to be reviewed/adjusted per MD's.  MD's trying to reach outpatient provider.    Patient's mom has apparently found a potential home for patient. Writer will follow up.     Discharge Plan or Goal:  Home with family friends  Psychiatry  Therapy     Barriers to Discharge:  Ongoing depression/SI     Referral Status:  None today     Legal Status:  Voluntary     Contacts:  Psychiatrist: Jazmin JASON  [facility] -[address] [place] [address] , [place] [address] [phone]     Primary Physician: [facility]     Therapist: Naomi Schoenberg @ [facility] - [facility] [facility] , [place] [address] [phone]     [facility] : Elbert Heard: [phone]  [facility] ([facility])     Upcoming Meetings and Dates/Important Information and next steps:  Treatment update  Wed

## 2024-04-29 NOTE — PLAN OF CARE
[facility] Occupational Therapy Group Intervention Note     04/29/24 1219   Group Therapy Session   Group Attendance attended group session   Time Session Began 1015   Time Session Ended 1200   Total Time (minutes) 75   Total # Attendees 6   Group Type task skill;life skill;psychoeducation   Group Topic Covered coping skills/lifestyle management;relapse prevention   Group Session Detail General health and coping activity: Collage focused on insight development r/t personal development, supports, protective factors, goals, and coping skills related to MH recovery.    Patient Response/Contribution cooperative with task;discussed personal experience with topic;organized;listened actively   Patient Participation Detail Congruent affect. Able to organize and apply concepts to collage, sequence activity, and clean-up. Demonstrated adequate sustained attention and organization throughout entire task. Pt identified activities and strategies including: biking, positive affirmations and images as supportive to their recovery.      Mitra Buckner OT on 4/29/2024 at 12:26 PM

## 2024-04-30 PROCEDURE — 250N000009 HC RX 250: Performed by: PHYSICIAN ASSISTANT

## 2024-04-30 PROCEDURE — 124N000002 HC R&B MH UMMC

## 2024-04-30 PROCEDURE — 250N000013 HC RX MED GY IP 250 OP 250 PS 637

## 2024-04-30 PROCEDURE — 99232 SBSQ HOSP IP/OBS MODERATE 35: CPT | Mod: GC | Performed by: PSYCHIATRY & NEUROLOGY

## 2024-04-30 RX ADMIN — Medication 7.5 MG: at 21:42

## 2024-04-30 RX ADMIN — ERYTHROMYCIN 1 G: 5 OINTMENT OPHTHALMIC at 17:31

## 2024-04-30 RX ADMIN — ERYTHROMYCIN 1 G: 5 OINTMENT OPHTHALMIC at 21:43

## 2024-04-30 RX ADMIN — PRAZOSIN HYDROCHLORIDE 2 MG: 2 CAPSULE ORAL at 21:43

## 2024-04-30 RX ADMIN — ERYTHROMYCIN 1 G: 5 OINTMENT OPHTHALMIC at 08:23

## 2024-04-30 RX ADMIN — ERYTHROMYCIN 1 G: 5 OINTMENT OPHTHALMIC at 12:29

## 2024-04-30 ASSESSMENT — ACTIVITIES OF DAILY LIVING (ADL)
ADLS_ACUITY_SCORE: 43

## 2024-04-30 NOTE — PLAN OF CARE
Problem: Anxiety  Goal: Anxiety Reduction or Resolution  Outcome: Progressing  Intervention: Promote Anxiety Reduction  Recent Flowsheet Documentation  Taken 4/30/2024 1800 by Mauricio George RN  Supportive Measures:   positive reinforcement provided   decision-making supported   verbalization of feelings encouraged   self-responsibility promoted   self-care encouraged  Family/Support System Care:   self-care encouraged   presence promoted     Problem: Psychotic Signs/Symptoms  Goal: Improved Behavioral Control (Psychotic Signs/Symptoms)  Outcome: Progressing   Goal Outcome Evaluation:    Plan of Care Reviewed With: patient      Affect was much brighter and mood was much pleasant this shift. He spent this shift in the [facility] [place] area socializing and watching movies with peers. He continue to deny mental health symptoms and did willingly contract for safety. His appetite is good and and he is medications compliant. No psychotic behavior has been observed  so far and patient is alert and oriented x4.

## 2024-04-30 NOTE — PLAN OF CARE
"Pt is intermittently visible in the milieu. Nutrition and hydration is adequate. Consumes 100% of his meals.  He presented with a flat affect, but warms up on approach.  Endorsed anxiety and depression both at 7/10 in the morning. Offered PRN but pt declined . Pt said that as the day went by, anxiety and depression went down to 5/10.  Pt denied any hallucinations. Endorsed to having SI thoughts, no plan and contracts for safety. Pt did not join groups this shift. He was observed to have made a few phone calls this shift.  Intermittently sits by the [facility] area and watches TV. Pt isolative to  self. II his room he plays with cards or does some journalling. Pt was minimally social with peers.     Problem: Adult Behavioral Health Plan of Care  Goal: Patient-Specific Goal (Individualization)  Description: You can add care plan individualizations to a care plan. Examples of Individualization might be:  \"Parent requests to be called daily at 9am for status\", \"I have a hard time hearing out of my right ear\", or \"Do not touch me to wake me up as it startles  me\".  Outcome: Progressing  Goal: Adheres to Safety Considerations for Self and Others  Outcome: Progressing  Intervention: Develop and Maintain Individualized Safety Plan  Recent Flowsheet Documentation  Taken 4/30/2024 1300 by Amber Zheng RN  Safety Measures:   environmental rounds completed   suicide check-in completed  Goal: Absence of New-Onset Illness or Injury  Outcome: Progressing  Intervention: Identify and Manage Fall Risk  Recent Flowsheet Documentation  Taken 4/30/2024 1300 by Amber Zheng, RN  Safety Measures:   environmental rounds completed   suicide check-in completed  Goal: Optimized Coping Skills in Response to Life Stressors  Outcome: Progressing  Intervention: Promote Effective Coping Strategies  Recent Flowsheet Documentation  Taken 4/30/2024 1300 by Amber Zheng, RN  Supportive Measures:   positive " reinforcement provided   decision-making supported   verbalization of feelings encouraged   self-responsibility promoted   self-care encouraged     Problem: Suicide Risk  Goal: Absence of Self-Harm  Outcome: Progressing  Intervention: Assess Risk to Self and Maintain Safety  Recent Flowsheet Documentation  Taken 4/30/2024 1300 by Amber Zheng RN  Self-Harm Prevention: environmental self-harm risks assessed  Intervention: Promote Psychosocial Wellbeing  Recent Flowsheet Documentation  Taken 4/30/2024 1300 by Amber Zheng RN  Supportive Measures:   positive reinforcement provided   decision-making supported   verbalization of feelings encouraged   self-responsibility promoted   self-care encouraged  Family/Support System Care:   self-care encouraged   presence promoted  Intervention: Establish Safety Plan and Continuity of Care  Recent Flowsheet Documentation  Taken 4/30/2024 1300 by Amber Zheng RN  Safe Transition Promotion: protective factors promoted     Problem: Depression  Goal: Improved Mood  Outcome: Progressing  Intervention: Monitor and Manage Depressive Symptoms  Recent Flowsheet Documentation  Taken 4/30/2024 1300 by Amber Zheng RN  Supportive Measures:   positive reinforcement provided   decision-making supported   verbalization of feelings encouraged   self-responsibility promoted   self-care encouraged  Family/Support System Care:   self-care encouraged   presence promoted     Problem: Anxiety  Goal: Anxiety Reduction or Resolution  Outcome: Progressing  Intervention: Promote Anxiety Reduction  Recent Flowsheet Documentation  Taken 4/30/2024 1300 by Amber Zheng RN  Supportive Measures:   positive reinforcement provided   decision-making supported   verbalization of feelings encouraged   self-responsibility promoted   self-care encouraged  Family/Support System Care:   self-care encouraged   presence promoted     Problem: Suicidal  Behavior  Goal: Suicidal Behavior is Absent or Managed  Outcome: Progressing  Intervention: Provide Immediate and Ongoing Protective Physical Environment  Recent Flowsheet Documentation  Taken 4/30/2024 1300 by Amber Zheng RN  Safe Transition Promotion: protective factors promoted     Problem: Sleep Disturbance  Goal: Adequate Sleep/Rest  Outcome: Progressing     Problem: Psychotic Signs/Symptoms  Goal: Improved Behavioral Control (Psychotic Signs/Symptoms)  Outcome: Progressing   Goal Outcome Evaluation:    Plan of Care Reviewed With: patient

## 2024-04-30 NOTE — PLAN OF CARE
Problem: Sleep Disturbance  Goal: Adequate Sleep/Rest  4/30/2024 0640 by Romeo Latif RN  Outcome: Progressing   Goal Outcome Evaluation:  Patient was observed to have slept for 7 hours. No acute events during the night. No complaints of pain. No requests for prn's.. safety checks in place q 15 minutes.

## 2024-04-30 NOTE — PROGRESS NOTES
"  ----------------------------------------------------------------------------------------------------------  [facility]  Psychiatry Progress Note  Hospital Day #7     Interim History:     The patient's care was discussed with the treatment team and chart notes were reviewed.    Vitals: Hypertensive to 140s, otherwise VSS  Sleep: 7 hours (04/30/24 0600)  Scheduled medications: Took all scheduled medications as prescribed  Psychiatric PRN medications:   Last 24H PRN:     hydrOXYzine HCl (ATARAX) tablet 25 mg, 25 mg at 04/29/24 1010    ondansetron (ZOFRAN) tablet 4 mg, 4 mg at 04/29/24 1800        Staff Report:   Endorsed SI but agreed to contract for safe behavior.    Depression = 7/10  Anxiety = 7/10; PRN hydroxyzine was given.    Affect was flat and mood was calm.    Pt changed clothes but declined a shower. He was seen in the milieu playing card with peers and attending group. After his meeting he said he was feeling \"better\" but just wanted to sleep.    Slept for 7 hours. No acute events during the night.     Please see staff results for further details.    Parents, CM, 2  staff members: arranged by Mom (but no  waiver).   Subjective:     Patient Interview:    Today patient is seen in the conference room by the team and when asked how asked how he is feeling he reports he is \"hanging in there.\" Questions about patient's ongoing SI + plan, SIB (both associated with tumescence) reveals this is \"not much different\" today.    Further qurstions reveals increase dose of \"abilify helped a little bit\" and that SI & SIB  is \"still there\" but less frequent vs yesterday.    Patient today denies any HI/VH/AH. He rates both anxiety & mood today = 6/10.    Upon inquiry regarding side-effects from increased Abilify dose, patient does endorse \"a little bit of sedation\" but, on balance, thinks this is manageable.    Patient is informed of the teams plan to increase his dose of " "Prozac to 30 mg, and to re-schedule this for AM dosing - to which the patient agrees.    Patient reports he was comfortable during yesterday's meeting with parents & GH staff.    When asking whether patient feels ok with staying on the unit until his insurance is sorted out -- to enable suitable discharge planning, patient states \"maybe...but [he is] anxious to get out.\"    Patient does state he is thinks a potential temporary alternative could involve \"possibly\" living with parents in the meantime, until insurance is sorted.    Patient is validated for meeting his goals by attending 2 groups yesterday and has no other questions or concerns to relay at this time.    Phone call with therapist, Mindy, 4/25  Mindy reports that she began seeing Pj August 2023, albeit with a lapse of time since due to financial issues. She reports his intrusive thoughts have been consistent, with thoughts of suicide but with no prior action or plan. Did endorse thoughts generally about jumping off of a bridge. She developed a safety plan with him, which he did follow over the weekend leading to his admission. She met with him on Monday. Endorses a lot of stressors for him right now that may be contributing to his presentation/worsening SI. They had recently been working on relationships and how to connect with others. She believes he would benefit from increased structure in his days and more supports around him. Hoping MA goes through so that they can meet twice weekly.     ROS:  Negative unless stated above     Objective:     Vitals:  /88 (BP Location: Right arm)   Pulse 101   Temp 97.8  F (36.6  C) (Oral)   Resp 20   Ht 1.727 m (5' 8\")   Wt 111.9 kg (246 lb 9.6 oz)   SpO2 95%   BMI 37.50 kg/m      Allergies:  No Known Allergies    Current Medications:  Scheduled:  Current Facility-Administered Medications   Medication Dose Route Frequency Provider Last Rate Last Admin    acetaminophen (TYLENOL) tablet 325 mg  325 mg " Oral Q4H PRN Tyrese Moe MD        ARIPiprazole (ABILIFY) half-tab 7.5 mg  7.5 mg Oral At Bedtime Rojelio Baumann MD   7.5 mg at 04/29/24 2100    erythromycin (ROMYCIN) ophthalmic ointment   Both Eyes 4x Daily Ursula Strange PA-C   1 g at 04/30/24 0823    FLUoxetine (PROzac) capsule 20 mg  20 mg Oral At Bedtime Tyrese Moe MD   20 mg at 04/29/24 2100    hydrOXYzine HCl (ATARAX) tablet 25 mg  25 mg Oral BID PRN Tyrese Moe MD   25 mg at 04/29/24 1010    OLANZapine (zyPREXA) tablet 5 mg  5 mg Oral TID PRN Tyrese Moe MD        Or    OLANZapine (zyPREXA) injection 5 mg  5 mg Intramuscular TID PRN Tyrese Moe MD        ondansetron (ZOFRAN) tablet 4 mg  4 mg Oral Q6H PRN Lydia Dhaliwal MD   4 mg at 04/29/24 1800    polyethylene glycol (MIRALAX) Packet 17 g  17 g Oral Daily PRN Tyrese Moe MD        polyethylene glycol-propylene glycol PF (SYSTANE ULTRA PF) opthalmic solution 1 drop  1 drop Both Eyes Q1H PRN Johnny Jay MD   1 drop at 04/28/24 1702    prazosin (MINIPRESS) capsule 2 mg  2 mg Oral At Bedtime Tyrese Moe MD   2 mg at 04/29/24 2100       PRN:  Current Facility-Administered Medications   Medication Dose Route Frequency Provider Last Rate Last Admin    acetaminophen (TYLENOL) tablet 325 mg  325 mg Oral Q4H PRN Tyrese Moe MD        ARIPiprazole (ABILIFY) half-tab 7.5 mg  7.5 mg Oral At Bedtime Rojelio Baumann MD   7.5 mg at 04/29/24 2100    erythromycin (ROMYCIN) ophthalmic ointment   Both Eyes 4x Daily Ursula Strange PA-C   1 g at 04/30/24 0823    FLUoxetine (PROzac) capsule 20 mg  20 mg Oral At Bedtime Tyrese Moe MD   20 mg at 04/29/24 2100    hydrOXYzine HCl (ATARAX) tablet 25 mg  25 mg Oral BID PRN Tyrese Moe MD   25 mg at 04/29/24 1010    OLANZapine (zyPREXA) tablet 5 mg  5 mg Oral TID PRN Tyrese Moe MD        Or    OLANZapine (zyPREXA) injection 5 mg  5 mg Intramuscular TID PRN Abhi,  "MD Tyrese        ondansetron (ZOFRAN) tablet 4 mg  4 mg Oral Q6H PRN Lydia Dhaliwal MD   4 mg at 04/29/24 1800    polyethylene glycol (MIRALAX) Packet 17 g  17 g Oral Daily PRN Tyrese Moe MD        polyethylene glycol-propylene glycol PF (SYSTANE ULTRA PF) opthalmic solution 1 drop  1 drop Both Eyes Q1H PRN Johnny Jay MD   1 drop at 04/28/24 1702    prazosin (MINIPRESS) capsule 2 mg  2 mg Oral At Bedtime Tyrese Moe MD   2 mg at 04/29/24 2100       Labs and Imaging:  New results:   No results found for this or any previous visit (from the past 24 hour(s)).      Data this admission:  - CBC unremarkable  - CMP unremarkable  - TSH normal  - UDS negative  - Hgb A1c normal, 5.3  - Lipids remarkable for low HDL cholesterol, high LDL cholesterol  - EKG normal sinus rhythm, QTc 427     Mental Status Exam:     Oriented to:  Grossly Oriented  General:  Awake and Alert  Appearance:  appears stated age, overweight, and phenotype consistent with velocardiofacial syndrome  Behavior/Attitude:  Calm, Cooperative, and Engaged  Eye Contact: Appropriate  Psychomotor: Normal and No evidence of tics, dystonia, or tardive dyskinesia  no catatonia present  Speech:  appropriate volume/tone, paucity, and hesitant  Language: Fluent in English with appropriate syntax and vocabulary.  Mood:  \"no change\"  Affect:  appropriate, congruent with mood, restricted, and anxious  Thought Process:  linear and coherent  Thought Content:    SIB involving cutting self with knife, suicidal ideation with plan (without intent), No HI, does not appear to be responding to internal stimuli, No VH, and No AH; No apparent delusions  Associations:  intact  Insight:  fair due to understanding of condition, but limited 2/2 unclear about goals for admission  Judgment:  fair due to voluntary presentation  Impulse control: partial  Attention Span:  grossly intact and adequate for conversation  Concentration:  grossly intact  Recent and Remote " Memory:  not formally assessed  Fund of Knowledge: estimated below average  Muscle Strength and Tone:  not formally assessed  Gait and Station: Normal     Psychiatric Assessment     Pj Tucker is a 29 year old male previously diagnosed with MDD, ADHD, autism spectrum disorder who presented voluntarily by himself with suicidal  in the context of increased psychosocial stressors, medication non-adherence. No prior psychiatric hospitalizations. Significant symptoms on admission include suicidal ideation with plan, increased impulsivity. The MSE on admission was pertinent for dysphoric but reactive affect, linear and goal directed in thought. Biological contributions to mental health presentation include diagnoses of autism spectrum disorder, medication non-adherence. Psychological contributions to mental health presentation include diagnoses of ADHD, MDD as well as insight, coping, personality.  Social factors contributing to mental health presentation include financial stressors, communication challenges. Protective factors include willingness to engage in treatment, lack of prior suicide attempts/SIB.      In summary, the patient's reported symptoms of suicidal ideation in the context of recent psychosocial stressors are consistent with depressive disorder, most likely major depressive episode. Differentials include unspecified depressive disorder, adjustment disorder, intrusive thoughts 2/2 OCD.  He will likely benefit from medication management, stabilization and observation this admission.     Given that he currently has SI, patient warrants inpatient psychiatric hospitalization to maintain his safety.      Psychiatric Plan by Diagnosis      Today's changes:       # Suicidal Ideation with Plan (likely without Intent)  # SIB with Plan (likely without intent)  2/2  # MDD, decompensated  1. Medications:  - ABILIFY 7.5 mg PO qHS  - PROZAC 30 mg PO qAM  - PRAZOSIN 2 mg PO at bedtime  - ATARAX 25 mg PO TID PRN (for  anxiety)     2. Pertinent Labs/Monitoring:   - Qtc 427 ms     3. Additional Plans:  - Patient will be treated in therapeutic milieu with appropriate individual and group therapies as described    # ASD  # ADHD       Psychiatric Hospital Course:      Pj Tucker was admitted to [facility] as a voluntary patient.  Medications:  PTA Abilify, Prozac, prazosin, and hydroxyzine were continued:    ABILIFY  04/29: increase dose to 7.5 mg PO at bedtime (up from 5 mg PO)  PROZAC  04/30: increase dose to 30 mg PO & re-scheduled to qA (in lieu of 20 mg PO at bedtime)    PTA Lamictal was discontinued 4/25 due to status decline since initiation 1-2 weeks ago.  No new medications started at the time of admission.     The risks, benefits, alternatives, and side effects were discussed and understood by the patient.     Medical Assessment and Plan     Medical diagnoses to be addressed this admission:  N/A    Medical course: Patient was physically examined by the ED prior to being transferred to the unit and was found to be medically stable and appropriate for admission.     Consults: medicine     Checklist     Legal Status: Voluntary     Safety Assessment:   Behavioral Orders   Procedures    Code 1 - Restrict to Unit    Routine Programming     As clinically indicated    Self Injury Precaution    Status 15     Every 15 minutes.    Suicide precautions: Suicide Risk: MODERATE; Clinical rationale to override score: modification to the care environment, lack of access to a plan for self-harm     Patients on Suicide Precautions should have a Combination Diet ordered that includes a Diet selection(s) AND a Behavioral Tray selection for Safe Tray - with utensils, or Safe Tray - NO utensils       Order Specific Question:   Suicide Risk     Answer:   MODERATE     Order Specific Question:   Clinical rationale to override score:     Answer:   modification to the care environment     Order Specific Question:   Clinical rationale to override  score:     Answer:   lack of access to a plan for self-harm       Risk Assessment:  Risk for harm is elevated.  Risk factors: SI, impulsive, and psychosocial stressors  Protective factors: engaged in treatment     SIO: none    Disposition: Pending stabilization, medication optimization, & development of a safe discharge plan.     Attestations   Rojelio Baumann MD  Psychiatry Resident  PGY1    Attestation:  This patient has been seen and evaluated by me, Linette Bello MD.  I have discussed this patient with the house staff team including the resident and medical student and I agree with the findings and plan in this note.    I have reviewed today's vital signs, medications, labs and imaging. Linette Bello MD , PhD.

## 2024-04-30 NOTE — PLAN OF CARE
BEH IP Unit Acuity Rating Score (UARS)  Patient is given one point for every criteria they meet.     CRITERIA SCORING   On a 72 hour hold, court hold, committed, stay of commitment, or revocation. 0    Patient LOS on BEH unit exceeds 20 days. 0  LOS: 7   Patient under guardianship, 55+, otherwise medically complex, or under age 11. 0   Suicide ideation without relief of precipitating factors. 1   Current plan for suicide. 0   Current plan for homicide. 0   Imminent risk or actual attempt to seriously harm another without relief of factors precipitating the attempt. 1   Severe dysfunction in daily living (ex: complete neglect for self care, extreme disruption in vegetative function, extreme deterioration in social interactions). 1   Recent (last 7 days) or current physical aggression in the ED or on unit. 0   Restraints or seclusion episode in past 72 hours. 0   Recent (last 7 days) or current verbal aggression, agitation, yelling, etc., while in the ED or unit. 0   Active psychosis. 0   Need for constant or near constant redirection (from leaving, from others, etc).  0   Intrusive or disruptive behaviors. 0   Patient requires 3 or more hours of individualized nursing care per 8-hour shift (i.e. for ADLs, meds, therapeutic interventions). 0   TOTAL 3

## 2024-04-30 NOTE — PLAN OF CARE
Problem: Depression  Goal: Improved Mood  Outcome: Progressing  Intervention: Monitor and Manage Depressive Symptoms  Recent Flowsheet Documentation  Taken 4/29/2024 1900 by Mauricio George RN  Supportive Measures:   positive reinforcement provided   decision-making supported   verbalization of feelings encouraged   self-responsibility promoted   self-care encouraged   relaxation techniques promoted  Family/Support System Care:   self-care encouraged   presence promoted     Problem: Psychotic Signs/Symptoms  Goal: Improved Behavioral Control (Psychotic Signs/Symptoms)  Outcome: Progressing   Goal Outcome Evaluation:    Plan of Care Reviewed With: patient      Affect was flat and mood was calm. Patient spent most of the shift in the milieu socialized with peers  and watching TV. He denied all mental health symptoms and contracted for safety. No safety concerns were noted and patient was medication compliant. No psychotic behavior was observed. Will continue to encourage

## 2024-04-30 NOTE — PLAN OF CARE
Assessment/Intervention/Current Symtoms and Care Coordination:  Chart reviewed and patient met with team,   Discussed patient progress, symptomology, and response to treatment.  Discussed the discharge plan and any potential impediments to discharge.     Patient met with team. He continues to report no improvement in mood/anxiety as yet.    Affect remains flat. Smiles s/w inappropriately at times.    Meds continue to be reviewed/adjusted per MD's.  Patient agreeable to changes. MD's trying to reach outpatient provider.    Care conference held yesterday with patient's parents, [facility] CM and potential  staff.   Unfortunately, mom scheduled mtg with  staff  but patient requires CADI funding which he does not have - nor been screened by [facility] yet.  Patient has private insurance from his employer so he does not qualify re: funding for IRTS.  Patient's CM will be working with [facility] to see if he can apply for MA.  Letter has been sent to patient's employer requesting medical leave.  Per patient's parents, patient has not been managing finances and his car now has a lien on it.  Patient has apparently accumulated significant financial debt.  Writer will continue to work closely with [facility] CM to coordinate care.    Discharge Plan or Goal:  PLacement- IRTS /GH  Psychiatry  Therapy     Barriers to Discharge:  Ongoing depression/SI  Placement- patient is homeless     Referral Status:  None today     Legal Status:  Voluntary     Contacts:  Psychiatrist: Jazmin JASON  [facility] -[address] [place] [address] , [place] [address] [phone]     Primary Physician: [facility] Marie/Rsvl     Therapist: Naomi Schoenberg @ [facility] - [facility] [facility] , [place] [address] [phone]     [facility] : Elbert Heard: [phone]  [facility] ([facility])     Upcoming Meetings and Dates/Important Information and next steps:  Treatment update  Wed

## 2024-05-01 PROCEDURE — 124N000002 HC R&B MH UMMC

## 2024-05-01 PROCEDURE — 250N000013 HC RX MED GY IP 250 OP 250 PS 637

## 2024-05-01 PROCEDURE — 99232 SBSQ HOSP IP/OBS MODERATE 35: CPT | Mod: GC | Performed by: STUDENT IN AN ORGANIZED HEALTH CARE EDUCATION/TRAINING PROGRAM

## 2024-05-01 PROCEDURE — 250N000009 HC RX 250: Performed by: PHYSICIAN ASSISTANT

## 2024-05-01 RX ADMIN — PRAZOSIN HYDROCHLORIDE 2 MG: 2 CAPSULE ORAL at 20:36

## 2024-05-01 RX ADMIN — ERYTHROMYCIN 1 G: 5 OINTMENT OPHTHALMIC at 20:36

## 2024-05-01 RX ADMIN — FLUOXETINE HYDROCHLORIDE 30 MG: 10 CAPSULE ORAL at 08:18

## 2024-05-01 RX ADMIN — HYDROXYZINE HYDROCHLORIDE 25 MG: 25 TABLET, FILM COATED ORAL at 13:39

## 2024-05-01 RX ADMIN — ERYTHROMYCIN 1 G: 5 OINTMENT OPHTHALMIC at 16:21

## 2024-05-01 RX ADMIN — Medication 7.5 MG: at 20:36

## 2024-05-01 RX ADMIN — ERYTHROMYCIN 1 G: 5 OINTMENT OPHTHALMIC at 13:14

## 2024-05-01 RX ADMIN — ERYTHROMYCIN 1 G: 5 OINTMENT OPHTHALMIC at 08:18

## 2024-05-01 ASSESSMENT — ACTIVITIES OF DAILY LIVING (ADL)
ADLS_ACUITY_SCORE: 43
DRESS: INDEPENDENT
ADLS_ACUITY_SCORE: 43
HYGIENE/GROOMING: HANDWASHING;INDEPENDENT
ADLS_ACUITY_SCORE: 43
ORAL_HYGIENE: INDEPENDENT
ADLS_ACUITY_SCORE: 43

## 2024-05-01 NOTE — PLAN OF CARE
Problem: Suicidal Behavior  Goal: Suicidal Behavior is Absent or Managed  Outcome: Progressing     Problem: Depression  Goal: Improved Mood  Outcome: Progressing     Problem: Suicide Risk  Goal: Absence of Self-Harm  Outcome: Progressing   Goal Outcome Evaluation:    Plan of Care Reviewed With: patient        Patient endorsed passive suicidal thought, he contracted for safety. Anxiety and depression rated at 7. Alert and oriented able to communicate needs. Intermittently visible in milieu for smacks and meal, appeared flat with minimal interactions while in milieu. Patient was cooperative and medication compliant. He denied any pain or discomfort, ADLS WNL. He had adequate food and fluid intake.

## 2024-05-01 NOTE — PROVIDER NOTIFICATION
05/01/24 1214   Individualization/Patient Specific Goals   Patient Personal Strengths community support;expressive of emotions;family/social support;independent living skills;interests/hobbies;medication/treatment adherence;motivated for treatment;positive vocational history;resilient   Patient Vulnerabilities adverse childhood experience(s);limited social skills   Anxieties, Fears or Concerns Where he will go following dc   Individualized Care Needs None   Interprofessional Rounds   Summary 1. Evaluation/stabilization of mood disorder sx's 2. Safe with self- absence of SI 3. Medication mgmt per MD's 4. Coordination of care with outpatient provers 5. Housing in place 6. Psychiatric f/u care in place   Participants CTC;nursing;patient;psychiatrist   Behavioral Team Discussion   Participants Dr. Jay, Amber Zheng RN, Dr. Ogden, Jazmin Khoury MA.LP   Progress Patient has not attended groups   Anticipated length of stay 5-7 days   Continued Stay Criteria/Rationale Worsening depression/SI, placement and insurance issues   Medical/Physical Stable   Precautions Per unit protocol   Plan Patient willcontinue to be seen by Psychiatry daily.  Meds will be reviewed/adjusted per MD's as indicated.  Outpatient providers/family will be contacted for care coordination.  Patient  needing housing.  CTC will continue to assess needs, ensure appropriate follow up care is in place   Rationale for change in precautions or plan No change in plan/precautions   Safety Plan Patient to complete     Taken   Anticipated Discharge Disposition home with family;group home;IRTS     Goal Outcome Evaluation:

## 2024-05-01 NOTE — PLAN OF CARE
Problem: Sleep Disturbance  Goal: Adequate Sleep/Rest  Outcome: Progressing   Goal Outcome Evaluation:  Patient appears to have slept for 7 hours. No complaints of pain or requests for prn's during the shift. Safety checks in place q 15 minutes. No behavioral issues.

## 2024-05-01 NOTE — PLAN OF CARE
Individual Therapy Note    Session duration in minutes: 10       Mental Status Exam:   Attitude: guarded  Eye Contact: fair  Mood: anxious and depressed  Affect: intensity is blunted  Speech: clear, coherent  Psychomotor Behavior: no evidence of tardive dyskinesia, dystonia, or tics  Thought Process:  logical and linear  Associations: no loose associations  Thought Content: passive suicidal ideation present  Insight: fair  Judgement: limited  Attention Span and Concentration: limited    Pt progress: Pt was fairly guarded and offered one word responses during session. Pt did report he continues to feel anxious, depressed, hopeless, tired and endorses SI. Pt shared housing and finances are his top concerns, states he would feel better if he had a place to go after this. Writer validated his concerns and attempted to discuss coping skills; Pt not receptive to suggestions or having discussion. Pt stated he is not in the mood to talk and feels tired d/t medication; made plan to check in again later this week.        No Charge (0-15 min)

## 2024-05-01 NOTE — PLAN OF CARE
"  Pt is intermittently visible in the milieu. Medication compliant, eating and drinking well. Pt presents with a flat affect but warms up on approach. Pt endorsed anxiety at 7/10, PRN offered and accepted. Depression was at 7/10 as well, denies any hallucinations, endorses SI, but cannot answer/describe it,  if it is fleeting or passive, response was  \" I don't know\". Pt was offered to attend groups and he declined. After breakfast, he had a short card games with set peers, after which he went back to his room and slept. Pt came out for lunch, ate 100% of his meal. Pt sat for a while [place] [facility] and [place]. He and his roommate then started card game between themselves, joined in by other peers later and had been enjoying the game until reflection time. Pt stayed out in the [facility] area with peers.       Problem: Adult Behavioral Health Plan of Care  Goal: Optimized Coping Skills in Response to Life Stressors  Outcome: Not Progressing  Intervention: Promote Effective Coping Strategies  Recent Flowsheet Documentation  Taken 5/1/2024 1300 by Amber Zheng RN  Supportive Measures:   positive reinforcement provided   decision-making supported   verbalization of feelings encouraged   self-responsibility promoted   self-care encouraged   Goal Outcome Evaluation:    Plan of Care Reviewed With: patient                   "

## 2024-05-01 NOTE — PLAN OF CARE
Assessment/Intervention/Current Symtoms and Care Coordination:  Chart reviewed and patient met with team,   Discussed patient progress, symptomology, and response to treatment.  Discussed the discharge plan and any potential impediments to discharge.     Patient met with team. Mood appears to be improving- affect brighter.  He has been more social on the unit and I suspect other patient's offering to play games, social with him has helped.    Meds continue to be reviewed/adjusted per MD's.  Patient agreeable to changes.     Awaiting work from patient's CM for update on MA application at the [facility].  Emailed CM that patient will not be able to remain in the hospital for the likley amount of time it will take for him to qualify and have active MA for IRTS placement. Suggested that we look int other options while he awaits MA.  Awaiting word back.Patient may need to stay with parents- unsure if this is an option- parents current out of town  Writer will continue to work closely with [facility] CM to coordinate care.    Discharge Plan or Goal:  PLacement- IRTS /[facility]  Psychiatry  Therapy     Barriers to Discharge:  Ongoing depression/SI  Placement- patient is homeless     Referral Status:  None today     Legal Status:  Voluntary     Contacts:  Psychiatrist: Jazmin JASON  [facility] -[address] [place] [address] , [place] [address] [phone]     Primary Physician: [facility] Marie/Rsvl     Therapist: Naomi Schoenberg @ [facility] - [address] [place] [address] , [place] [address] [phone]     [facility] : Elbert Heard: [phone]  [facility] ([facility])     Upcoming Meetings and Dates/Important Information and next steps:  Treatment update  Wed

## 2024-05-02 PROCEDURE — 99232 SBSQ HOSP IP/OBS MODERATE 35: CPT | Mod: GC | Performed by: STUDENT IN AN ORGANIZED HEALTH CARE EDUCATION/TRAINING PROGRAM

## 2024-05-02 PROCEDURE — 250N000009 HC RX 250: Performed by: PHYSICIAN ASSISTANT

## 2024-05-02 PROCEDURE — 124N000002 HC R&B MH UMMC

## 2024-05-02 PROCEDURE — 250N000013 HC RX MED GY IP 250 OP 250 PS 637

## 2024-05-02 RX ADMIN — PRAZOSIN HYDROCHLORIDE 2 MG: 2 CAPSULE ORAL at 20:49

## 2024-05-02 RX ADMIN — ERYTHROMYCIN 1 G: 5 OINTMENT OPHTHALMIC at 09:01

## 2024-05-02 RX ADMIN — HYDROXYZINE HYDROCHLORIDE 25 MG: 25 TABLET, FILM COATED ORAL at 10:13

## 2024-05-02 RX ADMIN — Medication 7.5 MG: at 20:49

## 2024-05-02 RX ADMIN — FLUOXETINE HYDROCHLORIDE 30 MG: 10 CAPSULE ORAL at 08:59

## 2024-05-02 RX ADMIN — ERYTHROMYCIN 1 G: 5 OINTMENT OPHTHALMIC at 12:31

## 2024-05-02 ASSESSMENT — ACTIVITIES OF DAILY LIVING (ADL)
ADLS_ACUITY_SCORE: 43
ORAL_HYGIENE: INDEPENDENT;PROMPTS
ORAL_HYGIENE: INDEPENDENT
HYGIENE/GROOMING: INDEPENDENT
ADLS_ACUITY_SCORE: 43
DRESS: INDEPENDENT
LAUNDRY: UNABLE TO COMPLETE
ADLS_ACUITY_SCORE: 43
DRESS: SCRUBS (BEHAVIORAL HEALTH);INDEPENDENT
HYGIENE/GROOMING: HANDWASHING;SHOWER;INDEPENDENT
ADLS_ACUITY_SCORE: 43

## 2024-05-02 NOTE — PLAN OF CARE
Assessment/Intervention/Current Symtoms and Care Coordination:  Chart reviewed and patient met with team,   Discussed patient progress, symptomology, and response to treatment.  Discussed the discharge plan and any potential impediments to discharge.     Patient met with Carolyn GAMEZ this AM - working on emergency SMRT application.  Carolyn GAMEZ is working with [facility] trying to have this expedited.  Patient can not stay with his parents therefore IRTS will be the only discharge option.  Meds continue to be reviewed/adjusted per MD's.      Writer will continue to work closely with Carolyn GAMEZ to coordinate care.     Discharge Plan or Goal:  PLacement- IRTS /[facility]  Psychiatry  Therapy     Barriers to Discharge:  Ongoing depression/SI  Placement- patient is homeless     Referral Status:  None today     Legal Status:  Voluntary     Contacts:  Psychiatrist: Jazmin JASON  -[facility] -[address] [place] [address] , [place] [address] [phone]     Primary Physician: [facility] Marie/Rsvl     Therapist: Naomi Schoenberg @ [facility] - [facility], [place] [address] [phone]     [facility] : Elbert Heard: [phone]  [facility] ([facility])     Upcoming Meetings and Dates/Important Information and next steps:  Treatment update  Wed

## 2024-05-02 NOTE — PLAN OF CARE
"  Problem: Sleep Disturbance  Goal: Adequate Sleep/Rest  Outcome: Progressing   Goal Outcome Evaluation:    Pt has been sleeping for about 7 hours this shift. No reported pain issues, and no PRN administered. No safety concern noted this shift. Continue POC.    Blood pressure 127/84, pulse 76, temperature 97.1  F (36.2  C), temperature source Temporal, resp. rate 18, height 1.727 m (5' 8\"), weight 111.9 kg (246 lb 9.6 oz), SpO2 98%.   "

## 2024-05-02 NOTE — PLAN OF CARE
BEH IP Unit Acuity Rating Score (UARS)  Patient is given one point for every criteria they meet.     CRITERIA SCORING   On a 72 hour hold, court hold, committed, stay of commitment, or revocation. 0    Patient LOS on BEH unit exceeds 20 days. 0  LOS: 9   Patient under guardianship, 55+, otherwise medically complex, or under age 11. 0   Suicide ideation without relief of precipitating factors. 1   Current plan for suicide. 0   Current plan for homicide. 0   Imminent risk or actual attempt to seriously harm another without relief of factors precipitating the attempt. 1   Severe dysfunction in daily living (ex: complete neglect for self care, extreme disruption in vegetative function, extreme deterioration in social interactions). 1   Recent (last 7 days) or current physical aggression in the ED or on unit. 0   Restraints or seclusion episode in past 72 hours. 0   Recent (last 7 days) or current verbal aggression, agitation, yelling, etc., while in the ED or unit. 0   Active psychosis. 0   Need for constant or near constant redirection (from leaving, from others, etc).  0   Intrusive or disruptive behaviors. 0   Patient requires 3 or more hours of individualized nursing care per 8-hour shift (i.e. for ADLs, meds, therapeutic interventions). 0   TOTAL 3

## 2024-05-02 NOTE — PROGRESS NOTES
"  ----------------------------------------------------------------------------------------------------------  [facility]  Psychiatry Progress Note  Hospital Day #9     Interim History:     The patient's care was discussed with the treatment team and chart notes were reviewed.    Vitals: Hypertensive to 140s, otherwise VSS  Sleep: 7 hours (05/02/24 0641)  Scheduled medications: Took all scheduled medications as prescribed  Psychiatric PRN medications:   Last 24H PRN:     hydrOXYzine HCl (ATARAX) tablet 25 mg, 25 mg at 05/01/24 1339      Staff Report: No acute events overnight or safety concerns noted.  Cooperative with cares and withdrawn.  See staff note for additional details.     Subjective:     Patient Interview:    Patient was seen and evaluated in the treatment room with team.  He expresses some anxiety given the uncertainty of his housing situation.  He finds that group is sometimes helpful in that he tends to only go to the once that are of interest to him.  Writer encouraged him to attend more groups to help distract him from his stressful thoughts.  He denies any adverse effects with the medication besides some tiredness.  He expressed that he is willing to stay on the medication to see if these affects go away after the first week.  He reports that his eyes are feeling better after receiving eyedrops.  No further concerns expressed.    ROS:  Negative unless stated above     Objective:     Vitals:  /84   Pulse 76   Temp 97.1  F (36.2  C) (Temporal)   Resp 18   Ht 1.727 m (5' 8\")   Wt 111.9 kg (246 lb 9.6 oz)   SpO2 98%   BMI 37.50 kg/m      Allergies:  No Known Allergies    Current Medications:  Scheduled:  Current Facility-Administered Medications   Medication Dose Route Frequency Provider Last Rate Last Admin    acetaminophen (TYLENOL) tablet 325 mg  325 mg Oral Q4H PRN Tyrese Moe MD        ARIPiprazole (ABILIFY) half-tab 7.5 mg  7.5 mg " Oral At Bedtime Rojelio Baumann MD   7.5 mg at 05/01/24 2036    erythromycin (ROMYCIN) ophthalmic ointment   Both Eyes 4x Daily Ursula Strange PA-C   1 g at 05/01/24 2036    FLUoxetine (PROzac) capsule 30 mg  30 mg Oral QAM Rojelio Baumann MD   30 mg at 05/01/24 0818    hydrOXYzine HCl (ATARAX) tablet 25 mg  25 mg Oral BID PRN Tyrese Moe MD   25 mg at 05/01/24 1339    OLANZapine (zyPREXA) tablet 5 mg  5 mg Oral TID PRN Tyrese Moe MD        Or    OLANZapine (zyPREXA) injection 5 mg  5 mg Intramuscular TID PRN Tyrese Moe MD        ondansetron (ZOFRAN) tablet 4 mg  4 mg Oral Q6H PRN Lydia Dhaliwal MD   4 mg at 04/29/24 1800    polyethylene glycol (MIRALAX) Packet 17 g  17 g Oral Daily PRN Tyrese Moe MD        polyethylene glycol-propylene glycol PF (SYSTANE ULTRA PF) opthalmic solution 1 drop  1 drop Both Eyes Q1H PRN Johnny Jay MD   1 drop at 04/28/24 1702    prazosin (MINIPRESS) capsule 2 mg  2 mg Oral At Bedtime Tyrese Moe MD   2 mg at 05/01/24 2036       PRN:  Current Facility-Administered Medications   Medication Dose Route Frequency Provider Last Rate Last Admin    acetaminophen (TYLENOL) tablet 325 mg  325 mg Oral Q4H PRN Tyrese Moe MD        ARIPiprazole (ABILIFY) half-tab 7.5 mg  7.5 mg Oral At Bedtime Rojelio Baumann MD   7.5 mg at 05/01/24 2036    erythromycin (ROMYCIN) ophthalmic ointment   Both Eyes 4x Daily Ursula Strange PA-C   1 g at 05/01/24 2036    FLUoxetine (PROzac) capsule 30 mg  30 mg Oral Rojelio Morton MD   30 mg at 05/01/24 0818    hydrOXYzine HCl (ATARAX) tablet 25 mg  25 mg Oral BID PRN Tyrese Moe MD   25 mg at 05/01/24 1339    OLANZapine (zyPREXA) tablet 5 mg  5 mg Oral TID PRN Tyrese Moe MD        Or    OLANZapine (zyPREXA) injection 5 mg  5 mg Intramuscular TID PRN Tyrese Moe MD        ondansetron (ZOFRAN) tablet 4 mg  4 mg Oral Q6H PRN Lydia Dhaliwal MD   4 mg at 04/29/24  "1800    polyethylene glycol (MIRALAX) Packet 17 g  17 g Oral Daily PRN Tyrese Moe MD        polyethylene glycol-propylene glycol PF (SYSTANE ULTRA PF) opthalmic solution 1 drop  1 drop Both Eyes Q1H PRN Johnny Jay MD   1 drop at 04/28/24 1702    prazosin (MINIPRESS) capsule 2 mg  2 mg Oral At Bedtime Tyrese Moe MD   2 mg at 05/01/24 2036       Labs and Imaging:  New results:   No results found for this or any previous visit (from the past 24 hour(s)).      Data this admission:  - CBC unremarkable  - CMP unremarkable  - TSH normal  - UDS negative  - Hgb A1c normal, 5.3  - Lipids remarkable for low HDL cholesterol, high LDL cholesterol  - EKG normal sinus rhythm, QTc 427     Mental Status Exam:     Oriented to:  Grossly Oriented  General:  Awake and Alert  Appearance:  appears stated age and Grooming is adequate  Behavior/Attitude:  Calm, Cooperative, and Guarded  Eye Contact: Appropriate  Psychomotor: Normal and No evidence of tics, dystonia, or tardive dyskinesia  no catatonia present  Speech:  appropriate volume/tone, paucity, and hesitant  Language: Fluent in English with appropriate syntax and vocabulary.  Mood:  \"Anxious\"  Affect:  appropriate, congruent with mood, restricted, and anxious  Thought Process:  linear and coherent  Thought Content:    SIB involving cutting self with knife, suicidal ideation with plan (without intent), No HI, does not appear to be responding to internal stimuli, No VH, and No AH; No apparent delusions  Associations:  intact  Insight:  fair due to understanding of condition, but limited 2/2 unclear about goals for admission  Judgment:  fair due to voluntary presentation  Impulse control: partial  Attention Span:  grossly intact and adequate for conversation  Concentration:  grossly intact  Recent and Remote Memory:  not formally assessed  Fund of Knowledge: estimated below average  Muscle Strength and Tone:  not formally assessed  Gait and Station: Normal     " Psychiatric Assessment     Pj Tucker is a 29 year old male previously diagnosed with MDD, ADHD, autism spectrum disorder who presented voluntarily by himself with suicidal  in the context of increased psychosocial stressors, medication non-adherence. No prior psychiatric hospitalizations. Significant symptoms on admission include suicidal ideation with plan, increased impulsivity. The MSE on admission was pertinent for dysphoric but reactive affect, linear and goal directed in thought. Biological contributions to mental health presentation include diagnoses of autism spectrum disorder, medication non-adherence. Psychological contributions to mental health presentation include diagnoses of ADHD, MDD as well as insight, coping, personality.  Social factors contributing to mental health presentation include financial stressors, communication challenges. Protective factors include willingness to engage in treatment, lack of prior suicide attempts/SIB.      In summary, the patient's reported symptoms of suicidal ideation in the context of recent psychosocial stressors are consistent with depressive disorder, most likely major depressive episode. Differentials include unspecified depressive disorder, adjustment disorder, intrusive thoughts 2/2 OCD.  He will likely benefit from medication management, stabilization and observation this admission.     Given that he currently has SI, patient warrants inpatient psychiatric hospitalization to maintain his safety.      Psychiatric Plan by Diagnosis      Today's changes:       # Suicidal Ideation with Plan   # SIB with Plan   # MDD, recurrent, severe  - ABILIFY 7.5 mg PO qHS  - PROZAC 30 mg PO qAM  - PRAZOSIN 2 mg PO at bedtime    # Unspecified anxiety disorder  - ATARAX 25 mg PO TID PRN (for anxiety)    # ASD  # ADHD  -No medications at this time     Pertinent Labs/Monitoring:   - Qtc 427 ms     Additional Plans:  - Patient will be treated in therapeutic milieu with  appropriate individual and group therapies as described       Psychiatric Hospital Course:      Pj Tucker was admitted to [facility] as a voluntary patient. PTA Abilify 5mg, Prozac 20mg, prazosin 2mg, and hydroxyzine 25mg were continued. For medication optimization, Abilify was increased to 7.5mg po to target mood stabilization effects. Prozac was increased to 30mg for medication optimization. PTA lamictal was discontinued given little benefit in symptoms control.       The risks, benefits, alternatives, and side effects were discussed and understood by the patient.     Medical Assessment and Plan     Medical diagnoses to be addressed this admission:  N/A    Medical course: Patient was physically examined by the ED prior to being transferred to the unit and was found to be medically stable and appropriate for admission.     Consults: medicine     Checklist     Legal Status: Voluntary     Safety Assessment:   Behavioral Orders   Procedures    Code 1 - Restrict to Unit    Routine Programming     As clinically indicated    Self Injury Precaution    Status 15     Every 15 minutes.    Suicide precautions: Suicide Risk: MODERATE; Clinical rationale to override score: modification to the care environment, lack of access to a plan for self-harm     Patients on Suicide Precautions should have a Combination Diet ordered that includes a Diet selection(s) AND a Behavioral Tray selection for Safe Tray - with utensils, or Safe Tray - NO utensils       Order Specific Question:   Suicide Risk     Answer:   MODERATE     Order Specific Question:   Clinical rationale to override score:     Answer:   modification to the care environment     Order Specific Question:   Clinical rationale to override score:     Answer:   lack of access to a plan for self-harm       Risk Assessment:  Risk for harm is elevated.  Risk factors: SI, impulsive, and psychosocial stressors  Protective factors: engaged in treatment     SIO:  none    Disposition: Pending stabilization, medication optimization, & development of a safe discharge plan.     Attestations   Patient seen and discussed with attending physician, Dr. Maria Esther Jay who is in agreement with my assessment and plan.    Sarah Ogden D.O.  Psychiatry Resident  [facility]     This patient has been seen and evaluated by me, Maria Esther Swanson DO.  I have discussed this patient with the team including the resident and medical student(s) and I agree with the findings and plan in this note.  Dr. Maria Esther Swanson DO, DENISE

## 2024-05-02 NOTE — PLAN OF CARE
"The pt intermittently visible out on milieu watching TV, withdrawn to self and not appeared interacting with peer. Upon approach presented calm/sad in mood, flat/blunted in affect, guarded with minimal response, speech soft/quit, endorsed both anxiety and depression 7/10, endorsed passive SI without plan stating on and off occasionally, denied of HI, hallucination and contracted for safety. Appetite good and adequate fluid intake. Comply with med and care.    /83 (BP Location: Left arm, Patient Position: Sitting, Cuff Size: Adult Large)   Pulse 86   Temp 97.8  F (36.6  C) (Oral)   Resp 16   Ht 1.727 m (5' 8\")   Wt 111.9 kg (246 lb 9.6 oz)   SpO2 97%   BMI 37.50 kg/m        Problem: Suicide Risk  Goal: Absence of Self-Harm  Outcome: Progressing  Intervention: Assess Risk to Self and Maintain Safety  Recent Flowsheet Documentation  Taken 5/2/2024 1700 by Idania Hernandez RN  Behavior Management:   behavioral plan reviewed   boundaries reinforced  Self-Harm Prevention: environmental self-harm risks assessed  Intervention: Promote Psychosocial Wellbeing  Recent Flowsheet Documentation  Taken 5/2/2024 1700 by Idania Hernandez RN  Supportive Measures:   active listening utilized                                    self-care encouraged   self-reflection promoted                                  self-responsibility promoted   verbalization of feelings encouraged  Family/Support System Care:   involvement promoted                                    presence promoted   self-care encouraged  Intervention: Establish Safety Plan and Continuity of Care  Recent Flowsheet Documentation  Taken 5/2/2024 1700 by Idania Hernandez RN  Safe Transition Promotion: protective factors promoted     Problem: Depression  Goal: Improved Mood  Outcome: Progressing  Intervention: Monitor and Manage Depressive Symptoms  Recent Flowsheet Documentation  Taken 5/2/2024 1700 by Idania Hernandez RN  Supportive Measures:   active " listening utilized                                    self-care encouraged   self-reflection promoted                                  self-responsibility promoted   verbalization of feelings encouraged  Family/Support System Care:   involvement promoted                                     presence promoted   self-care encouraged     Problem: Anxiety  Goal: Anxiety Reduction or Resolution  Outcome: Progressing  Intervention: Promote Anxiety Reduction  Recent Flowsheet Documentation  Taken 5/2/2024 1700 by Idania Hernandez RN  Supportive Measures:   active listening utilized                                    self-care encouraged   self-reflection promoted                                  self-responsibility promoted   verbalization of feelings encouraged  Family/Support System Care:   involvement promoted                                     presence promoted   self-care encouraged     Problem: Suicidal Behavior  Goal: Suicidal Behavior is Absent or Managed  Outcome: Progressing  Intervention: Provide Immediate and Ongoing Protective Physical Environment  Recent Flowsheet Documentation  Taken 5/2/2024 1700 by Idania Hernandez RN  Safe Transition Promotion: protective factors promoted   Goal Outcome Evaluation:    Plan of Care Reviewed With: patient

## 2024-05-02 NOTE — PLAN OF CARE
"  Problem: Adult Behavioral Health Plan of Care  Goal: Plan of Care Review  Outcome: Progressing  Flowsheets  Taken 5/2/2024 1453  Plan of Care Reviewed With: patient  Overall Patient Progress: improving  Patient Agreement with Plan of Care: agrees  Taken 5/2/2024 1000  Patient Agreement with Plan of Care: agrees   Goal Outcome Evaluation:    Plan of Care Reviewed With: patient Plan of Care Reviewed With: patient    Overall Patient Progress: improvingOverall Patient Progress: improving       Behavioral  Pt slept 7 hours overnight; eating and hydrating adequately. Compliant with medications. Attending to ADL's independently; no behavioral escalation or safety concerns noted this shift. pleasant and cooperative upon approach; speech slow in response, soft but able to make needs known; Pt was seen intermittently on the lounge but does not interacting with peers. Pt is isolative to self. Attended groups. Pt denied SI stated, \"not at this moment\", SIB, HI, and hallucinations; affect flat, blunted and guarded.  Mood is calm. Pt endorsed depression and anxiety as \"moderate.\" Pt is prince for safety.    Medical    No complaints of physical pain/discomfort this shift.     PRNS:      Plan  .        "

## 2024-05-03 PROCEDURE — 99232 SBSQ HOSP IP/OBS MODERATE 35: CPT | Mod: GC | Performed by: STUDENT IN AN ORGANIZED HEALTH CARE EDUCATION/TRAINING PROGRAM

## 2024-05-03 PROCEDURE — 250N000013 HC RX MED GY IP 250 OP 250 PS 637

## 2024-05-03 PROCEDURE — 124N000002 HC R&B MH UMMC

## 2024-05-03 RX ADMIN — FLUOXETINE HYDROCHLORIDE 30 MG: 10 CAPSULE ORAL at 08:18

## 2024-05-03 RX ADMIN — Medication 7.5 MG: at 21:04

## 2024-05-03 RX ADMIN — PRAZOSIN HYDROCHLORIDE 2 MG: 2 CAPSULE ORAL at 21:04

## 2024-05-03 ASSESSMENT — ACTIVITIES OF DAILY LIVING (ADL)
ADLS_ACUITY_SCORE: 43
DRESS: SCRUBS (BEHAVIORAL HEALTH);INDEPENDENT
ADLS_ACUITY_SCORE: 43
LAUNDRY: UNABLE TO COMPLETE
DRESS: SCRUBS (BEHAVIORAL HEALTH);INDEPENDENT
HYGIENE/GROOMING: HANDWASHING;SHOWER;INDEPENDENT
ADLS_ACUITY_SCORE: 43
ORAL_HYGIENE: INDEPENDENT;PROMPTS
ADLS_ACUITY_SCORE: 43
ADLS_ACUITY_SCORE: 43
LAUNDRY: UNABLE TO COMPLETE
ADLS_ACUITY_SCORE: 43
HYGIENE/GROOMING: HANDWASHING;SHOWER;INDEPENDENT
ADLS_ACUITY_SCORE: 43
ORAL_HYGIENE: INDEPENDENT;PROMPTS

## 2024-05-03 NOTE — PROGRESS NOTES
"  ----------------------------------------------------------------------------------------------------------  [facility]  Psychiatry Progress Note  Hospital Day #10     Interim History:     The patient's care was discussed with the treatment team and chart notes were reviewed.    Vitals: Hypertensive to 140s, otherwise VSS  Sleep: 6.5 hours (05/03/24 0628)  Scheduled medications: Took all scheduled medications as prescribed  Psychiatric PRN medications:   Last 24H PRN:      hydrOXYzine HCl (ATARAX) tablet 25 mg, 25 mg at 05/02/24 1013    Staff Report: No acute events overnight or safety concerns noted.  Cooperative with cares and withdrawn.  See staff note for additional details.     Subjective:     Patient Interview:  Patient was seen and evaluated in the treatment room with team. He states he has been doing well. Wanted to keep his medications the same. Requested to have a sleep study while in pt and understood that it is an outpatient assessment.  We encouraged him to attend more groups. No further concerns expressed. No reports of SI, although he endorsed passive SI last night per nursing notes. It is likely that his SI and SIB are OCD-related intrusive thoughts. He was calm and cooperative during the interview and has improved over the course of his hospital stay.     ROS:  Negative unless stated above     Objective:     Vitals:  /83 (BP Location: Left arm, Patient Position: Sitting, Cuff Size: Adult Large)   Pulse 86   Temp 97.8  F (36.6  C) (Oral)   Resp 16   Ht 1.727 m (5' 8\")   Wt 111.9 kg (246 lb 9.6 oz)   SpO2 97%   BMI 37.50 kg/m      Allergies:  No Known Allergies    Current Medications:  Scheduled:  Current Facility-Administered Medications   Medication Dose Route Frequency Provider Last Rate Last Admin     acetaminophen (TYLENOL) tablet 325 mg  325 mg Oral Q4H PRN Tyrese Moe MD         ARIPiprazole (ABILIFY) half-tab 7.5 mg  7.5 mg " Oral At Bedtime Rojelio Baumann MD   7.5 mg at 05/02/24 2049     FLUoxetine (PROzac) capsule 30 mg  30 mg Oral Rojelio Morton MD   30 mg at 05/02/24 0859     hydrOXYzine HCl (ATARAX) tablet 25 mg  25 mg Oral BID PRN Tyrese Moe MD   25 mg at 05/02/24 1013     OLANZapine (zyPREXA) tablet 5 mg  5 mg Oral TID PRN Tyrese Moe MD        Or     OLANZapine (zyPREXA) injection 5 mg  5 mg Intramuscular TID PRN Tyrese Moe MD         ondansetron (ZOFRAN) tablet 4 mg  4 mg Oral Q6H PRN Lydia Dhaliwal MD   4 mg at 04/29/24 1800     polyethylene glycol (MIRALAX) Packet 17 g  17 g Oral Daily PRN Tyrese Moe MD         polyethylene glycol-propylene glycol PF (SYSTANE ULTRA PF) opthalmic solution 1 drop  1 drop Both Eyes Q1H PRN Johnny Jay MD   1 drop at 04/28/24 1702     prazosin (MINIPRESS) capsule 2 mg  2 mg Oral At Bedtime Tyrese Moe MD   2 mg at 05/02/24 2049       PRN:  Current Facility-Administered Medications   Medication Dose Route Frequency Provider Last Rate Last Admin     acetaminophen (TYLENOL) tablet 325 mg  325 mg Oral Q4H PRN Tyrese Moe MD         ARIPiprazole (ABILIFY) half-tab 7.5 mg  7.5 mg Oral At Bedtime Rojelio Baumann MD   7.5 mg at 05/02/24 2049     FLUoxetine (PROzac) capsule 30 mg  30 mg Oral Rojelio Morton MD   30 mg at 05/02/24 0859     hydrOXYzine HCl (ATARAX) tablet 25 mg  25 mg Oral BID PRN Tyrese Moe MD   25 mg at 05/02/24 1013     OLANZapine (zyPREXA) tablet 5 mg  5 mg Oral TID PRN Tyrese Moe MD        Or     OLANZapine (zyPREXA) injection 5 mg  5 mg Intramuscular TID PRN Tyrese Moe MD         ondansetron (ZOFRAN) tablet 4 mg  4 mg Oral Q6H PRN Lydia Dhaliwal MD   4 mg at 04/29/24 1800     polyethylene glycol (MIRALAX) Packet 17 g  17 g Oral Daily PRN Tyrese Moe MD         polyethylene glycol-propylene glycol PF (SYSTANE ULTRA PF) opthalmic solution 1 drop  1 drop Both  "Eyes Q1H Johnny Fagan MD   1 drop at 04/28/24 1702     prazosin (MINIPRESS) capsule 2 mg  2 mg Oral At Bedtime Tyrese Moe MD   2 mg at 05/02/24 2049       Labs and Imaging:  New results:   No results found for this or any previous visit (from the past 24 hour(s)).    Data this admission:  - CBC unremarkable  - CMP unremarkable  - TSH normal  - UDS negative  - Hgb A1c normal, 5.3  - Lipids remarkable for low HDL cholesterol, high LDL cholesterol  - EKG normal sinus rhythm, QTc 427     Mental Status Exam:     Oriented to:  Grossly Oriented  General:  Awake and Alert  Appearance:  appears stated age and Grooming is adequate  Behavior/Attitude:  Calm, Cooperative, and Guarded  Eye Contact: Appropriate  Psychomotor: Normal and No evidence of tics, dystonia, or tardive dyskinesia  no catatonia present  Speech:  appropriate volume/tone, paucity, and hesitant  Language: Fluent in English with appropriate syntax and vocabulary.  Mood:  \"Anxious\"  Affect:  appropriate, congruent with mood, restricted, and anxious  Thought Process:  linear and coherent  Thought Content:    SIB involving cutting self with knife, suicidal ideation with plan (without intent), No HI, does not appear to be responding to internal stimuli, No VH, and No AH; No apparent delusions  Associations:  intact  Insight:  fair due to understanding of condition, but limited 2/2 unclear about goals for admission  Judgment:  fair due to voluntary presentation  Impulse control: partial  Attention Span:  grossly intact and adequate for conversation  Concentration:  grossly intact  Recent and Remote Memory:  not formally assessed  Fund of Knowledge: estimated below average  Muscle Strength and Tone:  not formally assessed  Gait and Station: Normal     Psychiatric Assessment     Pj Tucker is a 29 year old male previously diagnosed with MDD, ADHD, autism spectrum disorder who presented voluntarily by himself with suicidal  in the context of " increased psychosocial stressors, medication non-adherence. No prior psychiatric hospitalizations. Significant symptoms on admission include suicidal ideation with plan, increased impulsivity. The MSE on admission was pertinent for dysphoric but reactive affect, linear and goal directed in thought. Biological contributions to mental health presentation include diagnoses of autism spectrum disorder, medication non-adherence. Psychological contributions to mental health presentation include diagnoses of ADHD, MDD as well as insight, coping, personality.  Social factors contributing to mental health presentation include financial stressors, communication challenges. Protective factors include willingness to engage in treatment, lack of prior suicide attempts/SIB.      In summary, the patient's reported symptoms of suicidal ideation in the context of recent psychosocial stressors are consistent with depressive disorder, most likely major depressive episode. Differentials include unspecified depressive disorder, adjustment disorder, intrusive thoughts 2/2 OCD.  He will likely benefit from medication management, stabilization and observation this admission.     Given that he currently has SI, patient warrants inpatient psychiatric hospitalization to maintain his safety.      Psychiatric Plan by Diagnosis      Today's changes:  - None     # Suicidal Ideation with Plan   # SIB with Plan   # MDD, recurrent, severe  - ABILIFY 7.5 mg PO qHS  - PROZAC 30 mg PO qAM  - PRAZOSIN 2 mg PO at bedtime    # Unspecified anxiety disorder  - ATARAX 25 mg PO TID PRN (for anxiety)    # ASD  # ADHD  -No medications at this time     Pertinent Labs/Monitoring:   - Qtc 427 ms     Additional Plans:  - Patient will be treated in therapeutic milieu with appropriate individual and group therapies as described       Psychiatric Hospital Course:      Pj Tucker was admitted to [facility] as a voluntary patient. PTA Abilify 5mg, Prozac 20mg,  prazosin 2mg, and hydroxyzine 25mg were continued. For medication optimization, Abilify was increased to 7.5mg po to target mood stabilization effects. Prozac was increased to 30mg for medication optimization. PTA lamictal was discontinued given little benefit in symptoms control.       The risks, benefits, alternatives, and side effects were discussed and understood by the patient.     Medical Assessment and Plan     Medical diagnoses to be addressed this admission:  N/A    Medical course: Patient was physically examined by the ED prior to being transferred to the unit and was found to be medically stable and appropriate for admission.     Consults: medicine     Checklist     Legal Status: Voluntary     Safety Assessment:   Behavioral Orders   Procedures     Code 1 - Restrict to Unit     Routine Programming     As clinically indicated     Self Injury Precaution     Status 15     Every 15 minutes.     Suicide precautions: Suicide Risk: MODERATE; Clinical rationale to override score: modification to the care environment, lack of access to a plan for self-harm     Patients on Suicide Precautions should have a Combination Diet ordered that includes a Diet selection(s) AND a Behavioral Tray selection for Safe Tray - with utensils, or Safe Tray - NO utensils       Order Specific Question:   Suicide Risk     Answer:   MODERATE     Order Specific Question:   Clinical rationale to override score:     Answer:   modification to the care environment     Order Specific Question:   Clinical rationale to override score:     Answer:   lack of access to a plan for self-harm       Risk Assessment:  Risk for harm is elevated.  Risk factors: SI, impulsive, and psychosocial stressors  Protective factors: engaged in treatment     SIO: none    Disposition: Pending stabilization, medication optimization, & development of a safe discharge plan.    John Blake MD  PGY-4 Psychiatry Resident

## 2024-05-03 NOTE — PLAN OF CARE
"Goal Outcome Evaluation:    Plan of Care Reviewed With: patient Plan of Care Reviewed With: patient    Overall Patient Progress: improvingOverall Patient Progress: improving       Problem: Adult Behavioral Health Plan of Care  Goal: Plan of Care Review  5/3/2024 1450 by Dori Cain RN  Outcome: Progressing  5/3/2024 1450 by Dori Cain, RN  Outcome: Progressing  Flowsheets  Taken 5/3/2024 1450  Plan of Care Reviewed With: patient  Overall Patient Progress: improving  Patient Agreement with Plan of Care: agrees  Taken 5/3/2024 1153  Patient Agreement with Plan of Care: agrees     Behavioral  Pt slept 7 hours overnight; Presented with anxiety and depression both 6/10. Denies other psych symptoms. Pt is prince for safety. Pt is eating and hydrating adequately. Compliant with medications. Attending to ADL's independently; no behavioral escalation or safety concerns noted this shift. pleasant and cooperative upon approach; speech is slow in response, soft but able to make needs known; Pt was seen intermittently on the lounge but does not interacting with peers. Pt is isolative to self. Pt affect flat, blunted and guarded.  Mood is calm.      Medical     No complaints of physical pain/discomfort this shift. Blood pressure 129/83, pulse 86, temperature 96.9  F (36.1  C), temperature source Temporal, resp. rate 18, height 1.727 m (5' 8\"), weight 111.9 kg (246 lb 9.6 oz), SpO2 97%.       PRNS:        Plan  .       "

## 2024-05-03 NOTE — PLAN OF CARE
BEH IP Unit Acuity Rating Score (UARS)  Patient is given one point for every criteria they meet.     CRITERIA SCORING   On a 72 hour hold, court hold, committed, stay of commitment, or revocation. 0    Patient LOS on BEH unit exceeds 20 days. 0  LOS: 10   Patient under guardianship, 55+, otherwise medically complex, or under age 11. 0   Suicide ideation without relief of precipitating factors. 1   Current plan for suicide. 0   Current plan for homicide. 0   Imminent risk or actual attempt to seriously harm another without relief of factors precipitating the attempt. 1   Severe dysfunction in daily living (ex: complete neglect for self care, extreme disruption in vegetative function, extreme deterioration in social interactions). 1   Recent (last 7 days) or current physical aggression in the ED or on unit. 0   Restraints or seclusion episode in past 72 hours. 0   Recent (last 7 days) or current verbal aggression, agitation, yelling, etc., while in the ED or unit. 0   Active psychosis. 0   Need for constant or near constant redirection (from leaving, from others, etc).  0   Intrusive or disruptive behaviors. 0   Patient requires 3 or more hours of individualized nursing care per 8-hour shift (i.e. for ADLs, meds, therapeutic interventions). 0   TOTAL 3

## 2024-05-03 NOTE — PLAN OF CARE
"  Problem: Sleep Disturbance  Goal: Adequate Sleep/Rest  Outcome: Progressing   Goal Outcome Evaluation:    Pt had an uneventful night. He appears to have slept for 6.5 hours. No pain reported, and no PRN given. No new concern at this time. Continue to monitor and offer support.     Vital signs:  Temp: 97.8  F (36.6  C) Temp src: Oral BP: 129/83 Pulse: 86   Resp: 16 SpO2: 97 % O2 Device: None (Room air)     Weight: 111.9 kg (246 lb 9.6 oz)  Estimated body mass index is 37.5 kg/m  as calculated from the following:    Height as of this encounter: 1.727 m (5' 8\").    Weight as of this encounter: 111.9 kg (246 lb 9.6 oz).       "

## 2024-05-04 PROCEDURE — 124N000002 HC R&B MH UMMC

## 2024-05-04 PROCEDURE — 250N000013 HC RX MED GY IP 250 OP 250 PS 637

## 2024-05-04 PROCEDURE — 90853 GROUP PSYCHOTHERAPY: CPT

## 2024-05-04 RX ADMIN — Medication 7.5 MG: at 20:40

## 2024-05-04 RX ADMIN — FLUOXETINE HYDROCHLORIDE 30 MG: 10 CAPSULE ORAL at 08:22

## 2024-05-04 RX ADMIN — PRAZOSIN HYDROCHLORIDE 2 MG: 2 CAPSULE ORAL at 20:41

## 2024-05-04 ASSESSMENT — ACTIVITIES OF DAILY LIVING (ADL)
ADLS_ACUITY_SCORE: 43
HYGIENE/GROOMING: INDEPENDENT
ORAL_HYGIENE: INDEPENDENT;PROMPTS
DRESS: SCRUBS (BEHAVIORAL HEALTH);INDEPENDENT
ORAL_HYGIENE: INDEPENDENT
ADLS_ACUITY_SCORE: 43
DRESS: INDEPENDENT
HYGIENE/GROOMING: HANDWASHING;SHOWER;INDEPENDENT
ADLS_ACUITY_SCORE: 43
ADLS_ACUITY_SCORE: 43
LAUNDRY: WITH SUPERVISION
LAUNDRY: WITH SUPERVISION

## 2024-05-04 NOTE — PLAN OF CARE
The pt intermittently visible out on milieu, minimally social with selected peer and mostly kept to self. The pt presented calm/sad in mood, flat in affect, soft/quit in speech, guarded, acknowledged improvement in mood but endorsed occasional passive chronic SI thought without plan which the pt able to distract using positive coping and playing Sodoku game, endorsed both anxiety and depression 5/10, denied of AH/VH, HI and contracted for safety. Initiated shower and well groomed. Ate 100% supper and adequate fluid intake. No behavioral safety concern noted/reported. Comply with med and care.    Problem: Adult Behavioral Health Plan of Care  Goal: Plan of Care Review  Outcome: Progressing  Flowsheets (Taken 5/4/2024 1630)  Plan of Care Reviewed With: patient  Overall Patient Progress: improving  Patient Agreement with Plan of Care: agrees     Problem: Suicide Risk  Goal: Absence of Self-Harm  Outcome: Progressing  Intervention: Assess Risk to Self and Maintain Safety  Recent Flowsheet Documentation  Taken 5/4/2024 1630 by Idania Hernandez RN  Behavior Management:   behavioral plan reviewed                                     boundaries reinforced   impulse control promoted  Self-Harm Prevention: environmental self-harm risks assessed  Intervention: Promote Psychosocial Wellbeing  Recent Flowsheet Documentation  Taken 5/4/2024 1630 by Idania Hernandez RN  Supportive Measures:   active listening utilized                                  self-care encouraged   self-reflection promoted                                self-responsibility promoted   verbalization of feelings encouraged  Family/Support System Care:   involvement promoted                             presence promoted   self-care encouraged  Intervention: Establish Safety Plan and Continuity of Care  Recent Flowsheet Documentation  Taken 5/4/2024 1630 by Idania Hernandez RN  Safe Transition Promotion: protective factors promoted     Problem:  Depression  Goal: Improved Mood  Outcome: Progressing  Intervention: Monitor and Manage Depressive Symptoms  Recent Flowsheet Documentation  Taken 5/4/2024 1630 by Idania Hernandez RN  Supportive Measures:   active listening utilized                                   self-care encouraged   self-reflection promoted                                 self-responsibility promoted   verbalization of feelings encouraged  Family/Support System Care:   involvement promoted                                      presence promoted   self-care encouraged     Problem: Anxiety  Goal: Anxiety Reduction or Resolution  Outcome: Progressing  Intervention: Promote Anxiety Reduction  Recent Flowsheet Documentation  Taken 5/4/2024 1630 by Idania Hernandez RN  Supportive Measures:   active listening utilized                                   self-care encouraged   self-reflection promoted                                 self-responsibility promoted   verbalization of feelings encouraged  Family/Support System Care:   involvement promoted                                    presence promoted   self-care encouraged     Problem: Suicidal Behavior  Goal: Suicidal Behavior is Absent or Managed  Outcome: Progressing  Intervention: Provide Immediate and Ongoing Protective Physical Environment  Recent Flowsheet Documentation  Taken 5/4/2024 1630 by Idania Hernandez RN  Safe Transition Promotion: protective factors promoted   Goal Outcome Evaluation:    Plan of Care Reviewed With: patient Plan of Care Reviewed With: patient    Overall Patient Progress: improvingOverall Patient Progress: improving

## 2024-05-04 NOTE — PLAN OF CARE
"Goal Outcome Evaluation:    Plan of Care Reviewed With: patient Plan of Care Reviewed With: patient    Overall Patient Progress: improvingOverall Patient Progress: improving       Problem: Adult Behavioral Health Plan of Care  Goal: Plan of Care Review  Outcome: Progressing  Flowsheets  Taken 5/4/2024 1358  Plan of Care Reviewed With: patient  Overall Patient Progress: improving  Patient Agreement with Plan of Care: agrees  Taken 5/4/2024 1200  Patient Agreement with Plan of Care: agrees     Behavioral  Pt slept about 7 hours overnight. This morning pt described his mood as anxious and depressed. Rated both 5/10. Denies SI/HI/SIB/hallucinations and pain. Pt is prince for safety. Pt is eating and hydrating adequately. Compliant with medications. Attending to ADL's independently; no acute incident noted this shift. Pt is pleasant upon approach but he is guarded; speech is slow in response; Pt was seen intermittently on the lounge with minimum interactions with select peers. Pt was observed playing cristel with another peer. Pt affect flat and blunted.      Medical     No complaints of physical pain/discomfort this shift. Blood pressure 120/86, pulse 72, temperature 97.2  F (36.2  C), temperature source Oral, resp. rate 16, height 1.727 m (5' 8\"), weight 111.9 kg (246 lb 9.6 oz), SpO2 97%.       PRNS:        Plan     "

## 2024-05-04 NOTE — PLAN OF CARE
Problem: Sleep Disturbance  Goal: Adequate Sleep/Rest  Outcome: Progressing   Goal Outcome Evaluation:    Pt appears to have slept for 7 hours. No new concern noted this shift. Pt denies pain and all psyche symptoms. Nursing to continue to monitor and offer support .    Temp: 97.9  F (36.6  C) Temp src: Oral BP: 111/79 Pulse: 72   Resp: 16 SpO2: 98 % O2 Device: None (Room air)

## 2024-05-04 NOTE — PLAN OF CARE
"Goal Outcome Evaluation:    Plan of Care Reviewed With: patient Plan of Care Reviewed With: patient    Overall Patient Progress: improvingOverall Patient Progress: improving       Problem: Adult Behavioral Health Plan of Care  Goal: Plan of Care Review  5/3/2024 2059 by Dori Cain RN  Outcome: Progressing  Flowsheets  Taken 5/3/2024 2059  Plan of Care Reviewed With: patient  Overall Patient Progress: improving  Patient Agreement with Plan of Care: agrees  Taken 5/3/2024 1800  Patient Agreement with Plan of Care: agrees     Behavioral  This evening pt described his mood as \"somewhat anxious\" and depressed. Denies SI/HI/SIB/hallucinations and pain. Pt is prince for safety. Pt is eating and hydrating adequately. Compliant with medications. Attending to ADL's independently; no acute incident noted this shift. pleasant upon approach but he is guarded; speech is slow in response, but able to make needs known; Pt was seen intermittently on the lounge but does not interacting with peers. Pt is isolative to self. Pt refused to attend evening group. Pt affect flat and blunted. Mood is calm. Pt requested the eggcrate mattress. Nursing order was placed.      Medical     No complaints of physical pain/discomfort this shift. Blood pressure 111/79, pulse 72, temperature 97.9  F (36.6  C), temperature source Oral, resp. rate 16, height 1.727 m (5' 8\"), weight 111.9 kg (246 lb 9.6 oz), SpO2 98%.            PRNS:        Plan    "

## 2024-05-05 PROCEDURE — 124N000002 HC R&B MH UMMC

## 2024-05-05 PROCEDURE — 250N000013 HC RX MED GY IP 250 OP 250 PS 637

## 2024-05-05 RX ADMIN — FLUOXETINE HYDROCHLORIDE 30 MG: 10 CAPSULE ORAL at 08:40

## 2024-05-05 RX ADMIN — Medication 7.5 MG: at 21:06

## 2024-05-05 RX ADMIN — HYDROXYZINE HYDROCHLORIDE 25 MG: 25 TABLET, FILM COATED ORAL at 17:02

## 2024-05-05 RX ADMIN — PRAZOSIN HYDROCHLORIDE 2 MG: 2 CAPSULE ORAL at 21:06

## 2024-05-05 ASSESSMENT — ACTIVITIES OF DAILY LIVING (ADL)
ADLS_ACUITY_SCORE: 43
LAUNDRY: WITH SUPERVISION
ADLS_ACUITY_SCORE: 43
ORAL_HYGIENE: INDEPENDENT
DRESS: INDEPENDENT
ORAL_HYGIENE: INDEPENDENT;PROMPTS
ADLS_ACUITY_SCORE: 43
DRESS: SCRUBS (BEHAVIORAL HEALTH);INDEPENDENT
ADLS_ACUITY_SCORE: 43
LAUNDRY: WITH SUPERVISION
ADLS_ACUITY_SCORE: 43
HYGIENE/GROOMING: INDEPENDENT
ADLS_ACUITY_SCORE: 43
ADLS_ACUITY_SCORE: 43
HYGIENE/GROOMING: HANDWASHING;SHOWER;INDEPENDENT
ADLS_ACUITY_SCORE: 43

## 2024-05-05 NOTE — PLAN OF CARE
"Goal Outcome Evaluation:    Plan of Care Reviewed With: patient Plan of Care Reviewed With: patient    Overall Patient Progress: improvingOverall Patient Progress: improving       Problem: Adult Behavioral Health Plan of Care  Goal: Plan of Care Review  Outcome: Progressing  Flowsheets  Taken 5/5/2024 1214  Plan of Care Reviewed With: patient  Overall Patient Progress: improving  Patient Agreement with Plan of Care: agrees  Taken 5/5/2024 1100  Patient Agreement with Plan of Care: agrees     Behavioral  Pt presented with anxiety and depression. Rated both 5/10. Pt denied SI, SIB, HI, and hallucinations; affect is flat and blunted; Pt  eating and hydrating adequately. Compliant with medications. Attending to ADL's independently; Took a shower this morning. no behavioral escalation or safety concerns noted this shift. pleasant and cooperative upon approach; speech is slow and delay in response but able to make needs known. Pt has minimal interactions with peers.     Medical    No complaints of physical pain/discomfort this shift. Vital signs stable. Blood pressure 117/83, pulse 84, temperature 97.5  F (36.4  C), resp. rate 16, height 1.727 m (5' 8\"), weight 112.9 kg (248 lb 14.4 oz), SpO2 96%.      PRNS:    Plan      "

## 2024-05-05 NOTE — PLAN OF CARE
"  Problem: Sleep Disturbance  Goal: Adequate Sleep/Rest  Outcome: Progressing   Goal Outcome Evaluation:    Pt appears to have slept for 7 hours. No c/c pain and no PRN given.  There are no new concerns noted at this time. Nursing to continue to monitor and offer support.    Vital signs:  Temp: 97.8  F (36.6  C) Temp src: Oral BP: 117/83 Pulse: 84   Resp: 16 SpO2: 98 % O2 Device: None (Room air)     Weight: 111.9 kg (246 lb 9.6 oz)  Estimated body mass index is 37.5 kg/m  as calculated from the following:    Height as of this encounter: 1.727 m (5' 8\").    Weight as of this encounter: 111.9 kg (246 lb 9.6 oz).       "

## 2024-05-06 PROCEDURE — 250N000013 HC RX MED GY IP 250 OP 250 PS 637: Performed by: STUDENT IN AN ORGANIZED HEALTH CARE EDUCATION/TRAINING PROGRAM

## 2024-05-06 PROCEDURE — 250N000013 HC RX MED GY IP 250 OP 250 PS 637

## 2024-05-06 PROCEDURE — 99232 SBSQ HOSP IP/OBS MODERATE 35: CPT | Performed by: STUDENT IN AN ORGANIZED HEALTH CARE EDUCATION/TRAINING PROGRAM

## 2024-05-06 PROCEDURE — 124N000002 HC R&B MH UMMC

## 2024-05-06 RX ORDER — ARIPIPRAZOLE 10 MG/1
10 TABLET ORAL AT BEDTIME
Status: DISCONTINUED | OUTPATIENT
Start: 2024-05-06 | End: 2024-05-15 | Stop reason: HOSPADM

## 2024-05-06 RX ADMIN — FLUOXETINE HYDROCHLORIDE 30 MG: 10 CAPSULE ORAL at 08:46

## 2024-05-06 RX ADMIN — HYDROXYZINE HYDROCHLORIDE 25 MG: 25 TABLET, FILM COATED ORAL at 19:16

## 2024-05-06 RX ADMIN — PRAZOSIN HYDROCHLORIDE 2 MG: 2 CAPSULE ORAL at 21:05

## 2024-05-06 RX ADMIN — ARIPIPRAZOLE 10 MG: 10 TABLET ORAL at 21:05

## 2024-05-06 ASSESSMENT — ACTIVITIES OF DAILY LIVING (ADL)
ADLS_ACUITY_SCORE: 43
LAUNDRY: WITH SUPERVISION
ADLS_ACUITY_SCORE: 43
ADLS_ACUITY_SCORE: 43
ORAL_HYGIENE: INDEPENDENT;PROMPTS
ADLS_ACUITY_SCORE: 43
HYGIENE/GROOMING: HANDWASHING;SHOWER;INDEPENDENT
ADLS_ACUITY_SCORE: 43
ADLS_ACUITY_SCORE: 43
ORAL_HYGIENE: INDEPENDENT;PROMPTS
ADLS_ACUITY_SCORE: 43
HYGIENE/GROOMING: HANDWASHING;SHOWER;INDEPENDENT
ADLS_ACUITY_SCORE: 43
DRESS: SCRUBS (BEHAVIORAL HEALTH);INDEPENDENT;STREET CLOTHES
ADLS_ACUITY_SCORE: 43
LAUNDRY: WITH SUPERVISION
ADLS_ACUITY_SCORE: 43
ADLS_ACUITY_SCORE: 43
DRESS: INDEPENDENT

## 2024-05-06 NOTE — PROGRESS NOTES
"  ----------------------------------------------------------------------------------------------------------  [facility]  Psychiatry Progress Note  Hospital Day #13     Interim History:     The patient's care was discussed with the treatment team and chart notes were reviewed.    Vitals: Hypertensive to 140s, otherwise VSS  Sleep: 7 hours (05/06/24 0620)  Scheduled medications: Took all scheduled medications as prescribed  Psychiatric PRN medications:   Last 24H PRN:     hydrOXYzine HCl (ATARAX) tablet 25 mg, 25 mg at 05/05/24 1702    Staff Report: No acute events overnight or safety concerns noted.  Cooperative with cares and withdrawn.  See staff note for additional details.     Subjective:     Patient Interview:  Patient was seen and evaluated in the treatment room with team. He states he is \"doing about the same\". Still has chronic SI, but feels it is sl less in frequency or intensity. Would like to experiment with higher dose of abilify. We discuss interval placement efforts and plan to coordinate care with [facility] for next steps. He is a vulnerable adult and would be at a high risk of decompensation and readmission if discharged without a structured aftercare.     ROS:  Negative unless stated above     Objective:     Vitals:  BP (!) 131/93 (BP Location: Left arm, Patient Position: Sitting, Cuff Size: Adult Large)   Pulse 89   Temp 97.5  F (36.4  C) (Temporal)   Resp 16   Ht 1.727 m (5' 8\")   Wt 112.9 kg (248 lb 14.4 oz)   SpO2 99%   BMI 37.85 kg/m      Allergies:  No Known Allergies    Current Medications:  Scheduled:  Current Facility-Administered Medications   Medication Dose Route Frequency Provider Last Rate Last Admin    acetaminophen (TYLENOL) tablet 325 mg  325 mg Oral Q4H PRN Tyrese Moe MD        ARIPiprazole (ABILIFY) tablet 10 mg  10 mg Oral At Bedtime Johnny Jay MD        FLUoxetine (PROzac) capsule 30 mg  30 mg Oral DUNCAN Baumann, " MD Rjoelio   30 mg at 05/06/24 0846    hydrOXYzine HCl (ATARAX) tablet 25 mg  25 mg Oral BID PRN Tyrese Moe MD   25 mg at 05/05/24 1702    OLANZapine (zyPREXA) tablet 5 mg  5 mg Oral TID PRN Tyrese Moe MD        Or    OLANZapine (zyPREXA) injection 5 mg  5 mg Intramuscular TID PRN Tyrese Moe MD        ondansetron (ZOFRAN) tablet 4 mg  4 mg Oral Q6H PRN Lydia Dhaliwal MD   4 mg at 04/29/24 1800    polyethylene glycol (MIRALAX) Packet 17 g  17 g Oral Daily PRN Tyrese Moe MD        polyethylene glycol-propylene glycol PF (SYSTANE ULTRA PF) opthalmic solution 1 drop  1 drop Both Eyes Q1H PRN Johnny Jay MD   1 drop at 04/28/24 1702    prazosin (MINIPRESS) capsule 2 mg  2 mg Oral At Bedtime Tyrese Moe MD   2 mg at 05/05/24 2106       PRN:  Current Facility-Administered Medications   Medication Dose Route Frequency Provider Last Rate Last Admin    acetaminophen (TYLENOL) tablet 325 mg  325 mg Oral Q4H PRN Tyrese Moe MD        ARIPiprazole (ABILIFY) tablet 10 mg  10 mg Oral At Bedtime Johnny Jay MD        FLUoxetine (PROzac) capsule 30 mg  30 mg Oral [facility] Rojelio Baumann MD   30 mg at 05/06/24 0846    hydrOXYzine HCl (ATARAX) tablet 25 mg  25 mg Oral BID PRN Tyrese Moe MD   25 mg at 05/05/24 1702    OLANZapine (zyPREXA) tablet 5 mg  5 mg Oral TID PRN Tyrese Moe MD        Or    OLANZapine (zyPREXA) injection 5 mg  5 mg Intramuscular TID PRN Tyrese Moe MD        ondansetron (ZOFRAN) tablet 4 mg  4 mg Oral Q6H PRN Lydia Dhaliwal MD   4 mg at 04/29/24 1800    polyethylene glycol (MIRALAX) Packet 17 g  17 g Oral Daily PRN Tyrese Moe MD        polyethylene glycol-propylene glycol PF (SYSTANE ULTRA PF) opthalmic solution 1 drop  1 drop Both Eyes Q1H PRN Johnny Jay MD   1 drop at 04/28/24 1702    prazosin (MINIPRESS) capsule 2 mg  2 mg Oral At Bedtime Tyrese Moe MD   2 mg at 05/05/24 8725       Labs and  "Imaging:  New results:   No results found for this or any previous visit (from the past 24 hour(s)).    Data this admission:  - CBC unremarkable  - CMP unremarkable  - TSH normal  - UDS negative  - Hgb A1c normal, 5.3  - Lipids remarkable for low HDL cholesterol, high LDL cholesterol  - EKG normal sinus rhythm, QTc 427     Mental Status Exam:     Oriented to:  Grossly Oriented  General:  Awake and Alert  Appearance:  appears stated age and Grooming is adequate  Behavior/Attitude:  Calm, Cooperative, and Guarded  Eye Contact: Appropriate  Psychomotor: Normal and No evidence of tics, dystonia, or tardive dyskinesia  no catatonia present  Speech:  appropriate volume/tone, paucity, and hesitant  Language: Fluent in English with appropriate syntax and vocabulary.  Mood:  \"Anxious\"  Affect:  appropriate, congruent with mood, restricted, and anxious  Thought Process:  linear and coherent  Thought Content:    SIB involving cutting self with knife, suicidal ideation with plan (without intent), No HI, does not appear to be responding to internal stimuli, No VH, and No AH; No apparent delusions  Associations:  intact  Insight:  fair due to understanding of condition, but limited 2/2 unclear about goals for admission  Judgment:  fair due to voluntary presentation  Impulse control: partial  Attention Span:  grossly intact and adequate for conversation  Concentration:  grossly intact  Recent and Remote Memory:  not formally assessed  Fund of Knowledge: estimated below average  Muscle Strength and Tone:  not formally assessed  Gait and Station: Normal     Psychiatric Assessment     Pj Tucker is a 29 year old male previously diagnosed with MDD, ADHD, autism spectrum disorder who presented voluntarily by himself with suicidal  in the context of increased psychosocial stressors, medication non-adherence. No prior psychiatric hospitalizations. Significant symptoms on admission include suicidal ideation with plan, increased " impulsivity. The MSE on admission was pertinent for dysphoric but reactive affect, linear and goal directed in thought. Biological contributions to mental health presentation include diagnoses of autism spectrum disorder, medication non-adherence. Psychological contributions to mental health presentation include diagnoses of ADHD, MDD as well as insight, coping, personality.  Social factors contributing to mental health presentation include financial stressors, communication challenges. Protective factors include willingness to engage in treatment, lack of prior suicide attempts/SIB.      In summary, the patient's reported symptoms of suicidal ideation in the context of recent psychosocial stressors are consistent with depressive disorder, most likely major depressive episode. Differentials include unspecified depressive disorder, adjustment disorder, intrusive thoughts 2/2 OCD.  He will likely benefit from medication management, stabilization and observation this admission.     Given that he currently has SI, patient warrants inpatient psychiatric hospitalization to maintain his safety.      Psychiatric Plan by Diagnosis      Today's changes:  - None     # Suicidal Ideation with Plan   # SIB with Plan   # MDD, recurrent, severe  - ABILIFY 7.5 mg PO qHS  - PROZAC 30 mg PO qAM  - PRAZOSIN 2 mg PO at bedtime    # Unspecified anxiety disorder  - ATARAX 25 mg PO TID PRN (for anxiety)    # ASD  # ADHD  -No medications at this time     Pertinent Labs/Monitoring:   - Qtc 427 ms     Additional Plans:  - Patient will be treated in therapeutic milieu with appropriate individual and group therapies as described       Psychiatric Hospital Course:      Pj Tucker was admitted to [facility] as a voluntary patient. PTA Abilify 5mg, Prozac 20mg, prazosin 2mg, and hydroxyzine 25mg were continued. For medication optimization, Abilify was increased to 7.5mg po to target mood stabilization effects. Prozac was increased to 30mg for  medication optimization. PTA lamictal was discontinued given little benefit in symptoms control.       The risks, benefits, alternatives, and side effects were discussed and understood by the patient.     Medical Assessment and Plan     Medical diagnoses to be addressed this admission:  N/A    Medical course: Patient was physically examined by the ED prior to being transferred to the unit and was found to be medically stable and appropriate for admission.     Consults: medicine     Checklist     Legal Status: Voluntary     Safety Assessment:   Behavioral Orders   Procedures    Code 1 - Restrict to Unit    Routine Programming     As clinically indicated    Self Injury Precaution    Status 15     Every 15 minutes.    Suicide precautions: Suicide Risk: MODERATE; Clinical rationale to override score: modification to the care environment, lack of access to a plan for self-harm     Patients on Suicide Precautions should have a Combination Diet ordered that includes a Diet selection(s) AND a Behavioral Tray selection for Safe Tray - with utensils, or Safe Tray - NO utensils       Order Specific Question:   Suicide Risk     Answer:   MODERATE     Order Specific Question:   Clinical rationale to override score:     Answer:   modification to the care environment     Order Specific Question:   Clinical rationale to override score:     Answer:   lack of access to a plan for self-harm       Risk Assessment:  Risk for harm is elevated.  Risk factors: SI, impulsive, and psychosocial stressors  Protective factors: engaged in treatment     SIO: none    Disposition: Pending stabilization, medication optimization, & development of a safe discharge plan.    I saw and evaluated the patient.   I personally spent a total of 35 minutes on care for this patient on the date of the encounter. This includes face-to-face as well as non-face-to-face time reviewing the chart, lab review, and coordination of care.     Johnny Jay MD   Chief of  Mental Health & Addiction Service Line

## 2024-05-06 NOTE — PLAN OF CARE
Number of patients attending the group:  8  Group Length:  1 Hours    Group Therapy     Summary of Group / Topics Discussed:    The goal of Group Psychotherapy is to promote insight to positive choice and change. Group processing is within a supportive and safe environment. Patients  process emotions using verbal group and expressive psychotherapy interventions.    Assessment:   This patient attended this group session which focused on pts asking questions related to mental health diagnoses or symptoms they are struggling to understand. Based on questions raised by the patients, facilitator offered explanations and encouraged other group members to also participate in providing feedback. Specific focus was to ensure that each patient's identified area of need was clarified and demonstrations  engaged to ensure that each answer had a practical way it could be approached during and after hospitalization.     Topics covered/discussed included paranoia, autism spectrum disorder,  insight orientation (reasons a pt might not recognize an early warning sign before the onset of a clinical episode), and tools to aid in addressing those.     Another area of focus was ways to address the fear or dislike of medication. This was approach from a psychotherapeutic scope which focused on explaining why medications that are prescribed by providers should be taken. Understanding was built from a deficit/supplement framework, which the patients reported helped them gain more perspective on why they should adhere to taking their medications as prescribed.     Patient Response: This patient attended the group and stayed the entire session.      Ben Jerez, Ph.D., [facility]

## 2024-05-06 NOTE — PLAN OF CARE
BEH IP Unit Acuity Rating Score (UARS)  Patient is given one point for every criteria they meet.     CRITERIA SCORING   On a 72 hour hold, court hold, committed, stay of commitment, or revocation. 0    Patient LOS on BEH unit exceeds 20 days. 0  LOS: 13   Patient under guardianship, 55+, otherwise medically complex, or under age 11. 0   Suicide ideation without relief of precipitating factors. 1   Current plan for suicide. 0   Current plan for homicide. 0   Imminent risk or actual attempt to seriously harm another without relief of factors precipitating the attempt. 1   Severe dysfunction in daily living (ex: complete neglect for self care, extreme disruption in vegetative function, extreme deterioration in social interactions). 1   Recent (last 7 days) or current physical aggression in the ED or on unit. 0   Restraints or seclusion episode in past 72 hours. 0   Recent (last 7 days) or current verbal aggression, agitation, yelling, etc., while in the ED or unit. 0   Active psychosis. 0   Need for constant or near constant redirection (from leaving, from others, etc).  0   Intrusive or disruptive behaviors. 0   Patient requires 3 or more hours of individualized nursing care per 8-hour shift (i.e. for ADLs, meds, therapeutic interventions). 0   TOTAL 3

## 2024-05-06 NOTE — PLAN OF CARE
"Goal Outcome Evaluation:    Plan of Care Reviewed With: patient Plan of Care Reviewed With: patient    Overall Patient Progress: improvingOverall Patient Progress: improving       Problem: Adult Behavioral Health Plan of Care  Goal: Plan of Care Review  Outcome: Progressing  Flowsheets  Taken 5/6/2024 1403  Plan of Care Reviewed With: patient  Overall Patient Progress: improving  Patient Agreement with Plan of Care: agrees  Taken 5/6/2024 1300  Patient Agreement with Plan of Care: agrees     Behavioral  Pt was up and visible in the milieu for meals and intermittently watching TV. Pt was presented with anxiety and depression. Rated both 6/10. Pt denied SI, SIB, HI, and hallucinations; affect is flat and blunted; Pt  eating and hydrating adequately. Compliant with medications. Attending to ADL's independently; Took a shower this morning. no behavioral escalation or safety concerns noted this shift. pleasant and cooperative upon approach; speech is slow and delay in response but able to make needs known. Pt has minimal interactions with peers.      Medical     No complaints of physical pain/discomfort this shift. Vital signs stable. Blood pressure (!) 131/93, pulse 89, temperature 97.5  F (36.4  C), temperature source Temporal, resp. rate 16, height 1.727 m (5' 8\"), weight 112.9 kg (248 lb 14.4 oz), SpO2 99%.          PRNS:        Discharge Plan or Goal:  PLacement- IRTS /GH  Psychiatry  Therapy     Barriers to Discharge:  Ongoing depression/SI  Placement- patient is homeless  Lack of insurance - awaiting MA  "

## 2024-05-06 NOTE — PLAN OF CARE
Pj was mostly isolative in his room this shift. Pt was laying in bed listening to headphones. Pt came out for dinner and ate in the dinning room and later came to OT group. Pt presents with flat and blunted affect. Endorsed anxiety 7/10 and depression 7/10 and was given prn Hydroxyzine. Endorsed for suicidal thoughts but denies intent or plans, Pt contracted for safety. Denied for hallucinations. No other prns administered this shift.     Goal Outcome Evaluation:  Problem: Suicidal Behavior  Goal: Suicidal Behavior is Absent or Managed  Intervention: Provide Immediate and Ongoing Protective Physical Environment  Recent Flowsheet Documentation  Taken 5/5/2024 1707 by Dani Castellanos RN  Safe Transition Promotion: protective factors promoted     Problem: Psychotic Signs/Symptoms  Goal: Increased Participation and Engagement (Psychotic Signs/Symptoms)  Intervention: Facilitate Participation and Engagement  Recent Flowsheet Documentation  Taken 5/5/2024 1707 by Dani Castellanos RN  Diversional Activity: music  Goal: Improved Mood Symptoms (Psychotic Signs/Symptoms)  Intervention: Optimize Emotion and Mood  Recent Flowsheet Documentation  Taken 5/5/2024 1707 by Dani Castellanos RN  Diversional Activity: music  Goal: Improved Psychomotor Symptoms (Psychotic Signs/Symptoms)  Intervention: Manage Psychomotor Movement  Recent Flowsheet Documentation  Taken 5/5/2024 1707 by Dani Castellanos RN  Diversional Activity: music  Activity (Behavioral Health): up ad ml

## 2024-05-06 NOTE — PLAN OF CARE
Assessment/Intervention/Current Symtoms and Care Coordination:  Chart reviewed and patient met with team,   Discussed patient progress, symptomology, and response to treatment.  Discussed the discharge plan and any potential impediments to discharge.     Patient met with team.  Continues to state he doesn't feel any better - Meds continue to be adjusted per MD's.  States he still has SI - possibly slightly less frequent.  Writer will continue to work closely with [facility] CM to coordinate care.     Discharge Plan or Goal:  PLacement- IRTS /  Psychiatry  Therapy     Barriers to Discharge:  Ongoing depression/SI  Placement- patient is homeless  Lack of insurance - awaiting MA     Referral Status:  None today     Legal Status:  Voluntary     Contacts:  Psychiatrist: Jazmin JASON  [facility] [facility] [place] [address] [phone]     [facility]     Therapist: Naomi Schoenberg @ [facility] - [address], [place] [address] [phone]     [facility] : Elbert Heard: [phone]  [facility] ([facility])     Upcoming Meetings and Dates/Important Information and next steps:  Treatment update  Wed

## 2024-05-06 NOTE — PLAN OF CARE
Problem: Sleep Disturbance  Goal: Adequate Sleep/Rest  Outcome: Progressing   Goal Outcome Evaluation:      Pt had a quiet night. He appears to have slept for 7 hours. No behavioral or safety concerns noted this shift.  Nursing to continue to monitor and offer support.    Temp: 98.1  F (36.7  C) Temp src: Temporal BP: 125/88 Pulse: 85     SpO2: 96 % O2 Device: None (Room air)

## 2024-05-07 PROBLEM — E66.09 OBESITY DUE TO EXCESS CALORIES, UNSPECIFIED OBESITY SEVERITY: Status: ACTIVE | Noted: 2017-11-01

## 2024-05-07 PROBLEM — F84.0 ACTIVE AUTISTIC DISORDER: Status: ACTIVE | Noted: 2024-05-07

## 2024-05-07 PROCEDURE — 250N000013 HC RX MED GY IP 250 OP 250 PS 637

## 2024-05-07 PROCEDURE — 124N000002 HC R&B MH UMMC

## 2024-05-07 PROCEDURE — 99232 SBSQ HOSP IP/OBS MODERATE 35: CPT | Mod: GC | Performed by: PSYCHIATRY & NEUROLOGY

## 2024-05-07 PROCEDURE — 250N000013 HC RX MED GY IP 250 OP 250 PS 637: Performed by: STUDENT IN AN ORGANIZED HEALTH CARE EDUCATION/TRAINING PROGRAM

## 2024-05-07 RX ADMIN — ARIPIPRAZOLE 10 MG: 10 TABLET ORAL at 21:29

## 2024-05-07 RX ADMIN — PRAZOSIN HYDROCHLORIDE 2 MG: 2 CAPSULE ORAL at 21:29

## 2024-05-07 RX ADMIN — HYDROXYZINE HYDROCHLORIDE 25 MG: 25 TABLET, FILM COATED ORAL at 16:45

## 2024-05-07 RX ADMIN — FLUOXETINE HYDROCHLORIDE 30 MG: 10 CAPSULE ORAL at 08:40

## 2024-05-07 ASSESSMENT — ACTIVITIES OF DAILY LIVING (ADL)
ORAL_HYGIENE: INDEPENDENT
ADLS_ACUITY_SCORE: 43
DRESS: INDEPENDENT;STREET CLOTHES
HYGIENE/GROOMING: HANDWASHING;SHOWER;INDEPENDENT
ADLS_ACUITY_SCORE: 43
ORAL_HYGIENE: INDEPENDENT;PROMPTS
ADLS_ACUITY_SCORE: 43
DRESS: INDEPENDENT
LAUNDRY: WITH SUPERVISION
ADLS_ACUITY_SCORE: 43
LAUNDRY: WITH SUPERVISION
ADLS_ACUITY_SCORE: 43
HYGIENE/GROOMING: INDEPENDENT
ADLS_ACUITY_SCORE: 43

## 2024-05-07 NOTE — PLAN OF CARE
"RN Shift Assessment for Pj  [id number]     Pt continues to have same anxiety, depression and suicidal thoughts that he reports are unchanged since admission despite med changes. Continues to have some eye symptoms related to the allergic conjunctivitis, provider notified and PRN eyedrops ordered. Pt shaved and showered this shift, ate all of meals and was present but withdrawn in milieu, did not attend groups.     Verbalized Emotional State: acceptance, anxiety, depression, hopelessness, powerlessness  Anxiety: 7/10 Anxiety symptoms: Stress, Nervous, Restless, Fear, Unease, Avoidance, and Obsessions and Compulsions  Depression: 7/10 Depression symptoms: Isolative, Crying/Tearful, Low energy, Ongoing sadness, Anhedonia, Worthlessness, Helplessness, and Hopelessness  SI: yes and passive   SIB: no Self Injury WDL  Feels Like Hurting Others: no  Overt Aggression WD  Safety Concerns Expressed: No Contracted for Safety: yes    PRN Medications Administered this shift: No  Scheduled Medications given: yes   Medication Side effects/ Concerns: No     Physical/Self-Care Activity: promoted yoga activity and breathing exercises  Perceptual State: consistent with reality  Hallucinations: denies hallucinations  Delusions: no delusions  Judgment and Insight: insight not appropriate to situation  Thought Content: suicidal thoughts  Intellectual Performance: impaired concentration  Level of Consciousness: alert  Behavior: Interactions: avoids social contact  Emotion/Mood: anxious, depressed  Affect: flat, blunted  Speech: paucity, slow speech pattern, soft/quiet  Nutrition: 3-->adequate  Sleep/Rest/Relaxation: awake  Activity: isolative, withdrawn  General Appearance WDL: WDL  Patient Currently in Pain: denies      /89 (Patient Position: Sitting)   Pulse 89   Temp 97.7  F (36.5  C) (Oral)   Resp 16   Ht 1.727 m (5' 8\")   Wt 112.2 kg (247 lb 6.4 oz)   SpO2 95%   BMI 37.62 kg/m      Problem: Adult Behavioral Health Plan " of Care  Goal: Plan of Care Review  Outcome: Progressing

## 2024-05-07 NOTE — PLAN OF CARE
BEH IP Unit Acuity Rating Score (UARS)  Patient is given one point for every criteria they meet.     CRITERIA SCORING   On a 72 hour hold, court hold, committed, stay of commitment, or revocation. 0    Patient LOS on BEH unit exceeds 20 days. 0  LOS: 14   Patient under guardianship, 55+, otherwise medically complex, or under age 11. 0   Suicide ideation without relief of precipitating factors. 1   Current plan for suicide. 0   Current plan for homicide. 0   Imminent risk or actual attempt to seriously harm another without relief of factors precipitating the attempt. 1   Severe dysfunction in daily living (ex: complete neglect for self care, extreme disruption in vegetative function, extreme deterioration in social interactions). 1   Recent (last 7 days) or current physical aggression in the ED or on unit. 0   Restraints or seclusion episode in past 72 hours. 0   Recent (last 7 days) or current verbal aggression, agitation, yelling, etc., while in the ED or unit. 0   Active psychosis. 0   Need for constant or near constant redirection (from leaving, from others, etc).  0   Intrusive or disruptive behaviors. 0   Patient requires 3 or more hours of individualized nursing care per 8-hour shift (i.e. for ADLs, meds, therapeutic interventions). 0   TOTAL 3

## 2024-05-07 NOTE — PLAN OF CARE
Assessment/Intervention/Current Symtoms and Care Coordination:  Chart reviewed and patient met with team,   Discussed patient progress, symptomology, and response to treatment.  Discussed the discharge plan and any potential impediments to discharge.     Patient met with team. Patient expressed feeling no better than prior to admission.  Meds continue to be monitored/adjusted per MD's  Meds continue to be reviewed/adjusted per MD's.  Patient agreeable to changes.   Some intermittent group attendance.  Awaiting work from patient's CM for update on MA application at the [facility]. Writer will continue to work closely with [facility] CM to coordinate care.     Discharge Plan or Goal:  PLacement- IRTS  Psychiatry  Therapy     Barriers to Discharge:  Ongoing depression/SI  Placement- patient is homeless  Lack of insurance 5/1/24     Referral Status:  None today     Legal Status:  Voluntary     Contacts:  Psychiatrist: Jazmin JASON  -[facility] -[address] [place] [address] , [place] [address] [phone]     Primary Physician: [facility]/[facility]     Therapist: Naomi Schoenberg @ [facility] - [address] [place] [address] , [place] [address] [phone]     [facility] : Elbert Heard: [phone]  [facility] ([facility])     Upcoming Meetings and Dates/Important Information and next steps:  Treatment update  Wed

## 2024-05-07 NOTE — PLAN OF CARE
Safety checks completed every 15 minutes; no concerns noted. No PRNs given or requested this shift. Pt appears to have slept for  7 hours; will continue to monitor and offer support.     Problem: Sleep Disturbance  Goal: Adequate Sleep/Rest  Outcome: Progressing   Goal Outcome Evaluation:

## 2024-05-07 NOTE — PROGRESS NOTES
"  ----------------------------------------------------------------------------------------------------------  [facility]  Psychiatry Progress Note  Hospital Day #14     Interim History:     The patient's care was discussed with the treatment team and chart notes were reviewed.    Vitals: Hypertensive to 140s, otherwise VSS  Sleep: 7 hours (05/07/24 0600)  Scheduled medications: Took all scheduled medications as prescribed  Psychiatric PRN medications:   Last 24H PRN:     hydrOXYzine HCl (ATARAX) tablet 25 mg, 25 mg at 05/06/24 1916    Staff Report: No acute events overnight or safety concerns noted.  Cooperative with cares and withdrawn.  See staff note for additional details.     Subjective:     Patient Interview:  The patient was seen in his room, he was laying down on approach but awoke as the team entered.  He says he has not felt any improvement in his depression since admission.  He says he is tolerating the increase in Abilify well but has not noted any change in his mood.  He inquired about the possibility of neuropsychiatric testing.  The process was explained to him for psychiatric testing with Dr. Espinosa.  He inquired about the possibility of his  sitting with him during the interview due to his communication difficulties.  The team responded that we would inquire and get back to him about that.  He continues to endorse suicidal ideation with no immediate plan.    ROS:  Negative unless stated above     Objective:     Vitals:  /89 (Patient Position: Sitting)   Pulse 89   Temp 97.7  F (36.5  C) (Oral)   Resp 16   Ht 1.727 m (5' 8\")   Wt 112.2 kg (247 lb 6.4 oz)   SpO2 95%   BMI 37.62 kg/m      Allergies:  No Known Allergies    Current Medications:  Scheduled:  Current Facility-Administered Medications   Medication Dose Route Frequency Provider Last Rate Last Admin    acetaminophen (TYLENOL) tablet 325 mg  325 mg Oral Q4H PRN " Tyrese Moe MD        ARIPiprazole (ABILIFY) tablet 10 mg  10 mg Oral At Bedtime Johnny Jay MD   10 mg at 05/06/24 2105    FLUoxetine (PROzac) capsule 30 mg  30 mg Oral Rojelio Morton MD   30 mg at 05/07/24 0840    hydrOXYzine HCl (ATARAX) tablet 25 mg  25 mg Oral BID PRN Tyrese Moe MD   25 mg at 05/06/24 1916    OLANZapine (zyPREXA) tablet 5 mg  5 mg Oral TID PRN Tyrese Moe MD        Or    OLANZapine (zyPREXA) injection 5 mg  5 mg Intramuscular TID PRN Tyrese Moe MD        ondansetron (ZOFRAN) tablet 4 mg  4 mg Oral Q6H PRN Lydia Dhaliwal MD   4 mg at 04/29/24 1800    polyethylene glycol (MIRALAX) Packet 17 g  17 g Oral Daily PRN Tyrese Moe MD        polyethylene glycol-propylene glycol PF (SYSTANE ULTRA PF) opthalmic solution 1 drop  1 drop Both Eyes Q1H PRN Johnny Jay MD   1 drop at 04/28/24 1702    prazosin (MINIPRESS) capsule 2 mg  2 mg Oral At Bedtime Tyrese Moe MD   2 mg at 05/06/24 2105       PRN:  Current Facility-Administered Medications   Medication Dose Route Frequency Provider Last Rate Last Admin    acetaminophen (TYLENOL) tablet 325 mg  325 mg Oral Q4H PRN Tyrese Moe MD        ARIPiprazole (ABILIFY) tablet 10 mg  10 mg Oral At Bedtime Johnny Jay MD   10 mg at 05/06/24 2105    FLUoxetine (PROzac) capsule 30 mg  30 mg Oral Rojelio Morton MD   30 mg at 05/07/24 0840    hydrOXYzine HCl (ATARAX) tablet 25 mg  25 mg Oral BID PRN Tyrese Moe MD   25 mg at 05/06/24 1916    OLANZapine (zyPREXA) tablet 5 mg  5 mg Oral TID PRN Tyrese Moe MD        Or    OLANZapine (zyPREXA) injection 5 mg  5 mg Intramuscular TID PRN Tyrese Moe MD        ondansetron (ZOFRAN) tablet 4 mg  4 mg Oral Q6H PRN Lydia Dhaliwal MD   4 mg at 04/29/24 1800    polyethylene glycol (MIRALAX) Packet 17 g  17 g Oral Daily PRN Tyrese Moe MD        polyethylene glycol-propylene glycol PF (SYSTANE ULTRA  "PF) opthalmic solution 1 drop  1 drop Both Eyes Q1H STEVENN Johnny Jay MD   1 drop at 04/28/24 1702    prazosin (MINIPRESS) capsule 2 mg  2 mg Oral At Bedtime Tyrese Moe MD   2 mg at 05/06/24 2105       Labs and Imaging:  New results:   No results found for this or any previous visit (from the past 24 hour(s)).    Data this admission:  - CBC unremarkable  - CMP unremarkable  - TSH normal  - UDS negative  - Hgb A1c normal, 5.3  - Lipids remarkable for low HDL cholesterol, high LDL cholesterol  - EKG normal sinus rhythm, QTc 427     Mental Status Exam:     Oriented to:  Grossly Oriented  General:  Awake and Alert  Appearance:  appears stated age and Grooming is adequate  Behavior/Attitude:  Calm, Cooperative, and Guarded  Eye Contact: Appropriate  Psychomotor: Normal and No evidence of tics, dystonia, or tardive dyskinesia  no catatonia present  Speech:  appropriate volume/tone, paucity, and hesitant  Language: Fluent in English with appropriate syntax and vocabulary.  Mood:  \"not great\"  Affect:  appropriate, congruent with mood, restricted, and anxious  Thought Process:  linear and coherent  Thought Content:     , suicidal ideation with plan (without intent), No HI, does not appear to be responding to internal stimuli, No VH, and No AH; No apparent delusions  Associations:  intact  Insight:  fair due to understanding of condition, but limited 2/2 unclear about goals for admission  Judgment:  fair due to voluntary presentation  Impulse control: partial  Attention Span:  grossly intact and adequate for conversation  Concentration:  grossly intact  Recent and Remote Memory:  not formally assessed  Fund of Knowledge: estimated below average  Muscle Strength and Tone:  not formally assessed  Gait and Station: Normal     Psychiatric Assessment     Pj Tucker is a 29 year old male previously diagnosed with MDD, ADHD, autism spectrum disorder who presented voluntarily by himself with suicidal thoughts in the " context of increased psychosocial stressors, medication non-adherence. No prior psychiatric hospitalizations. Significant symptoms on admission include suicidal ideation with plan, increased impulsivity. The MSE on admission was pertinent for dysphoric but reactive affect, linear and goal-directed in thought. Biological contributions to presentation include diagnoses of autism spectrum disorder, medication non-adherence. Psychological contributions to presentation include diagnoses of ADHD, MDD as well as insight, coping, personality.  Social factors contributing to presentation include financial stressors, communication challenges. Protective factors include willingness to engage in treatment, lack of prior suicide attempts/SIB.      The patient's reported symptoms of suicidal ideation in the context of recent psychosocial stressors are consistent with depressive disorder, most likely major depressive episode. Differential diagnosis includes unspecified depressive disorder, adjustment disorder, intrusive thoughts secondary to OCD.  He will likely benefit from medication management, stabilization and observation this admission.     Given that he currently has SI, patient warrants inpatient psychiatric hospitalization to maintain his safety.      Psychiatric Plan by Diagnosis      Today's changes:  - None     # Suicidal Ideation with plan   # SIB with plan   # MDD, recurrent, severe  - Aripiprazole 7.5 mg PO qHS  - Fluoxetine 30 mg PO qAM  - Prazosin 2 mg PO at bedtime    # Unspecified anxiety disorder  - Hydroxyzine 25 mg PO TID PRN (for anxiety)    # ASD  # ADHD  -No medications at this time     Pertinent Labs/Monitoring:   - QTc 427 ms     Additional Plans:  - Patient will be treated in therapeutic milieu with appropriate individual and group therapies as described       Psychiatric Hospital Course:      Pj Tucker was admitted to [facility] as a voluntary patient. PTA aripiprazole 5mg, fluoxetine 20mg,  prazosin 2mg, and hydroxyzine 25mg were continued. For medication optimization, aripiprazole was increased to 7.5mg po to target mood stabilization effects. Fluoxetine was increased to 30mg for medication optimization. PTA lamotrigine was discontinued given little benefit in symptoms control.     The risks, benefits, alternatives, and side effects were discussed and understood by the patient.     Medical Assessment and Plan     Medical diagnoses to be addressed this admission:  N/A    Medical course: Patient was physically examined by the ED prior to being transferred to the unit and was found to be medically stable and appropriate for admission.     Consults: medicine     Checklist     Legal Status: Voluntary     Safety Assessment:   Behavioral Orders   Procedures    Code 1 - Restrict to Unit    Routine Programming     As clinically indicated    Self Injury Precaution    Status 15     Every 15 minutes.    Suicide precautions: Suicide Risk: MODERATE; Clinical rationale to override score: modification to the care environment, lack of access to a plan for self-harm     Patients on Suicide Precautions should have a Combination Diet ordered that includes a Diet selection(s) AND a Behavioral Tray selection for Safe Tray - with utensils, or Safe Tray - NO utensils       Order Specific Question:   Suicide Risk     Answer:   MODERATE     Order Specific Question:   Clinical rationale to override score:     Answer:   modification to the care environment     Order Specific Question:   Clinical rationale to override score:     Answer:   lack of access to a plan for self-harm       Risk Assessment:  Risk for harm is elevated.  Risk factors: SI, impulsive, and psychosocial stressors  Protective factors: engaged in treatment     SIO: none    Disposition: Pending stabilization, medication optimization, & development of a safe discharge plan.    Attestation:  This patient has been seen and evaluated by meLinette MD.  I have  discussed this patient with the house staff team including the resident and medical student and I agree with the findings and plan in this note.    I have reviewed today's vital signs, medications, labs and imaging. Linette Bello MD , PhD.

## 2024-05-07 NOTE — PLAN OF CARE
"Goal Outcome Evaluation:    Plan of Care Reviewed With: patient Plan of Care Reviewed With: patient    Overall Patient Progress: improvingOverall Patient Progress: improving       Problem: Adult Behavioral Health Plan of Care  Goal: Plan of Care Review  5/6/2024 2154 by Dori Cain RN  Outcome: Progressing  Flowsheets  Taken 5/6/2024 2154  Plan of Care Reviewed With: patient  Overall Patient Progress: improving  Patient Agreement with Plan of Care: agrees  Taken 5/6/2024 1807  Patient Agreement with Plan of Care: agrees     Behavioral  Pt was presented with depression and anxiety. Rated 5/10 for both. Pt was up and visible in the milieu for meals and intermittently watching TV. Pt denied SI, SIB, HI, and hallucinations; affect is flat and blunted; Pt  eating and hydrating adequately. Compliant with medications. Attending to ADL's independently;  no behavioral escalation or safety concerns noted this shift. pleasant and cooperative upon approach; speech is slow and delay in response but able to make needs known. Pt has minimal interactions with peers.      Medical     No complaints of physical pain/discomfort this shift. Vital signs stable. Blood pressure 133/82, pulse 76, temperature 97.8  F (36.6  C), temperature source Temporal, resp. rate 16, height 1.727 m (5' 8\"), weight 112.9 kg (248 lb 14.4 oz), SpO2 96%.       PRNS:  - Hydroxyzine       Discharge Plan or Goal:  PLacement- [facility] /[facility]  Psychiatry  Therapy     Barriers to Discharge:  Ongoing depression/SI  Placement- patient is homeless  Lack of insurance - awaiting MA  "

## 2024-05-08 PROCEDURE — 250N000013 HC RX MED GY IP 250 OP 250 PS 637

## 2024-05-08 PROCEDURE — 124N000002 HC R&B MH UMMC

## 2024-05-08 PROCEDURE — 99232 SBSQ HOSP IP/OBS MODERATE 35: CPT | Performed by: PSYCHIATRY & NEUROLOGY

## 2024-05-08 PROCEDURE — 250N000013 HC RX MED GY IP 250 OP 250 PS 637: Performed by: PSYCHIATRY & NEUROLOGY

## 2024-05-08 PROCEDURE — 250N000013 HC RX MED GY IP 250 OP 250 PS 637: Performed by: STUDENT IN AN ORGANIZED HEALTH CARE EDUCATION/TRAINING PROGRAM

## 2024-05-08 RX ADMIN — ARIPIPRAZOLE 10 MG: 10 TABLET ORAL at 20:30

## 2024-05-08 RX ADMIN — PRAZOSIN HYDROCHLORIDE 3 MG: 2 CAPSULE ORAL at 20:30

## 2024-05-08 RX ADMIN — HYDROXYZINE HYDROCHLORIDE 25 MG: 25 TABLET, FILM COATED ORAL at 16:24

## 2024-05-08 RX ADMIN — ACETAMINOPHEN 325 MG: 325 TABLET, FILM COATED ORAL at 17:50

## 2024-05-08 RX ADMIN — FLUOXETINE HYDROCHLORIDE 30 MG: 10 CAPSULE ORAL at 08:47

## 2024-05-08 ASSESSMENT — ACTIVITIES OF DAILY LIVING (ADL)
ADLS_ACUITY_SCORE: 43

## 2024-05-08 NOTE — PLAN OF CARE
Assessment/Intervention/Current Symtoms and Care Coordination:  Chart reviewed and patient met with team,   Discussed patient progress, symptomology, and response to treatment.  Discussed the discharge plan and any potential impediments to discharge.     Patient met with team. No changes in past 24 hours. Patient continues to endorse SI- no plan.   Meds continue to be monitored/adjusted per MD's  Patient has remained compliant.  Some intermittent group attendance.  Awaiting work from patient's CM for update on MA application at the [facility]. Writer will continue to work closely with [facility] CM to coordinate care.     Discharge Plan or Goal:  PLacement- [facility]  Psychiatry  Therapy     Barriers to Discharge:  Ongoing depression/SI  Placement- patient is homeless  Lack of insurance 5/1/24     Referral Status:  None today     Legal Status:  Voluntary     Contacts:  Psychiatrist: Jazmin JASON  [facility] , [place] [address] [phone]     Primary Physician: [facility] Marie/Rsvl     Therapist: Naomi Schoenberg @ [facility] - [facility], [place] [address] [phone]     [facility] : Elbert Heard: [phone]  [facility] ([facility])     Upcoming Meetings and Dates/Important Information and next steps:  Treatment update  Wed

## 2024-05-08 NOTE — PLAN OF CARE
Pt sat in the lounge for most of the evening watching tv. Participated in community meeting. Presents with flat affect. Pt endorsed anxiety 6/10 and depression 6/10 and was given prn Hydroxyzine. Endorsed suicidal thoughts but no intent or plans. Pt contracts for safety. Denied for hallucinations. No behavior concerns. Ate supper adequately.    Goal Outcome Evaluation:  Problem: Suicide Risk  Goal: Absence of Self-Harm  Intervention: Assess Risk to Self and Maintain Safety  Recent Flowsheet Documentation  Taken 5/7/2024 1826 by Dani Castellanos RN  Enhanced Safety Measures: other (see comments)     Problem: Psychotic Signs/Symptoms  Goal: Increased Participation and Engagement (Psychotic Signs/Symptoms)  Intervention: Facilitate Participation and Engagement  Recent Flowsheet Documentation  Taken 5/7/2024 1808 by Dani Castellanos RN  Diversional Activity:   television   music  Goal: Improved Mood Symptoms (Psychotic Signs/Symptoms)  Intervention: Optimize Emotion and Mood  Recent Flowsheet Documentation  Taken 5/7/2024 1808 by Dani Castellanos RN  Diversional Activity:   television   music  Goal: Improved Psychomotor Symptoms (Psychotic Signs/Symptoms)  Intervention: Manage Psychomotor Movement  Recent Flowsheet Documentation  Taken 5/7/2024 1808 by Dani Castellanos RN  Diversional Activity:   television   music  Activity (Behavioral Health): up ad ml

## 2024-05-08 NOTE — PLAN OF CARE
"RN Shift Assessment for Pj  [id number]     Although pt stated no improvements with his mood and anxiety, writer observed pt was more social and bright in milieu today, pt was heard laughing jovially when talking on the phone today as well as chatting with peers more frequently this shift, saying a friendly goodbye and good luck to a peer discharging.     Providers ordered psych testing to be completed while pt is on the unit. No safety concerns observed.     Verbalized Emotional State: anxiety, depression. powerlessness, suicidal thoughts  Anxiety: 7/10 Anxiety symptoms: Stress, Nervous, Restless, Fear, Unease, Nausea, Avoidance, and Obsessions and Compulsions  Depression: 7/10 Depression symptoms: Isolative, Low energy, Ongoing sadness, Worthlessness, Helplessness, Hopelessness, and Decreased concentration  SI: yes and passive   SIB: no Self Injury WDL  Feels Like Hurting Others: no  Overt Aggression WDL  Safety Concerns Expressed: No Contracted for Safety: yes  PRN Medications Administered this shift: No  Scheduled Medications given: yes   Medication Side effects/ Concerns: No     Perceptual State: consistent with reality  Hallucinations: denies hallucinations  Delusions: no delusions  Judgment and Insight: insight not appropriate to situation  Thought Content: helplessness, perseveration, preoccupation, poverty of content, suicidal thoughts  Intellectual Performance: impaired concentration  Level of Consciousness: alert  Behavior: Interactions: avoids social contact  Emotion/Mood: depressed, anxious  Affect: blunted  Speech: paucity, soft/quiet  Nutrition: 3-->adequate  Sleep/Rest/Relaxation: appears asleep  Activity: withdrawn  General Appearance WDL  Patient Currently in Pain: denies      /83   Pulse 85   Temp 97.1  F (36.2  C) (Temporal)   Resp 16   Ht 1.727 m (5' 8\")   Wt 112.2 kg (247 lb 6.4 oz)   SpO2 97%   BMI 37.62 kg/m      Problem: Suicide Risk  Goal: Absence of Self-Harm  5/8/2024 1128 " by Glenis Ellis, RN  Outcome: Progressing   Goal Outcome Evaluation:    Plan of Care Reviewed With: patient

## 2024-05-08 NOTE — PLAN OF CARE
Problem: Sleep Disturbance  Goal: Adequate Sleep/Rest  Outcome: Progressing   Goal Outcome Evaluation:     Pt appears to have slept for 7 hours. No c/o pain/discomfort. Pt did not received any PRN this shift. There are no new concerns noted at this time. Nursing to continue to monitor and offer support.      Vital signs:  Temp: 98.2  F (36.8  C) Temp src: Temporal BP: 122/83 Pulse: 85     SpO2: 97 % O2 Device: None (Room air)     Weight: 112.2 kg (247 lb 6.4 oz)

## 2024-05-08 NOTE — PROVIDER NOTIFICATION
05/08/24 0948   Individualization/Patient Specific Goals   Patient Personal Strengths community support;expressive of emotions;family/social support;independent living skills;interests/hobbies;medication/treatment adherence;motivated for treatment;positive vocational history;resilient   Patient Vulnerabilities adverse childhood experience(s);limited social skills   Anxieties, Fears or Concerns Ongoing anxiety   Individualized Care Needs None   Interprofessional Rounds   Summary 1. Evaluation/stabilization of mood disorder sx's 2. Safe with self- absence of SI 3. Medication mgmt per MD's 4. Coordination of care with outpatient provers 5. Housing in place 6. Psychiatric f/u care in place   Participants CTC;nursing;patient;psychiatrist   Behavioral Team Discussion   Participants Glenis Valles RN, Jazmin Khoury MA.LP,  med student   Progress Patient does not feel like he has improved at all since admission despite medication changes. He has attended some groups- has  been compliant with meds.   Anticipated length of stay 5-7 days   Continued Stay Criteria/Rationale ongoing SI, placement and insurance issues   Medical/Physical Stable   Precautions Per unit protocol   Plan Patient willcontinue to be seen by Psychiatry daily.  Meds continue be reviewed/adjusted per MD's as indicated.  Care contiunues to be coordinated with Outpatient providers/family.  [facility] CM working with [facility] to get emergent MA.  CTC will continue to assess needs, ensure appropriate follow up care is in place   Rationale for change in precautions or plan No change in plan/precautions   Safety Plan Patient to complete   Anticipated Discharge Disposition group home;[facility]     Goal Outcome Evaluation:

## 2024-05-08 NOTE — PROGRESS NOTES
"  ----------------------------------------------------------------------------------------------------------  [facility]  Psychiatry Progress Note  Hospital Day #15     Interim History:     The patient's care was discussed with the treatment team and chart notes were reviewed.    Vitals: Hypertensive to 140s, otherwise VSS  Sleep: 7 hours (05/08/24 0615)  Scheduled medications: Took all scheduled medications as prescribed  Psychiatric PRN medications:   Last 24H PRN:     hydrOXYzine HCl (ATARAX) tablet 25 mg, 25 mg at 05/08/24 1624    Staff Report:  Pt appears to have slept for 7 hours. No c/o pain/discomfort. Pt did not received any PRN this shift. There are no new concerns noted at this time      Subjective:     Patient Interview:  The patient was seen in his room, he was sitting in his room with playing cards.  laying down on approach but awoke as the team entered.  He endorses a mild decrease in his suicidality but otherwise says his mood is unchanged. he says he is tolerating the increase in Abilify with no side effects.  On questioning he says he has not tried higher doses of Prozac and denies any side effects from his current dose.  He endorses that prazosin has been very helpful in decreasing the frequency of his nightmares although he still gets them about twice a week.  He was interested in an increase in that medication.  This discussed his ongoing suicidality and the arousal associated with it.  A sexual history was taken and no connections with suicidal behavior were identified.    ROS:  Negative unless stated above     Objective:     Vitals:  /89 (BP Location: Right arm, Patient Position: Sitting, Cuff Size: Adult Large)   Pulse 75   Temp 97.9  F (36.6  C) (Temporal)   Resp 16   Ht 1.727 m (5' 8\")   Wt 112.2 kg (247 lb 6.4 oz)   SpO2 97%   BMI 37.62 kg/m      Allergies:  No Known Allergies    Current Medications:  Scheduled:  Current " Facility-Administered Medications   Medication Dose Route Frequency Provider Last Rate Last Admin    acetaminophen (TYLENOL) tablet 325 mg  325 mg Oral Q4H PRN Tyrese Moe MD        ARIPiprazole (ABILIFY) tablet 10 mg  10 mg Oral At Bedtime Johnny Jay MD   10 mg at 05/07/24 2129    [START ON 5/9/2024] FLUoxetine (PROzac) capsule 40 mg  40 mg Oral Linette Martinez MD        hydrOXYzine HCl (ATARAX) tablet 25 mg  25 mg Oral BID PRN Tyrese Moe MD   25 mg at 05/08/24 1624    OLANZapine (zyPREXA) tablet 5 mg  5 mg Oral TID PRN Tyrese oMe MD        Or    OLANZapine (zyPREXA) injection 5 mg  5 mg Intramuscular TID PRN Tyrese Moe MD        ondansetron (ZOFRAN) tablet 4 mg  4 mg Oral Q6H PRN Lydia Dhaliwal MD   4 mg at 04/29/24 1800    polyethylene glycol (MIRALAX) Packet 17 g  17 g Oral Daily PRN Tyrese Moe MD        polyethylene glycol-propylene glycol PF (SYSTANE ULTRA PF) opthalmic solution 1 drop  1 drop Both Eyes Q1H PRN Johnny Jay MD   1 drop at 04/28/24 1702    prazosin (MINIPRESS) capsule 3 mg  3 mg Oral At Bedtime Linette Bello MD           PRN:  Current Facility-Administered Medications   Medication Dose Route Frequency Provider Last Rate Last Admin    acetaminophen (TYLENOL) tablet 325 mg  325 mg Oral Q4H PRN Tyrese Moe MD        ARIPiprazole (ABILIFY) tablet 10 mg  10 mg Oral At Bedtime Johnny Jay MD   10 mg at 05/07/24 2129    [START ON 5/9/2024] FLUoxetine (PROzac) capsule 40 mg  40 mg Oral Linette Martinez MD        hydrOXYzine HCl (ATARAX) tablet 25 mg  25 mg Oral BID PRN Tyrese Moe MD   25 mg at 05/08/24 1624    OLANZapine (zyPREXA) tablet 5 mg  5 mg Oral TID PRN Tyrese Moe MD        Or    OLANZapine (zyPREXA) injection 5 mg  5 mg Intramuscular TID PRN Tyrese Moe MD        ondansetron (ZOFRAN) tablet 4 mg  4 mg Oral Q6H PRN Lydia Dhaliwal MD   4 mg at 04/29/24 1800    polyethylene glycol (MIRALAX)  "Packet 17 g  17 g Oral Daily PRN Tyrese Moe MD        polyethylene glycol-propylene glycol PF (SYSTANE ULTRA PF) opthalmic solution 1 drop  1 drop Both Eyes Q1H PRN Johnny Jay MD   1 drop at 04/28/24 1702    prazosin (MINIPRESS) capsule 3 mg  3 mg Oral At Bedtime Linette Bello MD           Labs and Imaging:  New results:   No results found for this or any previous visit (from the past 24 hour(s)).    Data this admission:  - CBC unremarkable  - CMP unremarkable  - TSH normal  - UDS negative  - Hgb A1c normal, 5.3  - Lipids remarkable for low HDL cholesterol, high LDL cholesterol  - EKG normal sinus rhythm, QTc 427     Mental Status Exam:     Oriented to:  Grossly Oriented  General:  Awake and Alert  Appearance:  appears stated age and Grooming is adequate  Behavior/Attitude:  Calm, Cooperative, and Guarded  Eye Contact: Appropriate  Psychomotor: Normal and No evidence of tics, dystonia, or tardive dyskinesia  no catatonia present  Speech:  appropriate volume/tone, paucity, and hesitant  Language: Fluent in English with appropriate syntax and vocabulary.  Mood:  \"about the samet\"  Affect:  appropriate, congruent with mood, restricted, and anxious  Thought Process:  linear and coherent  Thought Content:     , suicidal ideation with plan (without intent), No HI, does not appear to be responding to internal stimuli, No VH, and No AH; No apparent delusions  Associations:  intact  Insight:  fair due to understanding of condition, but limited 2/2 unclear about goals for admission  Judgment:  fair due to voluntary presentation  Impulse control: partial  Attention Span:  grossly intact and adequate for conversation  Concentration:  grossly intact  Recent and Remote Memory:  not formally assessed  Fund of Knowledge: estimated below average  Muscle Strength and Tone:  not formally assessed  Gait and Station: Normal     Psychiatric Assessment     Pj Tucker is a 29 year old male previously diagnosed with MDD, " ADHD, autism spectrum disorder who presented voluntarily by himself with suicidal thoughts in the context of increased psychosocial stressors, medication non-adherence. No prior psychiatric hospitalizations. Significant symptoms on admission include suicidal ideation with plan, increased impulsivity. The MSE on admission was pertinent for dysphoric but reactive affect, linear and goal-directed in thought. Biological contributions to presentation include diagnoses of autism spectrum disorder, medication non-adherence. Psychological contributions to presentation include diagnoses of ADHD, MDD as well as insight, coping, personality.  Social factors contributing to presentation include financial stressors, communication challenges. Protective factors include willingness to engage in treatment, lack of prior suicide attempts/SIB.      The patient's reported symptoms of suicidal ideation in the context of recent psychosocial stressors are consistent with depressive disorder, most likely major depressive episode. Differential diagnosis includes unspecified depressive disorder, adjustment disorder, intrusive thoughts secondary to OCD.  He will likely benefit from medication management, stabilization and observation this admission.     Given that he currently has SI, patient warrants inpatient psychiatric hospitalization to maintain his safety.      Psychiatric Plan by Diagnosis      Today's changes:  - increase prozac to 40mg   -increase prazosyn 3mg     # Suicidal Ideation with plan   # SIB with plan   # MDD, recurrent, severe  - Aripiprazole 7.5 mg PO qHS  - Fluoxetine 40 mg PO qAM  - Prazosin 3 mg PO at bedtime    # Unspecified anxiety disorder  - Hydroxyzine 25 mg PO TID PRN (for anxiety)    # ASD  # ADHD  -No medications at this time     Pertinent Labs/Monitoring:   - QTc 427 ms     Additional Plans:  - Patient will be treated in therapeutic milieu with appropriate individual and group therapies as described        Psychiatric Hospital Course:      Pj Tucker was admitted to [facility] as a voluntary patient. PTA aripiprazole 5mg, fluoxetine 20mg, prazosin 2mg, and hydroxyzine 25mg were continued. For medication optimization, aripiprazole was increased to 7.5mg po to target mood stabilization effects. Fluoxetine was increased to 30mg for medication optimization. PTA lamotrigine was discontinued given little benefit in symptoms control.     The risks, benefits, alternatives, and side effects were discussed and understood by the patient.     Medical Assessment and Plan     Medical diagnoses to be addressed this admission:  N/A    Medical course: Patient was physically examined by the ED prior to being transferred to the unit and was found to be medically stable and appropriate for admission.     Consults: medicine     Checklist     Legal Status: Voluntary     Safety Assessment:   Behavioral Orders   Procedures    Code 1 - Restrict to Unit    Psychological Testing     Request for Dr. Gore    Routine Programming     As clinically indicated    Self Injury Precaution    Status 15     Every 15 minutes.    Suicide precautions: Suicide Risk: MODERATE; Clinical rationale to override score: modification to the care environment, lack of access to a plan for self-harm     Patients on Suicide Precautions should have a Combination Diet ordered that includes a Diet selection(s) AND a Behavioral Tray selection for Safe Tray - with utensils, or Safe Tray - NO utensils       Order Specific Question:   Suicide Risk     Answer:   MODERATE     Order Specific Question:   Clinical rationale to override score:     Answer:   modification to the care environment     Order Specific Question:   Clinical rationale to override score:     Answer:   lack of access to a plan for self-harm       Risk Assessment:  Risk for harm is elevated.  Risk factors: SI, impulsive, and psychosocial stressors  Protective factors: engaged in treatment     SIO:  none    Disposition: Pending stabilization, medication optimization, & development of a safe discharge plan.    Attestation:  This patient has been seen and evaluated by me, Linette Bello MD.  I have discussed this patient with the house staff team including the resident and medical student and I agree with the findings and plan in this note.    I have reviewed today's vital signs, medications, labs and imaging. Linette Bello MD , PhD.

## 2024-05-09 PROCEDURE — 99232 SBSQ HOSP IP/OBS MODERATE 35: CPT | Performed by: PSYCHIATRY & NEUROLOGY

## 2024-05-09 PROCEDURE — 124N000002 HC R&B MH UMMC

## 2024-05-09 PROCEDURE — 250N000013 HC RX MED GY IP 250 OP 250 PS 637: Performed by: PSYCHIATRY & NEUROLOGY

## 2024-05-09 PROCEDURE — 250N000013 HC RX MED GY IP 250 OP 250 PS 637: Performed by: STUDENT IN AN ORGANIZED HEALTH CARE EDUCATION/TRAINING PROGRAM

## 2024-05-09 RX ADMIN — FLUOXETINE HYDROCHLORIDE 40 MG: 20 CAPSULE ORAL at 08:55

## 2024-05-09 RX ADMIN — ARIPIPRAZOLE 10 MG: 10 TABLET ORAL at 20:48

## 2024-05-09 RX ADMIN — PRAZOSIN HYDROCHLORIDE 3 MG: 2 CAPSULE ORAL at 20:48

## 2024-05-09 ASSESSMENT — ACTIVITIES OF DAILY LIVING (ADL)
ADLS_ACUITY_SCORE: 43
DRESS: INDEPENDENT
HYGIENE/GROOMING: INDEPENDENT
ADLS_ACUITY_SCORE: 43
ADLS_ACUITY_SCORE: 43
LAUNDRY: WITH SUPERVISION
ADLS_ACUITY_SCORE: 43
ORAL_HYGIENE: INDEPENDENT
ADLS_ACUITY_SCORE: 43

## 2024-05-09 NOTE — PLAN OF CARE
Pt was visible in the milieu. Pt st in the lounge for awhile watching tv and also played cards with peers in the dining room. Presents with flat affect. Endorsed anxiety 5/10 and depression 7/10. Prn Hydroxyzine was administered for the anxiety as well as Tylenol for headache and both were helpful. Pt denied for suicidal thoughts at time of assessment stating SI thoughts comes and goes. Denied for AH/VH. Compliant with scheduled medications. Ate adequately for supper.     Goal Outcome Evaluation:  Problem: Suicide Risk  Goal: Absence of Self-Harm  Outcome: Progressing     Problem: Depression  Goal: Improved Mood  Outcome: Progressing     Problem: Anxiety  Goal: Anxiety Reduction or Resolution  Outcome: Progressing

## 2024-05-09 NOTE — PLAN OF CARE
Pt is intermittently visible in the milieu. Presenting with a flat affect, pt is pleasant and cooperative, medication compliant. His oral food intake is adequate. Pt was prompted and encouraged to join groups, but he declined. He said that he would rather do puzzles and sudokus by himself.  He slept mostly in the morning. In the afternoon, pt  comes out of his room and sits by the [facility] area, watching TV.  No interaction or engagement with peers is observed. He was noted to be on the phone.Pt endorsed anxiety and depression both at 6/10. Denies hallucinations and endorses passive SI but contracts for safety. No behavioral concerns this shift.     Problem: Adult Behavioral Health Plan of Care  Goal: Optimized Coping Skills in Response to Life Stressors  Outcome: Not Progressing  Intervention: Promote Effective Coping Strategies  Recent Flowsheet Documentation  Taken 5/9/2024 0927 by Amber Zheng RN  Supportive Measures:   active listening utilized   counseling provided     Problem: Adult Behavioral Health Plan of Care  Goal: Adheres to Safety Considerations for Self and Others  Outcome: Progressing  Intervention: Develop and Maintain Individualized Safety Plan  Recent Flowsheet Documentation  Taken 5/9/2024 1200 by Amber Zheng RN  Safety Measures:   environmental rounds completed   safety plan reviewed   safety rounds completed   self-directed behavior promoted   suicide assessment completed   suicide check-in completed  Goal: Absence of New-Onset Illness or Injury  Outcome: Progressing  Intervention: Identify and Manage Fall Risk  Recent Flowsheet Documentation  Taken 5/9/2024 1200 by Amber Zheng RN  Safety Measures:   environmental rounds completed   safety plan reviewed   safety rounds completed   self-directed behavior promoted   suicide assessment completed   suicide check-in completed     Problem: Suicide Risk  Goal: Absence of Self-Harm  Outcome:  Progressing  Intervention: Assess Risk to Self and Maintain Safety  Recent Flowsheet Documentation  Taken 5/9/2024 1200 by Amber Zheng RN  Self-Harm Prevention:   environmental self-harm risks assessed   environment modified for self-harm risk  Intervention: Promote Psychosocial Wellbeing  Recent Flowsheet Documentation  Taken 5/9/2024 1200 by Amber Zheng RN  Family/Support System Care:   involvement promoted   self-care encouraged  Taken 5/9/2024 0927 by Amber Zheng RN  Supportive Measures:   active listening utilized   counseling provided  Family/Support System Care:   involvement promoted   self-care encouraged  Intervention: Establish Safety Plan and Continuity of Care  Recent Flowsheet Documentation  Taken 5/9/2024 1200 by Amber Zheng RN  Safe Transition Promotion: protective factors promoted  Taken 5/9/2024 0927 by Amber Zheng RN  Safe Transition Promotion: protective factors promoted     Problem: Depression  Goal: Improved Mood  Outcome: Progressing  Intervention: Monitor and Manage Depressive Symptoms  Recent Flowsheet Documentation  Taken 5/9/2024 1200 by Amber Zheng RN  Family/Support System Care:   involvement promoted   self-care encouraged  Taken 5/9/2024 0927 by Amber Zheng RN  Supportive Measures:   active listening utilized   counseling provided  Family/Support System Care:   involvement promoted   self-care encouraged     Problem: Anxiety  Goal: Anxiety Reduction or Resolution  Outcome: Progressing  Intervention: Promote Anxiety Reduction  Recent Flowsheet Documentation  Taken 5/9/2024 1200 by Amber Zheng RN  Family/Support System Care:   involvement promoted   self-care encouraged  Taken 5/9/2024 0927 by Amber Zheng RN  Supportive Measures:   active listening utilized   counseling provided  Family/Support System Care:   involvement promoted   self-care encouraged      Problem: Suicidal Behavior  Goal: Suicidal Behavior is Absent or Managed  Outcome: Progressing  Intervention: Provide Immediate and Ongoing Protective Physical Environment  Recent Flowsheet Documentation  Taken 5/9/2024 1200 by Amber Zheng, RN  Safe Transition Promotion: protective factors promoted  Taken 5/9/2024 0927 by Amber Zheng, RN  Safe Transition Promotion: protective factors promoted     Problem: Sleep Disturbance  Goal: Adequate Sleep/Rest  Outcome: Progressing     Problem: Psychotic Signs/Symptoms  Goal: Improved Behavioral Control (Psychotic Signs/Symptoms)  Outcome: Progressing   Goal Outcome Evaluation:    Plan of Care Reviewed With: patient

## 2024-05-09 NOTE — PLAN OF CARE
BEH IP Unit Acuity Rating Score (UARS)  Patient is given one point for every criteria they meet.     CRITERIA SCORING   On a 72 hour hold, court hold, committed, stay of commitment, or revocation. 0    Patient LOS on BEH unit exceeds 20 days. 0  LOS: 16   Patient under guardianship, 55+, otherwise medically complex, or under age 11. 0   Suicide ideation without relief of precipitating factors. 1   Current plan for suicide. 0   Current plan for homicide. 0   Imminent risk or actual attempt to seriously harm another without relief of factors precipitating the attempt. 1   Severe dysfunction in daily living (ex: complete neglect for self care, extreme disruption in vegetative function, extreme deterioration in social interactions). 1   Recent (last 7 days) or current physical aggression in the ED or on unit. 0   Restraints or seclusion episode in past 72 hours. 0   Recent (last 7 days) or current verbal aggression, agitation, yelling, etc., while in the ED or unit. 0   Active psychosis. 0   Need for constant or near constant redirection (from leaving, from others, etc).  0   Intrusive or disruptive behaviors. 0   Patient requires 3 or more hours of individualized nursing care per 8-hour shift (i.e. for ADLs, meds, therapeutic interventions). 0   TOTAL 3

## 2024-05-09 NOTE — PLAN OF CARE
"  Problem: Sleep Disturbance  Goal: Adequate Sleep/Rest  Outcome: Progressing   Goal Outcome Evaluation:      Pt had a quiet night. He appears to have slept for 7 hours. No behavioral or safety concerns noted this shift.  Nursing to continue to monitor and offer support    Blood pressure 108/81, pulse 75, temperature 97.9  F (36.6  C), temperature source Temporal, resp. rate 16, height 1.727 m (5' 8\"), weight 112.2 kg (247 lb 6.4 oz), SpO2 97%.   "

## 2024-05-09 NOTE — PLAN OF CARE
Assessment/Intervention/Current Symtoms and Care Coordination:  Chart reviewed and patient met with team,   Discussed patient progress, symptomology, and response to treatment.  Discussed the discharge plan and any potential impediments to discharge.     Patient met with team. Patient reports feeling possibly s/w better.  Meds continue to be monitored/adjusted per MD's    Some intermittent group attendance. Engages with peers playing cards.  Awaiting work from patient's CM for update on MA application at the [facility]. Writer will continue to work closely with [facility] CM to coordinate care.     Discharge Plan or Goal:  PLacement- IRTS  Psychiatry  Therapy     Barriers to Discharge:  Ongoing depression/SI  Placement- patient is homeless  Lack of insurance 5/1/24     Referral Status:  None today     Legal Status:  Voluntary     Contacts:  Psychiatrist: Jazmin JASON  [facility] , [place] [address] [phone]     Primary Physician: [facility]sv[facility]     Therapist: Naomi Schoenberg @ [facility] - [facility], [place] [address] [phone]     [facility] : Elbert Heard: [phone]  [facility] ([facility])     Upcoming Meetings and Dates/Important Information and next steps:  Treatment update  Wed

## 2024-05-09 NOTE — PROGRESS NOTES
"  ----------------------------------------------------------------------------------------------------------  [facility]  Psychiatry Progress Note  Hospital Day #16     Interim History:     The patient's care was discussed with the treatment team and chart notes were reviewed.    Vitals: Temp: 97.9  F (36.6  C) Temp src: Temporal BP: 108/81 Pulse: 75     SpO2: 97 % O2 Device: None (Room air)     Hypertensive to 140s, otherwise VSS  Sleep: 7 hours (05/09/24 0600)  Scheduled medications: Took all scheduled medications as prescribed  Psychiatric PRN medications:   Last 24H PRN:     acetaminophen (TYLENOL) tablet 325 mg, 325 mg at 05/08/24 1750    hydrOXYzine HCl (ATARAX) tablet 25 mg, 25 mg at 05/08/24 1624    Staff Report:  Pt appears to have slept for 7 hours. No c/o pain/discomfort. Pt received PRN hydroxyzine for anxiety as well as Tylenol for headache, both were helpful. There are no new concerns noted at this time.     Subjective:     Patient Interview:  The patient was seen in his room, he was sitting and journaling. He reports his mood is \"not any better really\" and he states he feels the same as when he got here, with no improvement in depression and anxiety. He endorses \"feeling flat\".   He states that his sleep was fine with no nightmares. He reports no active thoughts about harming himself, mentions SI linger in the background.   He says the Prozac has been making him feel tired throughout the day, requested switch to bedtime. Tolerating Prazosin well.     ROS:  Negative unless stated above     Objective:     Vitals:  /81   Pulse 75   Temp 97.9  F (36.6  C) (Temporal)   Resp 16   Ht 1.727 m (5' 8\")   Wt 112.2 kg (247 lb 6.4 oz)   SpO2 97%   BMI 37.62 kg/m      Allergies:  No Known Allergies    Current Medications:  Scheduled:  Current Facility-Administered Medications   Medication Dose Route Frequency Provider Last Rate Last Admin    acetaminophen " (TYLENOL) tablet 325 mg  325 mg Oral Q4H PRN Tyrese Moe MD   325 mg at 05/08/24 1750    ARIPiprazole (ABILIFY) tablet 10 mg  10 mg Oral At Bedtime Johnny Jay MD   10 mg at 05/08/24 2030    FLUoxetine (PROzac) capsule 40 mg  40 mg Oral Linette Martinez MD        hydrOXYzine HCl (ATARAX) tablet 25 mg  25 mg Oral BID PRN Tyrese Moe MD   25 mg at 05/08/24 1624    OLANZapine (zyPREXA) tablet 5 mg  5 mg Oral TID PRN Tyrese Moe MD        Or    OLANZapine (zyPREXA) injection 5 mg  5 mg Intramuscular TID PRN Tyrese Moe MD        ondansetron (ZOFRAN) tablet 4 mg  4 mg Oral Q6H PRN Lydia Dhaliwal MD   4 mg at 04/29/24 1800    polyethylene glycol (MIRALAX) Packet 17 g  17 g Oral Daily PRN Tyrese Moe MD        polyethylene glycol-propylene glycol PF (SYSTANE ULTRA PF) opthalmic solution 1 drop  1 drop Both Eyes Q1H PRN Johnny Jay MD   1 drop at 04/28/24 1702    prazosin (MINIPRESS) capsule 3 mg  3 mg Oral At Bedtime Linette Bello MD   3 mg at 05/08/24 2030       PRN:  Current Facility-Administered Medications   Medication Dose Route Frequency Provider Last Rate Last Admin    acetaminophen (TYLENOL) tablet 325 mg  325 mg Oral Q4H PRN Tyrese Moe MD   325 mg at 05/08/24 1750    ARIPiprazole (ABILIFY) tablet 10 mg  10 mg Oral At Bedtime Johnny Jay MD   10 mg at 05/08/24 2030    FLUoxetine (PROzac) capsule 40 mg  40 mg Oral Linette Martinez MD        hydrOXYzine HCl (ATARAX) tablet 25 mg  25 mg Oral BID PRN Tyrese Moe MD   25 mg at 05/08/24 1624    OLANZapine (zyPREXA) tablet 5 mg  5 mg Oral TID PRN Tyrese Moe MD        Or    OLANZapine (zyPREXA) injection 5 mg  5 mg Intramuscular TID PRN Tyrese Moe MD        ondansetron (ZOFRAN) tablet 4 mg  4 mg Oral Q6H PRN Lydia Dhaliwal MD   4 mg at 04/29/24 1800    polyethylene glycol (MIRALAX) Packet 17 g  17 g Oral Daily PRN Tyrese Moe MD        polyethylene  "glycol-propylene glycol PF (SYSTANE ULTRA PF) opthalmic solution 1 drop  1 drop Both Eyes Q1H Johnny Fagan MD   1 drop at 04/28/24 1702    prazosin (MINIPRESS) capsule 3 mg  3 mg Oral At Bedtime Linette Bello MD   3 mg at 05/08/24 2030       Labs and Imaging:  New results:   No results found for this or any previous visit (from the past 24 hour(s)).    Data this admission:  - CBC unremarkable  - CMP unremarkable  - TSH normal  - UDS negative  - Hgb A1c normal, 5.3  - Lipids remarkable for low HDL cholesterol, high LDL cholesterol  - EKG normal sinus rhythm, QTc 427     Mental Status Exam:     Oriented to:  Grossly Oriented  General:  Awake and Alert  Appearance:  appears stated age and Grooming is adequate  Behavior/Attitude:  Calm, Cooperative, Guarded, and Open  Eye Contact: Appropriate  Psychomotor: Normal and No evidence of tics, dystonia, or tardive dyskinesia  no catatonia present  Speech:  appropriate volume/tone, paucity, and hesitant  Language: Fluent in English with appropriate syntax and vocabulary.  Mood:  \"not any better\"  Affect:  appropriate, congruent with mood, restricted, and anxious  Thought Process:  linear and coherent  Thought Content:     , suicidal ideation with plan (without intent), No HI, does not appear to be responding to internal stimuli, No VH, and No AH; No apparent delusions  Associations:  intact  Insight:  fair due to understanding of condition  Judgment:  fair due to voluntary presentation  Impulse control: partial  Attention Span:  grossly intact and adequate for conversation  Concentration:  grossly intact  Recent and Remote Memory:  not formally assessed  Fund of Knowledge: estimated below average  Muscle Strength and Tone:  not formally assessed  Gait and Station: Normal     Psychiatric Assessment     Pj Tucker is a 29 year old male previously diagnosed with MDD, ADHD, autism spectrum disorder who presented voluntarily by himself with suicidal thoughts in the " context of increased psychosocial stressors, medication non-adherence. No prior psychiatric hospitalizations. Significant symptoms on admission include suicidal ideation with plan, increased impulsivity. The MSE on admission was pertinent for dysphoric but reactive affect, linear and goal-directed in thought. Biological contributions to presentation include diagnoses of autism spectrum disorder, medication non-adherence. Psychological contributions to presentation include diagnoses of ADHD, MDD as well as insight, coping, personality.  Social factors contributing to presentation include financial stressors, communication challenges. Protective factors include willingness to engage in treatment, lack of prior suicide attempts/SIB.      The patient's reported symptoms of suicidal ideation in the context of recent psychosocial stressors are consistent with depressive disorder, most likely major depressive episode. Differential diagnosis includes unspecified depressive disorder, adjustment disorder, intrusive thoughts secondary to OCD.  He will likely benefit from medication management, stabilization and observation this admission.     Given that he currently has SI, patient warrants inpatient psychiatric hospitalization to maintain his safety.      Psychiatric Plan by Diagnosis      Today's changes:  - change Prazosin intake dosage from AM to PM.         # Suicidal Ideation with plan   # SIB with plan   # MDD, recurrent, severe  - Aripiprazole 7.5 mg PO qHS  - Fluoxetine 40 mg PO qAM  - Prazosin 3 mg PO at bedtime    # Unspecified anxiety disorder  - Hydroxyzine 25 mg PO TID PRN (for anxiety)    # ASD  # ADHD  -No medications at this time     Pertinent Labs/Monitoring:   - QTc 427 ms     Additional Plans:  - Patient will be treated in therapeutic milieu with appropriate individual and group therapies as described       Psychiatric Hospital Course:      Pj Tucker was admitted to [facility] as a voluntary patient.  PTA aripiprazole 5mg, fluoxetine 20mg, prazosin 2mg, and hydroxyzine 25mg were continued. For medication optimization, aripiprazole was increased to 7.5mg po to target mood stabilization effects. Fluoxetine was increased to 30mg for medication optimization. PTA lamotrigine was discontinued given little benefit in symptoms control.     The risks, benefits, alternatives, and side effects were discussed and understood by the patient.     Medical Assessment and Plan     Medical diagnoses to be addressed this admission:  N/A    Medical course: Patient was physically examined by the ED prior to being transferred to the unit and was found to be medically stable and appropriate for admission.     Consults: medicine     Checklist     Legal Status: Voluntary     Safety Assessment:   Behavioral Orders   Procedures    Code 1 - Restrict to Unit    Psychological Testing     Request for Dr. Gore    Routine Programming     As clinically indicated    Self Injury Precaution    Status 15     Every 15 minutes.    Suicide precautions: Suicide Risk: MODERATE; Clinical rationale to override score: modification to the care environment, lack of access to a plan for self-harm     Patients on Suicide Precautions should have a Combination Diet ordered that includes a Diet selection(s) AND a Behavioral Tray selection for Safe Tray - with utensils, or Safe Tray - NO utensils       Order Specific Question:   Suicide Risk     Answer:   MODERATE     Order Specific Question:   Clinical rationale to override score:     Answer:   modification to the care environment     Order Specific Question:   Clinical rationale to override score:     Answer:   lack of access to a plan for self-harm       Risk Assessment:  Risk for harm is elevated.  Risk factors: SI, impulsive, and psychosocial stressors  Protective factors: engaged in treatment     SIO: none    Disposition: Pending stabilization, medication optimization, & development of a safe discharge  plan.    Attestation:  Cait Crespo, MS3    I was present with the medical student who participated in the service and in the documentation of the note. I have verified the history and personally performed the exam and medical decision making. I agree with the assessment and plan of care as documented in the note. Linette Bello M.D., Ph.D.

## 2024-05-10 PROCEDURE — 124N000002 HC R&B MH UMMC

## 2024-05-10 PROCEDURE — 250N000013 HC RX MED GY IP 250 OP 250 PS 637: Performed by: STUDENT IN AN ORGANIZED HEALTH CARE EDUCATION/TRAINING PROGRAM

## 2024-05-10 PROCEDURE — 250N000013 HC RX MED GY IP 250 OP 250 PS 637: Performed by: PSYCHIATRY & NEUROLOGY

## 2024-05-10 PROCEDURE — 99232 SBSQ HOSP IP/OBS MODERATE 35: CPT | Mod: GC | Performed by: STUDENT IN AN ORGANIZED HEALTH CARE EDUCATION/TRAINING PROGRAM

## 2024-05-10 RX ADMIN — FLUOXETINE 40 MG: 20 CAPSULE ORAL at 21:00

## 2024-05-10 RX ADMIN — PRAZOSIN HYDROCHLORIDE 3 MG: 2 CAPSULE ORAL at 21:00

## 2024-05-10 RX ADMIN — ARIPIPRAZOLE 10 MG: 10 TABLET ORAL at 21:00

## 2024-05-10 ASSESSMENT — ACTIVITIES OF DAILY LIVING (ADL)
ADLS_ACUITY_SCORE: 43
ORAL_HYGIENE: INDEPENDENT
ADLS_ACUITY_SCORE: 43
HYGIENE/GROOMING: INDEPENDENT
ADLS_ACUITY_SCORE: 43
DRESS: INDEPENDENT
ADLS_ACUITY_SCORE: 43
ORAL_HYGIENE: INDEPENDENT
ADLS_ACUITY_SCORE: 43
LAUNDRY: WITH SUPERVISION
ADLS_ACUITY_SCORE: 43
HYGIENE/GROOMING: INDEPENDENT
DRESS: INDEPENDENT
ADLS_ACUITY_SCORE: 43

## 2024-05-10 NOTE — PLAN OF CARE
BEH IP Unit Acuity Rating Score (UARS)  Patient is given one point for every criteria they meet.     CRITERIA SCORING   On a 72 hour hold, court hold, committed, stay of commitment, or revocation. 0    Patient LOS on BEH unit exceeds 20 days. 0  LOS: 17   Patient under guardianship, 55+, otherwise medically complex, or under age 11. 0   Suicide ideation without relief of precipitating factors. 1   Current plan for suicide. 0   Current plan for homicide. 0   Imminent risk or actual attempt to seriously harm another without relief of factors precipitating the attempt. 1   Severe dysfunction in daily living (ex: complete neglect for self care, extreme disruption in vegetative function, extreme deterioration in social interactions). 1   Recent (last 7 days) or current physical aggression in the ED or on unit. 0   Restraints or seclusion episode in past 72 hours. 0   Recent (last 7 days) or current verbal aggression, agitation, yelling, etc., while in the ED or unit. 0   Active psychosis. 0   Need for constant or near constant redirection (from leaving, from others, etc).  0   Intrusive or disruptive behaviors. 0   Patient requires 3 or more hours of individualized nursing care per 8-hour shift (i.e. for ADLs, meds, therapeutic interventions). 0   TOTAL 3

## 2024-05-10 NOTE — PROGRESS NOTES
"  ----------------------------------------------------------------------------------------------------------  [facility]  Psychiatry Progress Note  Hospital Day #17     Interim History:     The patient's care was discussed with the treatment team and chart notes were reviewed.    Vitals: Temp: 97.4  F (36.3  C) Temp src: Temporal BP: 120/84 Pulse: 77     SpO2: 98 % O2 Device: None (Room air)     Hypertensive to 140s, otherwise VSS  Sleep: 7 hours (05/10/24 0620)  Scheduled medications: Took all scheduled medications as prescribed  Psychiatric PRN medications: None    Staff Report:  No acute concerns overnight, no behavioral concerns noted. Withdrawn mostly, played chess with peer. Reports meds make him sleepy. Declines groups. See staff notes for additional details.      Subjective:     Patient Interview:  Patient reports feeling \"just fine\". He mentions that he has been feeling better than yesterday. Pj also reports that his depression is better today, however, he doesn't report feeling any happier than before. He feels happy that his MA was approved. When asked, he also mentions playing chess with other patient on the unit. He was observed by the team to be smiling during to the interview. He states \"can't think of anything\" he wants to change to make him happier. He has been tolerating his medications well and doesn't wish to adjust any.     ROS:  Patient denies acute concerns      Objective:     Vitals:  /84   Pulse 77   Temp 97.4  F (36.3  C) (Temporal)   Resp 16   Ht 1.727 m (5' 8\")   Wt 112.2 kg (247 lb 6.4 oz)   SpO2 98%   BMI 37.62 kg/m      Allergies:  No Known Allergies    Current Medications:  Scheduled:  Current Facility-Administered Medications   Medication Dose Route Frequency Provider Last Rate Last Admin    acetaminophen (TYLENOL) tablet 325 mg  325 mg Oral Q4H PRN Safi-Aghdam, Tyrese, MD   325 mg at 05/08/24 1750    ARIPiprazole " (ABILIFY) tablet 10 mg  10 mg Oral At Bedtime Johnny Jay MD   10 mg at 05/09/24 2048    FLUoxetine (PROzac) capsule 40 mg  40 mg Oral At Bedtime Linetet Bello MD        hydrOXYzine HCl (ATARAX) tablet 25 mg  25 mg Oral BID PRN Tyrese Moe MD   25 mg at 05/08/24 1624    OLANZapine (zyPREXA) tablet 5 mg  5 mg Oral TID PRN Tyrese Moe MD        Or    OLANZapine (zyPREXA) injection 5 mg  5 mg Intramuscular TID PRN Tyrese Moe MD        ondansetron (ZOFRAN) tablet 4 mg  4 mg Oral Q6H PRN Lydia Dhaliwal MD   4 mg at 04/29/24 1800    polyethylene glycol (MIRALAX) Packet 17 g  17 g Oral Daily PRN Tyrese Moe MD        polyethylene glycol-propylene glycol PF (SYSTANE ULTRA PF) opthalmic solution 1 drop  1 drop Both Eyes Q1H PRN Johnny Jay MD   1 drop at 04/28/24 1702    prazosin (MINIPRESS) capsule 3 mg  3 mg Oral At Bedtime Linette Bello MD   3 mg at 05/09/24 2048       PRN:  Current Facility-Administered Medications   Medication Dose Route Frequency Provider Last Rate Last Admin    acetaminophen (TYLENOL) tablet 325 mg  325 mg Oral Q4H PRN Tyrese Moe MD   325 mg at 05/08/24 1750    ARIPiprazole (ABILIFY) tablet 10 mg  10 mg Oral At Bedtime Johnny Jay MD   10 mg at 05/09/24 2048    FLUoxetine (PROzac) capsule 40 mg  40 mg Oral At Bedtime Linette Bello MD        hydrOXYzine HCl (ATARAX) tablet 25 mg  25 mg Oral BID PRN Tyrese Moe MD   25 mg at 05/08/24 1624    OLANZapine (zyPREXA) tablet 5 mg  5 mg Oral TID PRN Tyrese Moe MD        Or    OLANZapine (zyPREXA) injection 5 mg  5 mg Intramuscular TID PRN Tyrese Moe MD        ondansetron (ZOFRAN) tablet 4 mg  4 mg Oral Q6H PRN Lydia Dhaliwal MD   4 mg at 04/29/24 1800    polyethylene glycol (MIRALAX) Packet 17 g  17 g Oral Daily PRN Tyrese Moe MD        polyethylene glycol-propylene glycol PF (SYSTANE ULTRA PF) opthalmic solution 1 drop  1 drop Both Eyes Q1H PRN Misty  "MD Johnny   1 drop at 04/28/24 1702    prazosin (MINIPRESS) capsule 3 mg  3 mg Oral At Bedtime Linette Bello MD   3 mg at 05/09/24 2048       Labs and Imaging:  New results:   No results found for this or any previous visit (from the past 24 hour(s)).    Data this admission:  - CBC unremarkable  - CMP unremarkable  - TSH normal  - UDS negative  - Hgb A1c normal, 5.3  - Lipids remarkable for low HDL cholesterol, high LDL cholesterol  - EKG normal sinus rhythm, QTc 427     Mental Status Exam:     Oriented to:  Grossly Oriented  General:  Awake and Alert  Appearance:  appears stated age and Grooming is adequate  Behavior/Attitude:  Calm, Cooperative, Guarded, and Open  Eye Contact: Appropriate  Psychomotor: Normal and No evidence of tics, dystonia, or tardive dyskinesia  no catatonia present  Speech:  appropriate volume/tone, paucity, and hesitant  Language: Fluent in English with appropriate syntax and vocabulary.  Mood:  \"not better\"  Affect:  appropriate, was observed to be smiling during the interview and brighter affect then what is self reports  Thought Process:  linear and coherent  Thought Content:   suicidal ideation with plan (without intent), No HI, No VH, and No AH; No apparent delusions  Associations:  intact  Insight:  fair due to willingness to engage in treatment  Judgment:  fair due to voluntary presentation  Impulse control: partial  Attention Span:  grossly intact and adequate for conversation  Concentration:  grossly intact  Recent and Remote Memory:  not formally assessed  Fund of Knowledge: estimated below average  Muscle Strength and Tone:  not formally assessed  Gait and Station: Normal     Psychiatric Assessment     Pj Tucker is a 29 year old male previously diagnosed with MDD, ADHD, autism spectrum disorder who presented voluntarily by himself with suicidal thoughts in the context of increased psychosocial stressors, medication non-adherence. No prior psychiatric hospitalizations. " Significant symptoms on admission include suicidal ideation with plan, increased impulsivity. The MSE on admission was pertinent for dysphoric but reactive affect, linear and goal-directed in thought. Biological contributions to presentation include diagnoses of autism spectrum disorder, medication non-adherence. Psychological contributions to presentation include diagnoses of ADHD, MDD as well as insight, coping, personality.  Social factors contributing to presentation include financial stressors, communication challenges. Protective factors include willingness to engage in treatment, lack of prior suicide attempts/SIB.      The patient's reported symptoms of suicidal ideation in the context of recent psychosocial stressors are consistent with depressive disorder, most likely major depressive episode. Differential diagnosis includes unspecified depressive disorder, adjustment disorder, intrusive thoughts secondary to OCD.  He will likely benefit from medication management, stabilization and observation this admission.     Given that he currently has SI, patient warrants inpatient psychiatric hospitalization to maintain his safety.      Psychiatric Plan by Diagnosis      Today's changes:  - none       # Suicidal Ideation with plan   # SIB with plan   # MDD, recurrent, severe  - Aripiprazole 7.5 mg PO qHS  - Fluoxetine 40 mg PO qAM  - Prazosin 3 mg PO at bedtime    # Unspecified anxiety disorder  - Hydroxyzine 25 mg PO TID PRN (for anxiety)    # ASD  # ADHD  -No medications at this time     Pertinent Labs/Monitoring:   - QTc 427 ms     Additional Plans:  - Patient will be treated in therapeutic milieu with appropriate individual and group therapies as described       Psychiatric Hospital Course:      Pj Tucker was admitted to [facility] as a voluntary patient. PTA aripiprazole 5mg, fluoxetine 20mg, prazosin 2mg, and hydroxyzine 25mg were continued. For medication optimization, aripiprazole was increased to  10mg po to target mood stabilization effects. Fluoxetine was increased to 40mg for medication optimization along with Prazosin to 3mg. PTA lamotrigine was discontinued given little benefit in symptoms control.     The risks, benefits, alternatives, and side effects were discussed and understood by the patient.     Medical Assessment and Plan     Medical diagnoses to be addressed this admission:  N/A    Medical course: Patient was physically examined by the ED prior to being transferred to the unit and was found to be medically stable and appropriate for admission.     Consults: medicine     Checklist     Legal Status: Voluntary     Safety Assessment:   Behavioral Orders   Procedures    Code 1 - Restrict to Unit    Psychological Testing     Request for Dr. Gore    Routine Programming     As clinically indicated    Self Injury Precaution    Status 15     Every 15 minutes.    Suicide precautions: Suicide Risk: MODERATE; Clinical rationale to override score: modification to the care environment, lack of access to a plan for self-harm     Patients on Suicide Precautions should have a Combination Diet ordered that includes a Diet selection(s) AND a Behavioral Tray selection for Safe Tray - with utensils, or Safe Tray - NO utensils       Order Specific Question:   Suicide Risk     Answer:   MODERATE     Order Specific Question:   Clinical rationale to override score:     Answer:   modification to the care environment     Order Specific Question:   Clinical rationale to override score:     Answer:   lack of access to a plan for self-harm       Risk Assessment:  Risk for harm is elevated.  Risk factors: SI, impulsive, and psychosocial stressors  Protective factors: engaged in treatment     SIO: none    Disposition: Pending stabilization, medication optimization, & development of a safe discharge plan.    Attestation:  Cait Crespo, MS3    I was present with the medical student who participated in the service and  documentation of the note. I have verified the history and personally performed the physical/mental status exam and medical decision making. I agree with the assessment and plan documented in the note. Patient seen and discussed with attending physician, Dr. Johnny Jay, who is in agreement with my assessment and plan.    Sarah Ogden D.O.  Psychiatry Resident  [facility]

## 2024-05-10 NOTE — PLAN OF CARE
Assessment/Intervention/Current Symtoms and Care Coordination:  Chart reviewed and patient met with team,   Discussed patient progress, symptomology, and response to treatment.  Discussed the discharge plan and any potential impediments to discharge.     Patient met with team. Patient is appearing brighter and does acknowledge that he is feeling slightly better.  Patient observed smiling intermittently.  Meds continue to be monitored/adjusted per MD's  Patient has remained compliant.  Minimal group attendance however he does engage with peers playing chess/games.    Writer notified that patient's SMRT application has been approved.  Now waiting for MA to be activated. Writer received a call from Gricelda GUAJARDO - scheduled intake interview for 5/13/24 at 1:00pm.  Writer will continue to work closely with [facility] to coordinate care.     Discharge Plan or Goal:  PLacement- IRTS  Psychiatry  Therapy     Barriers to Discharge:  Ongoing depression/SI  Placement- patient is homeless  Lack of insurance 5/1/24     Referral Status:  Gricelda GUAJARDO - interview 5/13/24     Legal Status:  Voluntary     Contacts:  Psychiatrist: Jazmin JASON  [facility] , [place] [address] [phone]     Primary Physician: [facility]     Therapist: Naomi Schoenberg @ [facility] - [facility] , [place] [address] [phone]     [facility] : Elbert Heard: [phone]  [facility] ([facility])     Upcoming Meetings and Dates/Important Information and next steps:  Treatment update  Wed

## 2024-05-10 NOTE — PLAN OF CARE
Individual Therapy Note    Session duration in minutes: 10       Mental Status Exam:   Attitude: cooperative and guarded  Eye Contact: good  Mood: better  Affect: restricted range  Speech: clear, coherent  Psychomotor Behavior: no evidence of tardive dyskinesia, dystonia, or tics  Thought Process:  logical and linear  Associations: no loose associations  Thought Content: no evidence of suicidal ideation or homicidal ideation  Insight: partial  Judgement: fair  Attention Span and Concentration: fair    Pt progress: Pt stated that he is feeling better, more hopeful now that he has MA and an interview for IRTS on Monday. Pt is also hopeful that medications will slow his racing thoughts. Writer commended Pt on his progress and positive outlook, engaged in discussion around coping skills. Pt reports he pushes himself to go to some groups to get him outside of his comfort zone. Shared he does not have any friends but spends time with his dad and enjoys playing disc golf. Reports that issues with roommates stemmed from Pt hanging out with other bisexual people who Pt's roommates considered dangerous. Pt shared there are other issues with roommates but did not want to discuss them further. Pt was minimally engaged, appeared brighter than previous sessions.        No Charge (0-15 min)

## 2024-05-10 NOTE — PLAN OF CARE
Pt was withdrawn and isolative to his room intermittently. Presents with flat affect. Pt was working on VANDOLAY in his room and paid Chess with a peer in the lounge. Endorsed anxiety 5/10 and depression 5/10. Reported passive suicidal thoughts. Contracted for safety. Pt reported that he felt like the medications were making him sleepy. Ate supper adequately. Pt had a visitor this evening and visiting went well.     Goal Outcome Evaluation:  Problem: Adult Behavioral Health Plan of Care  Goal: Adheres to Safety Considerations for Self and Others  Intervention: Develop and Maintain Individualized Safety Plan  Recent Flowsheet Documentation  Taken 5/9/2024 1737 by Dani Castellanos, RN  Safety Measures:   safety rounds completed   environmental rounds completed  Goal: Absence of New-Onset Illness or Injury  Intervention: Identify and Manage Fall Risk  Recent Flowsheet Documentation  Taken 5/9/2024 1737 by Dani Castellanos, RN  Safety Measures:   safety rounds completed   environmental rounds completed     Problem: Depression  Goal: Improved Mood  Outcome: Progressing     Problem: Anxiety  Goal: Anxiety Reduction or Resolution  Outcome: Progressing

## 2024-05-10 NOTE — PLAN OF CARE
Problem: Sleep Disturbance  Goal: Adequate Sleep/Rest  Outcome: Progressing   Goal Outcome Evaluation:  Patient had an uneventful night. Appears to have slept for 7 hours. No complaints of pain or requests for prn's. Safety checks in place.

## 2024-05-10 NOTE — PLAN OF CARE
"RN Shift Assessment for Pj  [id number]     Pt verbalized some hopefulness regarding situation and that his  for [facility] and here are advocating for him. States diminished depression and anxiety and that his SI/SIB thoughts are steadily decreasing.     Activity: WDL, up in lounge watching TV and playing chess with peers, eating meals in milieu. Pt declining group participation stating he doesn't feel like it.   Verbalized Emotional State: anxiety, depression, hopefulness  Anxiety: 4/10 Anxiety symptoms: Unease and Obsessions and Compulsions  Depression: 4/10 Depression symptoms: None  SI: yes and passive   SIB: yes and passive Self Injury WDL  Aggression WDL  Safety Concerns Expressed: No Contracted for Safety: yes    PRN Medications Administered this shift: No  Scheduled Medications given: NA   Medication Side effects/ Concerns: Yes - please explain: pt noting some sleepiness that he thinks may be a side of meds.      Perceptual State: consistent with reality  Hallucinations: denies hallucinations  Delusions: no delusions  Judgment and Insight: judgment appropriate to situation  Thought Content: obsession, preoccupation, poverty of content, suicidal thoughts  Behavior: WDL  Emotion/Mood: facial expression relaxed, appropriate to situation  Affect: reflective of mood  Speech: paucity  Nutrition: 3-->adequate  Sleep/Rest/Relaxation: No concerns noted or observed  General Appearance WDL    Patient Currently in Pain: denies   /84   Pulse 77   Temp 97.1  F (36.2  C) (Temporal)   Resp 16   Ht 1.727 m (5' 8\")   Wt 112.2 kg (247 lb 6.4 oz)   SpO2 97%   BMI 37.62 kg/m      Problem: Anxiety  Goal: Anxiety Reduction or Resolution  Outcome: Progressing     Problem: Depression  Goal: Improved Mood  Outcome: Progressing   Goal Outcome Evaluation:    Plan of Care Reviewed With: patient      "

## 2024-05-10 NOTE — PLAN OF CARE
"[facility]  came to see pt this am.  Upon interaction with pt he is affect bright, pleasant and cooperative.  Pt sp far this shift have not observed pt go to any groups.  Pt little interaction with other patients. Pt ate meals  and sitting in lounge watching TV. Pt states waiting for housing.      Goal Outcome Evaluation:                        Problem: Adult Behavioral Health Plan of Care  Goal: Plan of Care Review  Outcome: Not Progressing  Goal: Patient-Specific Goal (Individualization)  Description: You can add care plan individualizations to a care plan. Examples of Individualization might be:  \"Parent requests to be called daily at 9am for status\", \"I have a hard time hearing out of my right ear\", or \"Do not touch me to wake me up as it startles  me\".  Outcome: Not Progressing  Goal: Adheres to Safety Considerations for Self and Others  Outcome: Not Progressing  Intervention: Develop and Maintain Individualized Safety Plan  Recent Flowsheet Documentation  Taken 5/10/2024 1136 by Janki Bone RN  Safety Measures:   safety rounds completed   environmental rounds completed  Goal: Absence of New-Onset Illness or Injury  Outcome: Not Progressing  Intervention: Identify and Manage Fall Risk  Recent Flowsheet Documentation  Taken 5/10/2024 1136 by Janki Bone RN  Safety Measures:   safety rounds completed   environmental rounds completed  Goal: Optimized Coping Skills in Response to Life Stressors  Outcome: Not Progressing  Goal: Develops/Participates in Therapeutic Rector to Support Successful Transition  Outcome: Not Progressing     Problem: Suicide Risk  Goal: Absence of Self-Harm  Outcome: Not Progressing     "

## 2024-05-11 PROCEDURE — 250N000013 HC RX MED GY IP 250 OP 250 PS 637: Performed by: PSYCHIATRY & NEUROLOGY

## 2024-05-11 PROCEDURE — 250N000013 HC RX MED GY IP 250 OP 250 PS 637: Performed by: STUDENT IN AN ORGANIZED HEALTH CARE EDUCATION/TRAINING PROGRAM

## 2024-05-11 PROCEDURE — 124N000002 HC R&B MH UMMC

## 2024-05-11 RX ADMIN — PRAZOSIN HYDROCHLORIDE 3 MG: 2 CAPSULE ORAL at 21:06

## 2024-05-11 RX ADMIN — FLUOXETINE 40 MG: 20 CAPSULE ORAL at 21:06

## 2024-05-11 RX ADMIN — ARIPIPRAZOLE 10 MG: 10 TABLET ORAL at 21:06

## 2024-05-11 ASSESSMENT — ACTIVITIES OF DAILY LIVING (ADL)
ADLS_ACUITY_SCORE: 43

## 2024-05-11 NOTE — PLAN OF CARE
Problem: Pain Acute  Goal: Optimal Pain Control and Function  Outcome: Progressing  Intervention: Prevent or Manage Pain  Recent Flowsheet Documentation  Taken 5/11/2024 0920 by Nicole Vizcaino RN  Sleep/Rest Enhancement:   awakenings minimized   noise level reduced   regular sleep/rest pattern promoted   reading promoted   relaxation techniques promoted   natural light exposure provided   consistent schedule promoted  Intervention: Optimize Psychosocial Wellbeing  Recent Flowsheet Documentation  Taken 5/11/2024 0920 by Nicole Vizcaino RN  Supportive Measures:   active listening utilized   journaling promoted   positive reinforcement provided   verbalization of feelings encouraged   self-responsibility promoted   self-reflection promoted   self-care encouraged   relaxation techniques promoted   problem-solving facilitated     Problem: Comorbidity Management  Goal: Maintenance of Asthma Control  Outcome: Progressing  Goal: Maintenance of Behavioral Health Symptom Control  Outcome: Progressing  Goal: Maintenance of COPD Symptom Control  Outcome: Progressing  Intervention: Maintain COPD Symptom Control  Recent Flowsheet Documentation  Taken 5/11/2024 0920 by Nicole Vizcaino RN  Supportive Measures:   active listening utilized   journaling promoted   positive reinforcement provided   verbalization of feelings encouraged   self-responsibility promoted   self-reflection promoted   self-care encouraged   relaxation techniques promoted   problem-solving facilitated  Goal: Blood Glucose Levels Within Targeted Range  Outcome: Progressing  Goal: Maintenance of Heart Failure Symptom Control  Outcome: Progressing  Goal: Blood Pressure in Desired Range  Outcome: Progressing  Goal: Maintenance of Osteoarthritis Symptom Control  Outcome: Progressing  Intervention: Maintain Osteoarthritis Symptom Control  Recent Flowsheet Documentation  Taken 5/11/2024 0900 by Nicole Vizcaino RN  Activity Management: up ad ml  Goal: Bariatric Home  Regimen Maintained  Outcome: Progressing  Goal: Maintenance of Seizure Control  Outcome: Progressing     Problem: Fall Injury Risk  Goal: Absence of Fall and Fall-Related Injury  Outcome: Progressing     Problem: Anxiety  Goal: Anxiety Reduction or Resolution  Outcome: Progressing  Intervention: Promote Anxiety Reduction  Recent Flowsheet Documentation  Taken 5/11/2024 0920 by Nicole Vizcaino RN  Supportive Measures:   active listening utilized   journaling promoted   positive reinforcement provided   verbalization of feelings encouraged   self-responsibility promoted   self-reflection promoted   self-care encouraged   relaxation techniques promoted   problem-solving facilitated  Family/Support System Care:   involvement promoted   presence promoted   self-care encouraged   support provided     Problem: Psychotic Signs/Symptoms  Goal: Improved Behavioral Control (Psychotic Signs/Symptoms)  Outcome: Progressing  Flowsheets (Taken 5/11/2024 1430)  Individualized Action Step (Improved Behavioral Control): patient verbalizes needs  Goal: Optimal Cognitive Function (Psychotic Signs/Symptoms)  Outcome: Progressing  Goal: Increased Participation and Engagement (Psychotic Signs/Symptoms)  Outcome: Progressing  Intervention: Facilitate Participation and Engagement  Recent Flowsheet Documentation  Taken 5/11/2024 0920 by Nicole Vizcaino RN  Supportive Measures:   active listening utilized   journaling promoted   positive reinforcement provided   verbalization of feelings encouraged   self-responsibility promoted   self-reflection promoted   self-care encouraged   relaxation techniques promoted   problem-solving facilitated  Goal: Improved Mood Symptoms (Psychotic Signs/Symptoms)  Outcome: Progressing  Intervention: Optimize Emotion and Mood  Recent Flowsheet Documentation  Taken 5/11/2024 0920 by Nicole Vizcaino RN  Supportive Measures:   active listening utilized   journaling promoted   positive reinforcement provided    verbalization of feelings encouraged   self-responsibility promoted   self-reflection promoted   self-care encouraged   relaxation techniques promoted   problem-solving facilitated  Goal: Improved Psychomotor Symptoms (Psychotic Signs/Symptoms)  Outcome: Progressing  Goal: Decreased Sensory Symptoms (Psychotic Signs/Symptoms)  Outcome: Progressing  Goal: Improved Sleep (Psychotic Signs/Symptoms)  Outcome: Progressing  Goal: Enhanced Social, Occupational or Functional Skills (Psychotic Signs/Symptoms)  Outcome: Progressing     Problem: Sleep Disturbance  Goal: Adequate Sleep/Rest  Outcome: Progressing  Intervention: Promote Sleep/Rest  Recent Flowsheet Documentation  Taken 5/11/2024 0920 by Nicole Vizcaino RN  Sleep/Rest Enhancement:   awakenings minimized   noise level reduced   regular sleep/rest pattern promoted   reading promoted   relaxation techniques promoted   natural light exposure provided   consistent schedule promoted   Goal Outcome Evaluation:  Patient has been intermittently visible in the milieu.No escalation of behaviors.No safety concerns.No PRN given this shift and no scheduled medication this shift.Appetite great & hydration adequate.Hygiene is appropriate.Patient had a shower today and did his laundry.  Patient denies psych symptoms SI/SIB/AH/VH.Patient contracted for safety.  Temp: 98.1  F (36.7  C) Temp src: Temporal BP: (!) 145/82 Pulse: 80     SpO2: 98 % O2 Device: None (Room air)

## 2024-05-11 NOTE — PLAN OF CARE
Problem: Anxiety  Goal: Anxiety Reduction or Resolution  Outcome: Progressing  Intervention: Promote Anxiety Reduction  Recent Flowsheet Documentation  Taken 5/11/2024 1842 by Mauricio George RN  Supportive Measures:   active listening utilized   self-care encouraged   positive reinforcement provided   self-responsibility promoted  Family/Support System Care:   presence promoted   self-care encouraged   support provided   involvement promoted  Taken 5/11/2024 1800 by Mauricio George RN  Supportive Measures:   positive reinforcement provided   problem-solving facilitated   relaxation techniques promoted  Family/Support System Care:   involvement promoted   presence promoted   self-care encouraged     Problem: Psychotic Signs/Symptoms  Goal: Improved Behavioral Control (Psychotic Signs/Symptoms)  Outcome: Progressing   Goal Outcome Evaluation:    Plan of Care Reviewed With: patient      Patient behaved well this shift. He engages and socializes with peers. He communicates needs respectfully and politely and attends unit group sessions. He contracts for safety and willingly takes medications. He eats 100% of his meals and is alert and oriented x4. His vitals are normal and psychosis is absent. He denies mental health symptoms and presents a bright and pleasant affect. His mood is calm and cheerful and he is overall cooperative  and relaxed on approach.

## 2024-05-11 NOTE — PLAN OF CARE
Problem: Anxiety  Goal: Anxiety Reduction or Resolution  Outcome: Progressing  Intervention: Promote Anxiety Reduction  Recent Flowsheet Documentation  Taken 5/10/2024 2000 by Mauricio George RN  Supportive Measures:   positive reinforcement provided   problem-solving facilitated   relaxation techniques promoted  Family/Support System Care:   involvement promoted   presence promoted   self-care encouraged     Problem: Psychotic Signs/Symptoms  Goal: Improved Behavioral Control (Psychotic Signs/Symptoms)  Outcome: Progressing   Goal Outcome Evaluation:    Plan of Care Reviewed With: patient      Patient was present in the lounge area through the entire shift watching tv/basketball playoffs. Affect appeared flat and guarded, but pleasant on approach. He denied all psych symptoms and had no PRNs. BP was slightly elevated and patient was compliant with medications. No psychosis present. Patient participated in all unit activities, with no abnormal behavior observed. Staff will continue monitor and encourage.

## 2024-05-12 PROCEDURE — 250N000013 HC RX MED GY IP 250 OP 250 PS 637: Performed by: PSYCHIATRY & NEUROLOGY

## 2024-05-12 PROCEDURE — 124N000002 HC R&B MH UMMC

## 2024-05-12 PROCEDURE — 250N000013 HC RX MED GY IP 250 OP 250 PS 637: Performed by: STUDENT IN AN ORGANIZED HEALTH CARE EDUCATION/TRAINING PROGRAM

## 2024-05-12 RX ADMIN — ARIPIPRAZOLE 10 MG: 10 TABLET ORAL at 21:29

## 2024-05-12 RX ADMIN — FLUOXETINE 40 MG: 20 CAPSULE ORAL at 21:29

## 2024-05-12 RX ADMIN — PRAZOSIN HYDROCHLORIDE 3 MG: 2 CAPSULE ORAL at 21:29

## 2024-05-12 ASSESSMENT — ACTIVITIES OF DAILY LIVING (ADL)
ORAL_HYGIENE: INDEPENDENT
LAUNDRY: WITH SUPERVISION
ADLS_ACUITY_SCORE: 43
DRESS: INDEPENDENT
ADLS_ACUITY_SCORE: 43
ADLS_ACUITY_SCORE: 43
HYGIENE/GROOMING: INDEPENDENT
ADLS_ACUITY_SCORE: 43

## 2024-05-12 NOTE — PLAN OF CARE
Problem: Pain Acute  Goal: Optimal Pain Control and Function  Outcome: Progressing     Problem: Comorbidity Management  Goal: Maintenance of Asthma Control  Outcome: Progressing  Goal: Maintenance of Behavioral Health Symptom Control  Outcome: Progressing  Goal: Maintenance of COPD Symptom Control  Outcome: Progressing  Goal: Blood Glucose Levels Within Targeted Range  Outcome: Progressing  Goal: Maintenance of Heart Failure Symptom Control  Outcome: Progressing  Goal: Blood Pressure in Desired Range  Outcome: Progressing  Goal: Maintenance of Osteoarthritis Symptom Control  Outcome: Progressing  Intervention: Maintain Osteoarthritis Symptom Control  Recent Flowsheet Documentation  Taken 5/12/2024 0800 by Nicole Vizcaino RN  Activity Management: up ad ml  Goal: Bariatric Home Regimen Maintained  Outcome: Progressing  Goal: Maintenance of Seizure Control  Outcome: Progressing     Problem: Fall Injury Risk  Goal: Absence of Fall and Fall-Related Injury  Outcome: Progressing     Problem: Anxiety  Goal: Anxiety Reduction or Resolution  Outcome: Progressing     Problem: Psychotic Signs/Symptoms  Goal: Improved Behavioral Control (Psychotic Signs/Symptoms)  Outcome: Progressing  Goal: Optimal Cognitive Function (Psychotic Signs/Symptoms)  Outcome: Progressing  Goal: Increased Participation and Engagement (Psychotic Signs/Symptoms)  Outcome: Progressing  Goal: Improved Mood Symptoms (Psychotic Signs/Symptoms)  Outcome: Progressing  Goal: Improved Psychomotor Symptoms (Psychotic Signs/Symptoms)  Outcome: Progressing  Goal: Decreased Sensory Symptoms (Psychotic Signs/Symptoms)  Outcome: Progressing  Goal: Improved Sleep (Psychotic Signs/Symptoms)  Outcome: Progressing  Goal: Enhanced Social, Occupational or Functional Skills (Psychotic Signs/Symptoms)  Outcome: Progressing     Problem: Sleep Disturbance  Goal: Adequate Sleep/Rest  Outcome: Progressing   Goal Outcome Evaluation:  Pj has been visible in the  milieu.Patient has no medication scheduled this shift.No PRN given/requested.Mood has been calm.Full range affect.No sign of psychosis.Patient sociable with select peers.Patient watched movies and TV in the lounge with peers.Hygiene appropriate,took a shower.Adequate Nutrition as well as hydration.Patient was visited by a friend today.Denies pain/discomfort.Denies psych symptoms SI/SIB/AH/VH.No escalation of behaviors.No safety concerns.Contracted for safety.  Temp: 97.3  F (36.3  C) Temp src: Oral BP: 136/89 Pulse: 99     SpO2: 97 % O2 Device: None (Room air)

## 2024-05-12 NOTE — PLAN OF CARE
Problem: Sleep Disturbance  Goal: Adequate Sleep/Rest  Outcome: Progressing   Goal Outcome Evaluation:  Patient appears to have slept for 7 hours. No acute events during the night. No complaints of pain or requests for prn's. Safety checks un place.

## 2024-05-13 PROCEDURE — 99232 SBSQ HOSP IP/OBS MODERATE 35: CPT | Mod: GC | Performed by: PSYCHIATRY & NEUROLOGY

## 2024-05-13 PROCEDURE — 250N000013 HC RX MED GY IP 250 OP 250 PS 637: Performed by: PSYCHIATRY & NEUROLOGY

## 2024-05-13 PROCEDURE — 250N000013 HC RX MED GY IP 250 OP 250 PS 637: Performed by: STUDENT IN AN ORGANIZED HEALTH CARE EDUCATION/TRAINING PROGRAM

## 2024-05-13 PROCEDURE — 250N000013 HC RX MED GY IP 250 OP 250 PS 637

## 2024-05-13 PROCEDURE — 124N000002 HC R&B MH UMMC

## 2024-05-13 RX ORDER — HYDROXYZINE HYDROCHLORIDE 25 MG/1
25 TABLET, FILM COATED ORAL 2 TIMES DAILY PRN
Qty: 60 TABLET | Refills: 0 | Status: SHIPPED | OUTPATIENT
Start: 2024-05-13

## 2024-05-13 RX ORDER — FLUOXETINE 40 MG/1
40 CAPSULE ORAL AT BEDTIME
Qty: 30 CAPSULE | Refills: 0 | Status: SHIPPED | OUTPATIENT
Start: 2024-05-13 | End: 2024-09-26

## 2024-05-13 RX ORDER — PRAZOSIN HYDROCHLORIDE 1 MG/1
3 CAPSULE ORAL AT BEDTIME
Qty: 30 CAPSULE | Refills: 0 | Status: SHIPPED | OUTPATIENT
Start: 2024-05-13 | End: 2024-09-26

## 2024-05-13 RX ORDER — ARIPIPRAZOLE 10 MG/1
10 TABLET ORAL AT BEDTIME
Qty: 30 TABLET | Refills: 0 | Status: SHIPPED | OUTPATIENT
Start: 2024-05-13

## 2024-05-13 RX ADMIN — HYDROXYZINE HYDROCHLORIDE 25 MG: 25 TABLET, FILM COATED ORAL at 17:17

## 2024-05-13 RX ADMIN — PRAZOSIN HYDROCHLORIDE 3 MG: 2 CAPSULE ORAL at 20:45

## 2024-05-13 RX ADMIN — ACETAMINOPHEN 325 MG: 325 TABLET, FILM COATED ORAL at 14:10

## 2024-05-13 RX ADMIN — ARIPIPRAZOLE 10 MG: 10 TABLET ORAL at 20:45

## 2024-05-13 RX ADMIN — FLUOXETINE 40 MG: 20 CAPSULE ORAL at 20:44

## 2024-05-13 ASSESSMENT — ACTIVITIES OF DAILY LIVING (ADL)
ADLS_ACUITY_SCORE: 43
ADLS_ACUITY_SCORE: 43
ORAL_HYGIENE: INDEPENDENT
ADLS_ACUITY_SCORE: 43
DRESS: INDEPENDENT
ADLS_ACUITY_SCORE: 43
HYGIENE/GROOMING: INDEPENDENT
ADLS_ACUITY_SCORE: 43

## 2024-05-13 NOTE — PROGRESS NOTES
"  ----------------------------------------------------------------------------------------------------------  [facility]  Psychiatry Progress Note  Hospital Day #20     Interim History:     The patient's care was discussed with the treatment team and chart notes were reviewed.    Vitals: Temp: 97.3  F (36.3  C) Temp src: Oral BP: (!) 130/91 Pulse: 80     SpO2: 97 %       Hypertensive to 140s, otherwise VSS  Sleep: 7 hours (05/13/24 0652)  Scheduled medications: Took all scheduled medications as prescribed  Psychiatric PRN medications: None    Staff Report:  No acute concerns over the weekend, no behavioral concerns noted. He is noted to be doing better. He is social with other patients. He had a couple visitors over the weekend. Patient is eating well. See staff notes for additional notes.       Subjective:     Patient Interview:  Pj was seen in his room, he was laying down on approach but awoke as the team entered. Patient reports that he is excited for his IRTS interview today. He liked when his parents visited him this weekend; when prompted, he reports he doesn't feel skeptical about it. Patient reports feeling excited for his IRTS interview today, although he mentions feeling anxious about this.   Patient has been sleeping well. He had a nightmare over the weekend.   He reports that his nightmare frequency has remained the same since the increase in prazosin last week. He states that his nightmares are related to past trauma. He reports he doesn't seen any shadows in the peripherpy. He states that he easily startles from noises; he attributes the change in environment as a potential contributor to his nightmares. His eye is doing better today.       ROS:  Patient denies acute concerns      Objective:     Vitals:  BP (!) 130/91   Pulse 80   Temp 97.3  F (36.3  C) (Oral)   Resp 16   Ht 1.727 m (5' 8\")   Wt 113.4 kg (250 lb)   SpO2 97%   BMI 38.01 kg/m  "     Allergies:  No Known Allergies    Current Medications:  Scheduled:  Current Facility-Administered Medications   Medication Dose Route Frequency Provider Last Rate Last Admin    acetaminophen (TYLENOL) tablet 325 mg  325 mg Oral Q4H PRN Tyrese Moe MD   325 mg at 05/08/24 1750    ARIPiprazole (ABILIFY) tablet 10 mg  10 mg Oral At Bedtime Johnny Jay MD   10 mg at 05/12/24 2129    FLUoxetine (PROzac) capsule 40 mg  40 mg Oral At Bedtime Linette Bello MD   40 mg at 05/12/24 2129    hydrOXYzine HCl (ATARAX) tablet 25 mg  25 mg Oral BID PRN Tyrese Moe MD   25 mg at 05/08/24 1624    OLANZapine (zyPREXA) tablet 5 mg  5 mg Oral TID PRN Tyrese Moe MD        Or    OLANZapine (zyPREXA) injection 5 mg  5 mg Intramuscular TID PRN Tyrese Moe MD        ondansetron (ZOFRAN) tablet 4 mg  4 mg Oral Q6H PRN Lydia Dhaliwal MD   4 mg at 04/29/24 1800    polyethylene glycol (MIRALAX) Packet 17 g  17 g Oral Daily PRN Tyrese Moe MD        polyethylene glycol-propylene glycol PF (SYSTANE ULTRA PF) opthalmic solution 1 drop  1 drop Both Eyes Q1H PRN Johnny Jay MD   1 drop at 04/28/24 1702    prazosin (MINIPRESS) capsule 3 mg  3 mg Oral At Bedtime Linette Bello MD   3 mg at 05/12/24 2129       PRN:  Current Facility-Administered Medications   Medication Dose Route Frequency Provider Last Rate Last Admin    acetaminophen (TYLENOL) tablet 325 mg  325 mg Oral Q4H PRN Tyrese Moe MD   325 mg at 05/08/24 1750    ARIPiprazole (ABILIFY) tablet 10 mg  10 mg Oral At Bedtime Johnny Jay MD   10 mg at 05/12/24 2129    FLUoxetine (PROzac) capsule 40 mg  40 mg Oral At Bedtime Linette Bello MD   40 mg at 05/12/24 2129    hydrOXYzine HCl (ATARAX) tablet 25 mg  25 mg Oral BID PRN Tyrese Moe MD   25 mg at 05/08/24 1624    OLANZapine (zyPREXA) tablet 5 mg  5 mg Oral TID PRN Tyrese Moe MD        Or    OLANZapine (zyPREXA) injection 5 mg  5 mg Intramuscular TID  "PRN Tyrese Moe MD        ondansetron (ZOFRAN) tablet 4 mg  4 mg Oral Q6H PRN Lydia Dhaliwal MD   4 mg at 04/29/24 1800    polyethylene glycol (MIRALAX) Packet 17 g  17 g Oral Daily PRN Tyrese Moe MD        polyethylene glycol-propylene glycol PF (SYSTANE ULTRA PF) opthalmic solution 1 drop  1 drop Both Eyes Q1H PRN Johnny Jay MD   1 drop at 04/28/24 1702    prazosin (MINIPRESS) capsule 3 mg  3 mg Oral At Bedtime Linette Bello MD   3 mg at 05/12/24 2129       Labs and Imaging:  New results:   No results found for this or any previous visit (from the past 24 hour(s)).    Data this admission:  - CBC unremarkable  - CMP unremarkable  - TSH normal  - UDS negative  - Hgb A1c normal, 5.3  - Lipids remarkable for low HDL cholesterol, high LDL cholesterol  - EKG normal sinus rhythm, QTc 427     Mental Status Exam:     Oriented to:  Grossly Oriented  General:  Awake and Alert  Appearance:  appears stated age and Grooming is adequate  Behavior/Attitude:  Calm, Cooperative, Guarded, and Open  Eye Contact: Appropriate  Psychomotor: Normal and No evidence of tics, dystonia, or tardive dyskinesia  no catatonia present  Speech:  appropriate volume/tone, paucity, and hesitant  Language: Fluent in English with appropriate syntax and vocabulary.  Mood:  \"excited but anxious\"  Affect:  appropriate, was observed to be smiling during the interview with a brighter affect  Thought Process:  linear and coherent  Thought Content:   suicidal ideation with plan (without intent), No HI, No VH, and No AH; No apparent delusions  Associations:  intact  Insight:  fair due to willingness to engage in treatment  Judgment:  fair due to voluntary presentation  Impulse control: partial  Attention Span:  grossly intact and adequate for conversation  Concentration:  grossly intact  Recent and Remote Memory:  not formally assessed  Fund of Knowledge: estimated below average  Muscle Strength and Tone:  not formally assessed  Gait " and Station: Normal     Psychiatric Assessment     Pj Tucker is a 29 year old male previously diagnosed with MDD, ADHD, autism spectrum disorder who presented voluntarily by himself with suicidal thoughts in the context of increased psychosocial stressors, medication non-adherence. No prior psychiatric hospitalizations. Significant symptoms on admission include suicidal ideation with plan, increased impulsivity. The MSE on admission was pertinent for dysphoric but reactive affect, linear and goal-directed in thought. Biological contributions to presentation include diagnoses of autism spectrum disorder, medication non-adherence. Psychological contributions to presentation include diagnoses of ADHD, MDD as well as insight, coping, personality.  Social factors contributing to presentation include financial stressors, communication challenges. Protective factors include willingness to engage in treatment, lack of prior suicide attempts/SIB.      The patient's reported symptoms of suicidal ideation in the context of recent psychosocial stressors are consistent with depressive disorder, most likely major depressive episode. Differential diagnosis includes unspecified depressive disorder, adjustment disorder, intrusive thoughts secondary to OCD.  He will likely benefit from medication management, stabilization and observation this admission.     Given that he currently has SI, patient warrants inpatient psychiatric hospitalization to maintain his safety.      Psychiatric Plan by Diagnosis      Today's changes:  - none       # Suicidal Ideation with plan   # SIB with plan   # MDD, recurrent, severe  - Aripiprazole 7.5 mg PO qHS  - Fluoxetine 40 mg PO qAM  - Prazosin 3 mg PO at bedtime    # Unspecified anxiety disorder  - Hydroxyzine 25 mg PO TID PRN (for anxiety)    # ASD  # ADHD  -No medications at this time     Pertinent Labs/Monitoring:   - QTc 427 ms     Additional Plans:  - Patient will be treated in therapeutic  milieu with appropriate individual and group therapies as described       Psychiatric Hospital Course:      Pj Tucker was admitted to Station 20 as a voluntary patient. PTA aripiprazole 5mg, fluoxetine 20mg, prazosin 2mg, and hydroxyzine 25mg were continued. For medication optimization, aripiprazole was increased to 10mg po to target mood stabilization effects. Fluoxetine was increased to 40mg for medication optimization along with Prazosin to 3mg. PTA lamotrigine was discontinued given little benefit in symptoms control.     The risks, benefits, alternatives, and side effects were discussed and understood by the patient.     Medical Assessment and Plan     Medical diagnoses to be addressed this admission:  N/A    Medical course: Patient was physically examined by the ED prior to being transferred to the unit and was found to be medically stable and appropriate for admission.     Consults: medicine     Checklist     Legal Status: Voluntary     Safety Assessment:   Behavioral Orders   Procedures    Code 1 - Restrict to Unit    Psychological Testing     Request for Dr. Gore    Routine Programming     As clinically indicated    Self Injury Precaution    Status 15     Every 15 minutes.    Suicide precautions: Suicide Risk: MODERATE; Clinical rationale to override score: modification to the care environment, lack of access to a plan for self-harm     Patients on Suicide Precautions should have a Combination Diet ordered that includes a Diet selection(s) AND a Behavioral Tray selection for Safe Tray - with utensils, or Safe Tray - NO utensils       Order Specific Question:   Suicide Risk     Answer:   MODERATE     Order Specific Question:   Clinical rationale to override score:     Answer:   modification to the care environment     Order Specific Question:   Clinical rationale to override score:     Answer:   lack of access to a plan for self-harm       Risk Assessment:  Risk for harm is elevated.  Risk factors:  SI, impulsive, and psychosocial stressors  Protective factors: engaged in treatment     SIO: none    Disposition: Pending stabilization, medication optimization, & development of a safe discharge plan.    Attestation:  Cait Crespo, MS3    I was present with the medical student who participated in the service and documentation of the note. I have verified the history and personally performed the physical/mental status exam and medical decision making. I agree with the assessment and plan documented in the note. Patient seen and discussed with attending physician, Dr. Linette Bello, who is in agreement with my assessment and plan.    Sarah Ogden D.O.  Psychiatry Resident  [facility]     Attestation:  This patient has been seen and evaluated by me, Linette Bello MD.  I have discussed this patient with the house staff team including the resident and medical student and I agree with the findings and plan in this note.    I have reviewed today's vital signs, medications, labs and imaging. Linette Bello MD , PhD.

## 2024-05-13 NOTE — PLAN OF CARE
Problem: Adult Behavioral Health Plan of Care  Goal: Optimized Coping Skills in Response to Life Stressors  Intervention: Promote Effective Coping Strategies  Recent Flowsheet Documentation  Taken 5/13/2024 0851 by Nicole Vizcaino RN  Supportive Measures: active listening utilized     Problem: Suicide Risk  Goal: Absence of Self-Harm  Intervention: Assess Risk to Self and Maintain Safety  Recent Flowsheet Documentation  Taken 5/13/2024 0856 by Nicole Vizcaino RN  Enhanced Safety Measures: review medications for side effects with activity  Taken 5/13/2024 0851 by Nicole Vizcaino RN  Behavior Management: behavioral plan reviewed  Self-Harm Prevention: environment modified for self-harm risk  Enhanced Safety Measures: review medications for side effects with activity  Intervention: Promote Psychosocial Wellbeing  Recent Flowsheet Documentation  Taken 5/13/2024 0851 by Nicole Vizcaino RN  Supportive Measures: active listening utilized  Sleep/Rest Enhancement:   relaxation techniques promoted   regular sleep/rest pattern promoted   noise level reduced   consistent schedule promoted  Family/Support System Care:   involvement promoted   presence promoted   self-care encouraged   support provided  Intervention: Establish Safety Plan and Continuity of Care  Recent Flowsheet Documentation  Taken 5/13/2024 0856 by Nicole Vizcaino RN  Safe Transition Promotion: protective factors promoted  Taken 5/13/2024 0851 by Nicole Vizcaino RN  Safe Transition Promotion: protective factors promoted     Problem: Depression  Goal: Improved Mood  Intervention: Monitor and Manage Depressive Symptoms  Recent Flowsheet Documentation  Taken 5/13/2024 0851 by Nicole Vizcaino RN  Supportive Measures: active listening utilized  Family/Support System Care:   involvement promoted   presence promoted   self-care encouraged   support provided     Problem: Anxiety  Goal: Anxiety Reduction or Resolution  Intervention: Promote Anxiety Reduction  Recent  Flowsheet Documentation  Taken 5/13/2024 0851 by Nicole Vizcaino RN  Supportive Measures: active listening utilized  Family/Support System Care:   involvement promoted   presence promoted   self-care encouraged   support provided     Problem: Suicidal Behavior  Goal: Suicidal Behavior is Absent or Managed  Intervention: Provide Immediate and Ongoing Protective Physical Environment  Recent Flowsheet Documentation  Taken 5/13/2024 0856 by Nicole Vizcaino RN  Safe Transition Promotion: protective factors promoted  Taken 5/13/2024 0851 by Nicole Vizcaino RN  Safe Transition Promotion: protective factors promoted     Problem: Sleep Disturbance  Goal: Adequate Sleep/Rest  Intervention: Promote Sleep/Rest  Recent Flowsheet Documentation  Taken 5/13/2024 0851 by Nicole Vizcaino RN  Sleep/Rest Enhancement:   relaxation techniques promoted   regular sleep/rest pattern promoted   noise level reduced   consistent schedule promoted     Problem: Psychotic Signs/Symptoms  Goal: Increased Participation and Engagement (Psychotic Signs/Symptoms)  Intervention: Facilitate Participation and Engagement  Recent Flowsheet Documentation  Taken 5/13/2024 0851 by Nicole Vizcaino RN  Supportive Measures: active listening utilized  Goal: Improved Mood Symptoms (Psychotic Signs/Symptoms)  Intervention: Optimize Emotion and Mood  Recent Flowsheet Documentation  Taken 5/13/2024 0851 by Nicole Vizcaino RN  Supportive Measures: active listening utilized     Problem: Pain Acute  Goal: Optimal Pain Control and Function  Intervention: Prevent or Manage Pain  Recent Flowsheet Documentation  Taken 5/13/2024 0851 by Nicole Vizcaino RN  Sleep/Rest Enhancement:   relaxation techniques promoted   regular sleep/rest pattern promoted   noise level reduced   consistent schedule promoted  Intervention: Optimize Psychosocial Wellbeing  Recent Flowsheet Documentation  Taken 5/13/2024 0851 by Nicole Vizcaino RN  Supportive Measures: active listening utilized     Problem:  Comorbidity Management  Goal: Maintenance of COPD Symptom Control  Intervention: Maintain COPD Symptom Control  Recent Flowsheet Documentation  Taken 5/13/2024 0851 by Nicole Vizcaino RN  Supportive Measures: active listening utilized  Goal: Maintenance of Osteoarthritis Symptom Control  Intervention: Maintain Osteoarthritis Symptom Control  Recent Flowsheet Documentation  Taken 5/13/2024 0856 by Nicole Vizcaino RN  Activity Management: up ad ml     Problem: Anxiety  Goal: Anxiety Reduction or Resolution  Intervention: Promote Anxiety Reduction  Recent Flowsheet Documentation  Taken 5/13/2024 0851 by Nicole Vizcaino RN  Supportive Measures: active listening utilized  Family/Support System Care:   involvement promoted   presence promoted   self-care encouraged   support provided     Problem: Psychotic Signs/Symptoms  Goal: Increased Participation and Engagement (Psychotic Signs/Symptoms)  Intervention: Facilitate Participation and Engagement  Recent Flowsheet Documentation  Taken 5/13/2024 0851 by Nicole Vizcaino RN  Supportive Measures: active listening utilized  Goal: Improved Mood Symptoms (Psychotic Signs/Symptoms)  Intervention: Optimize Emotion and Mood  Recent Flowsheet Documentation  Taken 5/13/2024 0851 by Nicole Vizcaino RN  Supportive Measures: active listening utilized     Problem: Sleep Disturbance  Goal: Adequate Sleep/Rest  Intervention: Promote Sleep/Rest  Recent Flowsheet Documentation  Taken 5/13/2024 0851 by Nicole Vizcaino RN  Sleep/Rest Enhancement:   relaxation techniques promoted   regular sleep/rest pattern promoted   noise level reduced   consistent schedule promoted   Goal Outcome Evaluation:  Pj has been visible in the milieu.Sociable with select peers.Watched movies and TV in the lounge.No escalation of behaviors.No safety concerns.No scheduled medication this shift. PRN Tylenol given this shift.Patient complained of a headache but got relief.Denies all psych symptoms.Patient had uneventful  day.Contracted for safety.  Temp: 97.1  F (36.2  C) Temp src: Temporal BP: (!) 130/91 Pulse: 80     SpO2: 97 % O2 Device: None (Room air)

## 2024-05-13 NOTE — PLAN OF CARE
BEH IP Unit Acuity Rating Score (UARS)  Patient is given one point for every criteria they meet.     CRITERIA SCORING   On a 72 hour hold, court hold, committed, stay of commitment, or revocation. 0    Patient LOS on BEH unit exceeds 20 days. 0  LOS: 20   Patient under guardianship, 55+, otherwise medically complex, or under age 11. 0   Suicide ideation without relief of precipitating factors. 1   Current plan for suicide. 0   Current plan for homicide. 0   Imminent risk or actual attempt to seriously harm another without relief of factors precipitating the attempt. 0   Severe dysfunction in daily living (ex: complete neglect for self care, extreme disruption in vegetative function, extreme deterioration in social interactions). 1   Recent (last 7 days) or current physical aggression in the ED or on unit. 0   Restraints or seclusion episode in past 72 hours. 0   Recent (last 7 days) or current verbal aggression, agitation, yelling, etc., while in the ED or unit. 0   Active psychosis. 0   Need for constant or near constant redirection (from leaving, from others, etc).  0   Intrusive or disruptive behaviors. 0   Patient requires 3 or more hours of individualized nursing care per 8-hour shift (i.e. for ADLs, meds, therapeutic interventions). 0   TOTAL 2

## 2024-05-13 NOTE — PLAN OF CARE
Problem: Sleep Disturbance  Goal: Adequate Sleep/Rest  Outcome: Progressing   Goal Outcome Evaluation:  Patient was observed to have slept for 7 hours. No acute events during the shift. No noted signs of pain during routine safety checks. No requests or administered prn's.

## 2024-05-13 NOTE — PLAN OF CARE
Assessment/Intervention/Current Symtoms and Care Coordination:  Chart reviewed and patient met with team,   Discussed patient progress, symptomology, and response to treatment.  Discussed the discharge plan and any potential impediments to discharge.     Patient met with team. Patient continues to appear brighter and does acknowledge that he is feeling slightly better.  Patient continues to be observed smiling intermittently.  Meds continue to be monitored. Patient thinks meds are fine therefore no changes being made today.  Patient has remained compliant.  Minimal group attendance however he does engage with peers playing chess/games.     Patient has telephone interview for Gricelda GUAJARDO today at 1:00pm. Patient reminded of this.  Writer will continue to work closely with [facility] to coordinate care.     Discharge Plan or Goal:  PLacement- IRTS  Psychiatry  Therapy     Barriers to Discharge:  Ongoing depression/SI  Placement- patient is homeless  Lack of insurance 5/1/24     Referral Status:  Gricelda GUAJARDO - interview 5/13/24     Legal Status:  Voluntary     Contacts:  Psychiatrist: Jazmin JASON  [facility] -[address] , [place] [address] [phone]     Primary Physician: [facility] Marie/Rsvl     Therapist: Naomi Schoenberg @ [facility] - [address] , [place] [address] [phone]     [facility] : Elbert Heard: [phone]  [facility] ([facility])     Upcoming Meetings and Dates/Important Information and next steps:  Treatment update  Wed

## 2024-05-13 NOTE — PLAN OF CARE
"  Problem: Anxiety  Goal: Anxiety Reduction or Resolution  Outcome: Progressing  Intervention: Promote Anxiety Reduction  Recent Flowsheet Documentation  Taken 5/13/2024 1755 by Mauricio George, RN  Supportive Measures:   active listening utilized   verbalization of feelings encouraged   self-reflection promoted   self-care encouraged  Family/Support System Care:   presence promoted   self-care encouraged  Taken 5/13/2024 1700 by Mauricio George, RN  Supportive Measures:   positive reinforcement provided   problem-solving facilitated   relaxation techniques promoted  Family/Support System Care:   involvement promoted   presence promoted   self-care encouraged     Problem: Psychotic Signs/Symptoms  Goal: Improved Behavioral Control (Psychotic Signs/Symptoms)  Outcome: Progressing   Goal Outcome Evaluation:    Plan of Care Reviewed With: patient      Patient  worried about his discharge tomorrow. Denied all psych symptoms,but told writer he was \" somewhat concern and anxious\" about his discharge. \"I feel anxious not because of my mental illness, but because I don't know what to expect, once I discharge from here tomorrow,\" he said. He was nevertheless visible in the milieu through the shift and interacted appropriately with peers and staff.  No abnormalities in behavior were noted and patient  was compliant with medications and ADLs. Will continue to monitor and encourage.      "
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
## 2024-05-13 NOTE — PLAN OF CARE
Pt has been visible in the milieu, he is selectively social with peers. Pt denies all mental health symptoms upon assessment. He is med compliant without issue, no PRNs received. Hygiene and intake are adequate, no other concerns at this time.     Problem: Anxiety  Goal: Anxiety Reduction or Resolution  Outcome: Progressing   Goal Outcome Evaluation:    Plan of Care Reviewed With: patient

## 2024-05-14 PROCEDURE — 90853 GROUP PSYCHOTHERAPY: CPT

## 2024-05-14 PROCEDURE — 250N000013 HC RX MED GY IP 250 OP 250 PS 637: Performed by: PSYCHIATRY & NEUROLOGY

## 2024-05-14 PROCEDURE — 124N000002 HC R&B MH UMMC

## 2024-05-14 PROCEDURE — 250N000013 HC RX MED GY IP 250 OP 250 PS 637: Performed by: STUDENT IN AN ORGANIZED HEALTH CARE EDUCATION/TRAINING PROGRAM

## 2024-05-14 PROCEDURE — 99232 SBSQ HOSP IP/OBS MODERATE 35: CPT | Mod: GC | Performed by: PSYCHIATRY & NEUROLOGY

## 2024-05-14 RX ADMIN — FLUOXETINE 40 MG: 20 CAPSULE ORAL at 20:38

## 2024-05-14 RX ADMIN — ARIPIPRAZOLE 10 MG: 10 TABLET ORAL at 20:39

## 2024-05-14 RX ADMIN — PRAZOSIN HYDROCHLORIDE 3 MG: 2 CAPSULE ORAL at 20:37

## 2024-05-14 ASSESSMENT — ACTIVITIES OF DAILY LIVING (ADL)
ADLS_ACUITY_SCORE: 43
ADLS_ACUITY_SCORE: 43
ORAL_HYGIENE: INDEPENDENT
ADLS_ACUITY_SCORE: 43
DRESS: INDEPENDENT
ADLS_ACUITY_SCORE: 43
HYGIENE/GROOMING: INDEPENDENT
ADLS_ACUITY_SCORE: 43
HYGIENE/GROOMING: INDEPENDENT
DRESS: INDEPENDENT
ADLS_ACUITY_SCORE: 43
ADLS_ACUITY_SCORE: 43
LAUNDRY: WITH SUPERVISION
ADLS_ACUITY_SCORE: 43
ORAL_HYGIENE: INDEPENDENT

## 2024-05-14 NOTE — PLAN OF CARE
"Pt  is intermittently visible in the milieu. Pleasant and cooperative, pt is medication compliant. Oral food intake is adequate. Pt took shower in the morning and was awaiting for his discharge. Pt mood is \"pretty good\" . He was excited for the discharge.  Anxiety was a 5/10 and depression at 4/10. He said that he is anxious for his discharge. Pt denied Hallucinations. Endorsed SI - but said that its not as frequent as before, contracts for safety. Pt did not go to any groups but was observed to be making and receiving phone calls.     Family/Support System Care:   involvement promoted   presence promoted   self-care encouraged     Problem: Psychotic Signs/Symptoms  Goal: Improved Behavioral Control (Psychotic Signs/Symptoms)  Outcome: Progressing  Goal: Improved Mood Symptoms (Psychotic Signs/Symptoms)  Outcome: Progressing  Intervention: Optimize Emotion and Mood  Recent Flowsheet Documentation  Taken 5/14/2024 1200 by Amber Zhneg RN  Supportive Measures:   positive reinforcement provided   problem-solving facilitated   relaxation techniques promoted  Diversional Activity:   television   music  Goal: Improved Sleep (Psychotic Signs/Symptoms)  Outcome: Progressing     Problem: Sleep Disturbance  Goal: Adequate Sleep/Rest  Outcome: Progressing   Goal Outcome Evaluation:    Plan of Care Reviewed With: patient                   "

## 2024-05-14 NOTE — PLAN OF CARE
Number of patient:  4  Group Length:  1 Hours  Group Psychotherapy      Summary of Group Psychotherapy:  The group psychotherapy session assisted the participant to promote insight to positive choice and change. Group psychotherapy processing was within a supportive and safe environment. Patients processed emotions using verbal group and expressive psychotherapy interventions, with guidance from the psychotherapist.     Assessment: This session focused on orienting patients to become aware of their own psychomotor processes, especially after they have been discharged from the hospital. The psychotherapist took time to demonstrate multiple ways in which deviations from normal/regular psychomotor regimens could occur, and how patients could track these changes and report them timely to their nurses, psychiatrists, and . Reviewed specific psychomotor risks with psychosis and delusions; how to track them and how to use resources (helpful persons and professionals) to prevent full-blown crises, and to guarantee safety. Patients engaged in a guided process where they brainstormed on ways to communicate with their care providers and support systems around these, as well as how to pay attention to individual changes. Also reviewed possible effects of drugs and non-prescribed substances to the psychomotor processes, and ways to ensure that shame is not a reason for not making contact with care providers in order to prevent a crisis.    Patient Response: This patient participated in this psychotherapy session. Stayed alert and actively engaged. Asked questions and responded to questions. Pt asked questions for clarifications on autism spectrum disorders and received responses on those.      Ben Jerez, Ph.D., [facility]

## 2024-05-14 NOTE — PLAN OF CARE
BEH IP Unit Acuity Rating Score (UARS)  Patient is given one point for every criteria they meet.     CRITERIA SCORING   On a 72 hour hold, court hold, committed, stay of commitment, or revocation. 0    Patient LOS on BEH unit exceeds 20 days. 1  LOS: 21   Patient under guardianship, 55+, otherwise medically complex, or under age 11. 0   Suicide ideation without relief of precipitating factors. 0   Current plan for suicide. 0   Current plan for homicide. 0   Imminent risk or actual attempt to seriously harm another without relief of factors precipitating the attempt. 0   Severe dysfunction in daily living (ex: complete neglect for self care, extreme disruption in vegetative function, extreme deterioration in social interactions). 1   Recent (last 7 days) or current physical aggression in the ED or on unit. 0   Restraints or seclusion episode in past 72 hours. 0   Recent (last 7 days) or current verbal aggression, agitation, yelling, etc., while in the ED or unit. 0   Active psychosis. 0   Need for constant or near constant redirection (from leaving, from others, etc).  0   Intrusive or disruptive behaviors. 0   Patient requires 3 or more hours of individualized nursing care per 8-hour shift (i.e. for ADLs, meds, therapeutic interventions). 0   TOTAL 2

## 2024-05-14 NOTE — PLAN OF CARE
Problem: Anxiety  Goal: Anxiety Reduction or Resolution  Outcome: Progressing  Intervention: Promote Anxiety Reduction  Recent Flowsheet Documentation  Taken 5/14/2024 1700 by Mauricio George RN  Supportive Measures: active listening utilized  Family/Support System Care:   involvement promoted   presence promoted   self-care encouraged   support provided     Problem: Psychotic Signs/Symptoms  Goal: Improved Behavioral Control (Psychotic Signs/Symptoms)  Outcome: Progressing   Goal Outcome Evaluation:    Plan of Care Reviewed With: patient      No psychosis or abnormal behavior noted. Patient has been calm,cooperative, and engaged. He was present in the milieu majority of the shift watching movies and playing chess with peers. Mentation was excellent as evidence by patient being alert and oriented x4. His vitals were normal and patient was compliant with medication regimen. Affect however remained flat and guarded, but patient was pleasant and respectfully on approach. He denied all psych symptoms.

## 2024-05-14 NOTE — PROGRESS NOTES
"  ----------------------------------------------------------------------------------------------------------  [facility]  Psychiatry Progress Note  Hospital Day #21     Interim History:     The patient's care was discussed with the treatment team and chart notes were reviewed.    Vitals: Temp: 97.8  F (36.6  C) Temp src: Temporal BP: 122/86 Pulse: 83   Resp: 16 SpO2: 96 % O2 Device: None (Room air)     Hypertensive to 140s, otherwise VSS  Sleep: 7 hours (05/14/24 0705)  Scheduled medications: Took all scheduled medications as prescribed  Psychiatric PRN medications:   Last 24H PRN:     acetaminophen (TYLENOL) tablet 325 mg, 325 mg at 05/13/24 1410    hydrOXYzine HCl (ATARAX) tablet 25 mg, 25 mg at 05/13/24 1717None    Staff Report:  No acute concerns over night. Patient reported having anxiety over discharge last night. No PRN was given. See staff notes for additional notes.       Subjective:     Patient Interview:  Pj was seen by the team in his room, he was laying down on approach but awoke as the team entered. He reports being saddened that his discharge wasn't happening today. He states \"wanted to leave today\". He reports his mood is fine today, and endorses feeling better than when he was admitted. He states sleep has been fine. He reports he has been tolerating his medications well.     ROS:  Patient denies acute concerns      Objective:     Vitals:  /86   Pulse 83   Temp 97.8  F (36.6  C) (Temporal)   Resp 16   Ht 1.727 m (5' 8\")   Wt 113.4 kg (250 lb)   SpO2 96%   BMI 38.01 kg/m      Allergies:  No Known Allergies    Current Medications:  Scheduled:  Current Facility-Administered Medications   Medication Dose Route Frequency Provider Last Rate Last Admin    acetaminophen (TYLENOL) tablet 325 mg  325 mg Oral Q4H PRN Safi-Lcam, Tyrese, MD   325 mg at 05/13/24 1410    ARIPiprazole (ABILIFY) tablet 10 mg  10 mg Oral At Bedtime Johnny Jay MD   " 10 mg at 05/13/24 2045    FLUoxetine (PROzac) capsule 40 mg  40 mg Oral At Bedtime Linette Bello MD   40 mg at 05/13/24 2044    hydrOXYzine HCl (ATARAX) tablet 25 mg  25 mg Oral BID PRN Tyrese Moe MD   25 mg at 05/13/24 1717    OLANZapine (zyPREXA) tablet 5 mg  5 mg Oral TID PRN Tyrese Moe MD        Or    OLANZapine (zyPREXA) injection 5 mg  5 mg Intramuscular TID PRN Tyrese Moe MD        ondansetron (ZOFRAN) tablet 4 mg  4 mg Oral Q6H PRN Lydia Dhaliwal MD   4 mg at 04/29/24 1800    polyethylene glycol (MIRALAX) Packet 17 g  17 g Oral Daily PRN Tyrese Moe MD        polyethylene glycol-propylene glycol PF (SYSTANE ULTRA PF) opthalmic solution 1 drop  1 drop Both Eyes Q1H PRN Johnny Jay MD   1 drop at 04/28/24 1702    prazosin (MINIPRESS) capsule 3 mg  3 mg Oral At Bedtime Linette Bello MD   3 mg at 05/13/24 2045       PRN:  Current Facility-Administered Medications   Medication Dose Route Frequency Provider Last Rate Last Admin    acetaminophen (TYLENOL) tablet 325 mg  325 mg Oral Q4H PRN Tyrese Moe MD   325 mg at 05/13/24 1410    ARIPiprazole (ABILIFY) tablet 10 mg  10 mg Oral At Bedtime Johnny Jay MD   10 mg at 05/13/24 2045    FLUoxetine (PROzac) capsule 40 mg  40 mg Oral At Bedtime Linette Bello MD   40 mg at 05/13/24 2044    hydrOXYzine HCl (ATARAX) tablet 25 mg  25 mg Oral BID PRN Tyrese Moe MD   25 mg at 05/13/24 1717    OLANZapine (zyPREXA) tablet 5 mg  5 mg Oral TID PRN Tyrese Moe MD        Or    OLANZapine (zyPREXA) injection 5 mg  5 mg Intramuscular TID PRN Tyrese Moe MD        ondansetron (ZOFRAN) tablet 4 mg  4 mg Oral Q6H PRN Lydia Dhaliwal MD   4 mg at 04/29/24 1800    polyethylene glycol (MIRALAX) Packet 17 g  17 g Oral Daily PRN Tyrese Moe MD        polyethylene glycol-propylene glycol PF (SYSTANE ULTRA PF) opthalmic solution 1 drop  1 drop Both Eyes Q1H PRN Johnny Jay MD   1 drop at  "04/28/24 1702    prazosin (MINIPRESS) capsule 3 mg  3 mg Oral At Bedtime Linette Bello MD   3 mg at 05/13/24 2045       Labs and Imaging:  New results:   No results found for this or any previous visit (from the past 24 hour(s)).    Data this admission:  - CBC unremarkable  - CMP unremarkable  - TSH normal  - UDS negative  - Hgb A1c normal, 5.3  - Lipids remarkable for low HDL cholesterol, high LDL cholesterol  - EKG normal sinus rhythm, QTc 427     Mental Status Exam:     Oriented to:  Grossly Oriented  General:  Awake and Alert  Appearance:  appears stated age and Grooming is adequate  Behavior/Attitude:  Calm, Cooperative, Guarded, and Open  Eye Contact: Appropriate  Psychomotor: Normal and No evidence of tics, dystonia, or tardive dyskinesia  no catatonia present  Speech:  appropriate volume/tone, paucity, and hesitant  Language: Fluent in English with appropriate syntax and vocabulary.  Mood:  \"Eh. Fine\"  Affect:  appropriate, was observed to be smiling during the interview with a brighter affect  Thought Process:  linear and coherent  Thought Content:   suicidal ideation with plan (without intent), No HI, No VH, and No AH; No apparent delusions  Associations:  intact  Insight:  fair due to willingness to engage in treatment  Judgment:  fair due to voluntary presentation  Impulse control: partial  Attention Span:  grossly intact and adequate for conversation  Concentration:  grossly intact  Recent and Remote Memory:  not formally assessed  Fund of Knowledge: estimated below average  Muscle Strength and Tone:  not formally assessed  Gait and Station: Normal     Psychiatric Assessment     Pj Tucker is a 29 year old male previously diagnosed with MDD, ADHD, autism spectrum disorder who presented voluntarily by himself with suicidal thoughts in the context of increased psychosocial stressors, medication non-adherence. No prior psychiatric hospitalizations. Significant symptoms on admission include suicidal " ideation with plan, increased impulsivity. The MSE on admission was pertinent for dysphoric but reactive affect, linear and goal-directed in thought. Biological contributions to presentation include diagnoses of autism spectrum disorder, medication non-adherence. Psychological contributions to presentation include diagnoses of ADHD, MDD as well as insight, coping, personality.  Social factors contributing to presentation include financial stressors, communication challenges. Protective factors include willingness to engage in treatment, lack of prior suicide attempts/SIB.      The patient's reported symptoms of suicidal ideation in the context of recent psychosocial stressors are consistent with depressive disorder, most likely major depressive episode. Differential diagnosis includes unspecified depressive disorder, adjustment disorder, intrusive thoughts secondary to OCD.  He will likely benefit from medication management, stabilization and observation this admission.     Given that he currently has SI, patient warrants inpatient psychiatric hospitalization to maintain his safety.      Psychiatric Plan by Diagnosis      Today's changes:  - none       # Suicidal Ideation with plan   # SIB with plan   # MDD, recurrent, severe  - Aripiprazole 7.5 mg PO qHS  - Fluoxetine 40 mg PO qAM  - Prazosin 3 mg PO at bedtime    # Unspecified anxiety disorder  - Hydroxyzine 25 mg PO TID PRN (for anxiety)    # ASD  # ADHD  -No medications at this time     Pertinent Labs/Monitoring:   - QTc 427 ms     Additional Plans:  - Patient will be treated in therapeutic milieu with appropriate individual and group therapies as described       Psychiatric Hospital Course:      Pj Tucker was admitted to [facility] as a voluntary patient. PTA aripiprazole 5mg, fluoxetine 20mg, prazosin 2mg, and hydroxyzine 25mg were continued. For medication optimization, aripiprazole was increased to 10mg po to target mood stabilization effects.  Fluoxetine was increased to 40mg for medication optimization along with Prazosin to 3mg. PTA lamotrigine was discontinued given little benefit in symptoms control.     The risks, benefits, alternatives, and side effects were discussed and understood by the patient.     Medical Assessment and Plan     Medical diagnoses to be addressed this admission:  N/A    Medical course: Patient was physically examined by the ED prior to being transferred to the unit and was found to be medically stable and appropriate for admission.     Consults: medicine     Checklist     Legal Status: Voluntary     Safety Assessment:   Behavioral Orders   Procedures    Code 1 - Restrict to Unit    Psychological Testing     Request for Dr. Gore    Routine Programming     As clinically indicated    Self Injury Precaution    Status 15     Every 15 minutes.    Suicide precautions: Suicide Risk: MODERATE; Clinical rationale to override score: modification to the care environment, lack of access to a plan for self-harm     Patients on Suicide Precautions should have a Combination Diet ordered that includes a Diet selection(s) AND a Behavioral Tray selection for Safe Tray - with utensils, or Safe Tray - NO utensils       Order Specific Question:   Suicide Risk     Answer:   MODERATE     Order Specific Question:   Clinical rationale to override score:     Answer:   modification to the care environment     Order Specific Question:   Clinical rationale to override score:     Answer:   lack of access to a plan for self-harm       Risk Assessment:  Risk for harm is elevated.  Risk factors: SI, impulsive, and psychosocial stressors  Protective factors: engaged in treatment     SIO: none    Disposition: Pending stabilization, medication optimization, & development of a safe discharge plan.    Attestation:  Cait Crespo, MS3    I was present with the medical student who participated in the service and documentation of the note. I have verified the  history and personally performed the physical/mental status exam and medical decision making. I agree with the assessment and plan documented in the note. Patient seen and discussed with attending physician, Dr. Linette Bello, who is in agreement with my assessment and plan.    Sarah Ogden D.O.  Psychiatry Resident  [facility]     Attestation:  This patient has been seen and evaluated by me, Linette Bello MD.  I have discussed this patient with the house staff team including the resident and medical student and I agree with the findings and plan in this note.    I have reviewed today's vital signs, medications, labs and imaging. Linette Bello MD , PhD.

## 2024-05-14 NOTE — PLAN OF CARE
Assessment/Intervention/Current Symtoms and Care Coordination:  Chart reviewed and patient met with team,   Discussed patient progress, symptomology, and response to treatment.  Discussed the discharge plan and any potential impediments to discharge.     Patient met with team. Patient has been accepted to [facility] with plan for transfer tomorrow. Patient has been approved for [facility]-EDP however he needs to make a payment before it becomes active.  [facility] funding for 1st 30 days of residential treatment  therefore [facility] can now accept him.  Writer also received confirmation that patient has been approved for ST disability through his employer.  Patient was informed of all of the above and is very pleased.    Patient will be sent via Cab to [facility] tomorrow at 9:30am.  30 day supply of meds are on the unit.  Patient has outpatient f/unit(s) appts in place.    Discharge Plan or Goal:  [facility]  Psychiatry  Therapy     Barriers to Discharge:  None- plan to discharge tomorrow AM     Referral Status:  [facility]- Psychiatry and therapy appts in place     Legal Status:  Voluntary     Contacts:  Psychiatrist: Jazmin JASON  [facility] -[address] [place] [address] , [place] [address] [phone]     Primary Physician: [facility]     Therapist: Naomi Schoenberg @ [facility] [address] , [place] [address] [phone]     [facility] : Elbert Heard: [phone]  [facility] ([facility])     Upcoming Meetings and Dates/Important Information and next steps:  Treatment update  Wed

## 2024-05-15 VITALS
SYSTOLIC BLOOD PRESSURE: 123 MMHG | RESPIRATION RATE: 15 BRPM | OXYGEN SATURATION: 97 % | BODY MASS INDEX: 37.84 KG/M2 | TEMPERATURE: 98.1 F | DIASTOLIC BLOOD PRESSURE: 88 MMHG | WEIGHT: 249.7 LBS | HEART RATE: 80 BPM | HEIGHT: 68 IN

## 2024-05-15 PROCEDURE — 250N000013 HC RX MED GY IP 250 OP 250 PS 637

## 2024-05-15 PROCEDURE — 99238 HOSP IP/OBS DSCHRG MGMT 30/<: CPT | Mod: GC | Performed by: PSYCHIATRY & NEUROLOGY

## 2024-05-15 RX ADMIN — HYDROXYZINE HYDROCHLORIDE 25 MG: 25 TABLET, FILM COATED ORAL at 08:59

## 2024-05-15 ASSESSMENT — ACTIVITIES OF DAILY LIVING (ADL)
ADLS_ACUITY_SCORE: 43

## 2024-05-15 NOTE — PLAN OF CARE
Pt discharged as per orders to [facility], picked up by cab at about 9:55 AM.     Pt was alert an oriented, pleasant and cooperative. Pt is excited and at the same time anxious about discharge. PRN Hydroxyzine was administered and pt reported alleviation of anxiety. Pt denied all other mental health symptoms, no depression, no SI and no hallucinations. Discharge paper work was discussed with pt and he did not have any questions at the time. All belongings and discharge medications given to him and all signed out for. Pt coping mechanisms are deep breathing, listening to music and reaching out to support. Her support system in place are his parents and .

## 2024-05-15 NOTE — PLAN OF CARE
BEH IP Unit Acuity Rating Score (UARS)  Patient is given one point for every criteria they meet.     CRITERIA SCORING   On a 72 hour hold, court hold, committed, stay of commitment, or revocation. 0    Patient LOS on BEH unit exceeds 20 days. 1  LOS: 22   Patient under guardianship, 55+, otherwise medically complex, or under age 11. 0   Suicide ideation without relief of precipitating factors. 0   Current plan for suicide. 0   Current plan for homicide. 0   Imminent risk or actual attempt to seriously harm another without relief of factors precipitating the attempt. 0   Severe dysfunction in daily living (ex: complete neglect for self care, extreme disruption in vegetative function, extreme deterioration in social interactions). 1   Recent (last 7 days) or current physical aggression in the ED or on unit. 0   Restraints or seclusion episode in past 72 hours. 0   Recent (last 7 days) or current verbal aggression, agitation, yelling, etc., while in the ED or unit. 0   Active psychosis. 0   Need for constant or near constant redirection (from leaving, from others, etc).  0   Intrusive or disruptive behaviors. 0   Patient requires 3 or more hours of individualized nursing care per 8-hour shift (i.e. for ADLs, meds, therapeutic interventions). 0   TOTAL 2

## 2024-05-15 NOTE — PROVIDER NOTIFICATION
05/15/24 0914   Individualization/Patient Specific Goals   Patient Personal Strengths community support;expressive of emotions;family/social support;independent living skills;interests/hobbies;medication/treatment adherence;motivated for treatment;positive vocational history;resilient   Patient Vulnerabilities adverse childhood experience(s);limited social skills   Anxieties, Fears or Concerns None   Individualized Care Needs None   Interprofessional Rounds   Summary 1. Evaluation/stabilization of mood disorder sx's 2. Safe with self- absence of SI 3. Medication mgmt per MD's 4. Coordination of care with outpatient provers 5. Housing in place 6. Psychiatric f/u care in place   Participants CTC;nursing;patient;psychiatrist   Behavioral Team Discussion   Participants Dr. Bello, Amber Zheng RN, Jazmin Khoury MA.LP,  med student   Progress Patient reports feeling better- much brighter and more engaging. Smiling more- Patient  has been compliant with meds. MA-EDP activated. Interviewed and accepted to [facility].   Anticipated length of stay discharge today   Continued Stay Criteria/Rationale N/A   Medical/Physical Stable   Precautions Per unit protocol   Plan Patient accepted to [facility] with discharge planned for this AM.  Has follow up Psychiatry and therapy in place.  Care has maria t closely coordinated with CM.   Rationale for change in precautions or plan DC today   Safety Plan Patient completed   Anticipated Discharge Disposition I[facility]     Goal Outcome Evaluation:

## 2024-05-15 NOTE — PLAN OF CARE
Goal Outcome Evaluation:Patient has been calm and cooperative with cares.Appears to have slept for 7 hours.Patient denies pain/discomfort.No Prn medication given or requested,No escalation of behaviors.No safety concerns.Patient able to make needs known.Patient likely to discharge today.

## 2024-05-17 ENCOUNTER — PATIENT OUTREACH (OUTPATIENT)
Dept: CARE COORDINATION | Facility: CLINIC | Age: 29
End: 2024-05-17
Payer: COMMERCIAL

## 2024-05-17 NOTE — PROGRESS NOTES
Clinic Care Coordination Contact  Care Team Conversations    Patient identified for care management outreach, however Juan RN staff has already followed up with patient to ensure they are following up with PCP and have needs and resources met. Clinic RN will refer back to VISH ALMEIDA if needed/appropriate.    Sandra Pettit, [facility]  Social Work Care Coordinator - [facility]  Care Coordination  [email or web address]  Cell Phone: [phone]  Gender pronouns: she/her  Employed by [employer]

## 2024-09-24 ENCOUNTER — HOSPITAL ENCOUNTER (OUTPATIENT)
Facility: CLINIC | Age: 29
Setting detail: OBSERVATION
Discharge: HOME | End: 2024-09-26
Attending: EMERGENCY MEDICINE | Admitting: PSYCHIATRY & NEUROLOGY
Payer: COMMERCIAL

## 2024-09-24 DIAGNOSIS — F33.1 MAJOR DEPRESSIVE DISORDER, RECURRENT EPISODE, MODERATE (H): ICD-10-CM

## 2024-09-24 DIAGNOSIS — R45.851 SUICIDAL IDEATION: ICD-10-CM

## 2024-09-24 DIAGNOSIS — F84.0 ACTIVE AUTISTIC DISORDER: ICD-10-CM

## 2024-09-24 DIAGNOSIS — F98.8 ATTENTION DEFICIT DISORDER, UNSPECIFIED HYPERACTIVITY PRESENCE: Chronic | ICD-10-CM

## 2024-09-24 LAB — GLUCOSE BLDC GLUCOMTR-MCNC: 92 MG/DL (ref 70–99)

## 2024-09-24 PROCEDURE — 250N000011 HC RX IP 250 OP 636: Performed by: NURSE PRACTITIONER

## 2024-09-24 PROCEDURE — 250N000013 HC RX MED GY IP 250 OP 250 PS 637: Performed by: NURSE PRACTITIONER

## 2024-09-24 PROCEDURE — 99285 EMERGENCY DEPT VISIT HI MDM: CPT

## 2024-09-24 PROCEDURE — 82962 GLUCOSE BLOOD TEST: CPT

## 2024-09-24 PROCEDURE — G0378 HOSPITAL OBSERVATION PER HR: HCPCS

## 2024-09-24 RX ORDER — ONDANSETRON 4 MG/1
4 TABLET, ORALLY DISINTEGRATING ORAL ONCE
Status: COMPLETED | OUTPATIENT
Start: 2024-09-24 | End: 2024-09-24

## 2024-09-24 RX ORDER — ACETAMINOPHEN 325 MG/1
650 TABLET ORAL EVERY 6 HOURS PRN
Status: DISCONTINUED | OUTPATIENT
Start: 2024-09-24 | End: 2024-09-26 | Stop reason: HOSPADM

## 2024-09-24 RX ORDER — ARIPIPRAZOLE 5 MG/1
10 TABLET ORAL AT BEDTIME
Status: DISCONTINUED | OUTPATIENT
Start: 2024-09-24 | End: 2024-09-26 | Stop reason: HOSPADM

## 2024-09-24 RX ORDER — TRAZODONE HYDROCHLORIDE 50 MG/1
50 TABLET, FILM COATED ORAL
Status: DISCONTINUED | OUTPATIENT
Start: 2024-09-24 | End: 2024-09-26 | Stop reason: HOSPADM

## 2024-09-24 RX ORDER — HYDROXYZINE HYDROCHLORIDE 25 MG/1
25 TABLET, FILM COATED ORAL EVERY 6 HOURS PRN
Status: DISCONTINUED | OUTPATIENT
Start: 2024-09-24 | End: 2024-09-26 | Stop reason: HOSPADM

## 2024-09-24 RX ORDER — METFORMIN HCL 500 MG
500 TABLET, EXTENDED RELEASE 24 HR ORAL 2 TIMES DAILY WITH MEALS
COMMUNITY
Start: 2024-09-10

## 2024-09-24 RX ADMIN — FLUOXETINE HYDROCHLORIDE 40 MG: 20 CAPSULE ORAL at 21:39

## 2024-09-24 RX ADMIN — PRAZOSIN HYDROCHLORIDE 3 MG: 2 CAPSULE ORAL at 21:40

## 2024-09-24 RX ADMIN — ARIPIPRAZOLE 10 MG: 5 TABLET ORAL at 21:40

## 2024-09-24 RX ADMIN — ONDANSETRON 4 MG: 4 TABLET, ORALLY DISINTEGRATING ORAL at 21:53

## 2024-09-24 RX ADMIN — HYDROXYZINE HYDROCHLORIDE 25 MG: 25 TABLET, FILM COATED ORAL at 21:40

## 2024-09-24 ASSESSMENT — ACTIVITIES OF DAILY LIVING (ADL)
ADLS_ACUITY_SCORE: 37

## 2024-09-24 ASSESSMENT — COLUMBIA-SUICIDE SEVERITY RATING SCALE - C-SSRS
3. HAVE YOU BEEN THINKING ABOUT HOW YOU MIGHT KILL YOURSELF?: YES
4. HAVE YOU HAD THESE THOUGHTS AND HAD SOME INTENTION OF ACTING ON THEM?: YES
5. HAVE YOU STARTED TO WORK OUT OR WORKED OUT THE DETAILS OF HOW TO KILL YOURSELF? DO YOU INTEND TO CARRY OUT THIS PLAN?: YES
1. IN THE PAST MONTH, HAVE YOU WISHED YOU WERE DEAD OR WISHED YOU COULD GO TO SLEEP AND NOT WAKE UP?: YES
2. HAVE YOU ACTUALLY HAD ANY THOUGHTS OF KILLING YOURSELF IN THE PAST MONTH?: YES
6. HAVE YOU EVER DONE ANYTHING, STARTED TO DO ANYTHING, OR PREPARED TO DO ANYTHING TO END YOUR LIFE?: YES

## 2024-09-24 NOTE — ED PROVIDER NOTES
Emergency Department Note      History of Present Illness     Chief Complaint   Suicidal      HPI   Pj Tucker is a 29 year old male who self here to the emergency department due to suicidal ideations.  The patient has had suicidal ideations for the past week.  He was previously evaluated at [facility] for his suicidal ideations 2 days ago.  The patient denies any recent suicide attempt but has a plan to jump off a bridge.  He denies any recent overdose.  He denies any medical concerns and states that he has been feeling good physical health.  He lives in a group home.  He denies fevers, chills, chest pain, shortness of breath, nausea, vomiting, diarrhea, abdominal pain, cough or cold symptoms.    Independent Historian   None    Review of External Notes   The [facility] note from 9/22/2024.  He was seen for suicidal ideations.    Past Medical History     Medical History and Problem List   ADHD  Major depressive disorder  Autism spectrum disorder, by history    Medications   metFORMIN (GLUCOPHAGE XR) 500 MG 24 hr tablet  ARIPiprazole (ABILIFY) 10 MG tablet  ARIPiprazole (ABILIFY) 5 MG tablet  FLUoxetine (PROZAC) 20 MG capsule  FLUoxetine (PROZAC) 40 MG capsule  hydrOXYzine HCl (ATARAX) 25 MG tablet  prazosin (MINIPRESS) 1 MG capsule  prazosin (MINIPRESS) 2 MG capsule        Surgical History   No past surgical history on file.    Physical Exam     Patient Vitals for the past 24 hrs:   BP Temp Temp src Pulse Resp SpO2   09/24/24 1550 (!) 136/94 98.6  F (37  C) Temporal 111 18 96 %     Physical Exam  Nursing note and vitals reviewed.  Constitutional:  Appears well-developed and well-nourished.   HENT:   Head:    Atraumatic.   Mouth/Throat:   Oropharynx is clear and moist. No oropharyngeal exudate.   Eyes:    Pupils are equal, round, and reactive to light.   Neck:    Normal range of motion. Neck supple.      No tracheal deviation present. No thyromegaly present.   Cardiovascular:  Normal rate, regular rhythm, no murmur    Pulmonary/Chest: Breath sounds are clear and equal without wheezes or crackles.  Abdominal:   Soft. Bowel sounds are normal. Exhibits no distension and      no mass. There is no tenderness.      There is no rebound and no guarding.   Musculoskeletal:  Exhibits no edema.   Lymphadenopathy:  No cervical adenopathy.   Psych:                        He is calm and cooperative.  He makes good eye contact.  Neurological:   Alert and oriented to person, place, and time.   Skin:    Skin is warm and dry. No rash noted. No pallor.       Diagnostics     Lab Results   Labs Ordered and Resulted from Time of ED Arrival to Time of ED Departure   GLUCOSE BY METER - Normal       Result Value    GLUCOSE BY METER POCT 92     GLUCOSE MONITOR NURSING POCT       Imaging   No orders to display       Independent Interpretation   None    ED Course      Medications Administered   Medications - No data to display    Procedures   Procedures     Discussion of Management   DEC eval ordered    ED Course   Placed patient on an DIOR due to suicidal ideations.    Additional Documentation  Stress/Adjustment Disorders and Social Connections/Isolation    Medical Decision Making / Diagnosis     CMS Diagnoses: None    MIPS       None    MDM   Pj Tucker is a 29 year old male who arrives to the emergency department due to suicidal ideations.  He denies any recent suicide attempt but has a plan to jump off a bridge.  I placed him on an DIOR.  His blood glucose is normal.  I medically cleared him and he was sent to [facility] for further mental health evaluation.    Disposition   The patient was transferred to [facility].     Diagnosis     ICD-10-CM    1. Suicidal ideation  R45.851            Discharge Medications   New Prescriptions    No medications on file         MD Jose Diaz Cheri Lee, MD  09/24/24 7943

## 2024-09-24 NOTE — ED NOTES
Pt told writer he parked in urgent care parking lot, wondered if this would be a problem. Writer called security who stated pt should call family to have his car moved to avoid clif. Writer relayed this to pt who states he can call his parents. Writer spoke again with security and they will provide him a phone to make a call for his car.     Flash Barbosa on 9/24/2024 at 6:58 PM

## 2024-09-24 NOTE — ED TRIAGE NOTES
Patient having Suicidal ideation for the past couple of days, with a plan to jump off a bridge. He was at [facility] and discharged. [place] of our EMPATH unit.  Patient agrees to be safe while here at the hospital. Patient denies any drug or alcohol use today.   Has been taking his medications as prescribed.     Triage Assessment (Adult)       Row Name 09/24/24 1553          Triage Assessment    Airway WDL WDL        Respiratory WDL    Respiratory WDL WDL        Skin Circulation/Temperature WDL    Skin Circulation/Temperature WDL WDL        Cardiac WDL    Cardiac WDL WDL        Peripheral/Neurovascular WDL    Peripheral Neurovascular WDL WDL        Cognitive/Neuro/Behavioral WDL    Cognitive/Neuro/Behavioral WDL WDL

## 2024-09-24 NOTE — ED NOTES
[facility]  ED to EMPATH Checklist:      Goal for EMPATH: Suicidality    Current Behavior: Calm and Cooperative    Safety Concerns: Suicidal, with a plan to jump off a bridge.    Legal Hold Status: [facility]    Medically Cleared by ED provider: Yes and Vital signs stable    Patient Therapeutically Searched: Therapeutic search by ED staff (strings, belts, shoes, pockets, electronics, etc.)    Belongings: In room locker    Independent Ambulation at Baseline: Yes/No: Yes    Participates in Care/Conversation: Yes/No: Yes    Patient Informed about EMPATH: Yes/No: Yes    DEC: Ordered and pending    Patient Ready to be Transferred to EMPATH? Yes/No: Yes

## 2024-09-25 PROCEDURE — 99223 1ST HOSP IP/OBS HIGH 75: CPT | Performed by: PSYCHIATRY & NEUROLOGY

## 2024-09-25 PROCEDURE — G0378 HOSPITAL OBSERVATION PER HR: HCPCS

## 2024-09-25 PROCEDURE — 250N000013 HC RX MED GY IP 250 OP 250 PS 637: Performed by: NURSE PRACTITIONER

## 2024-09-25 PROCEDURE — 250N000013 HC RX MED GY IP 250 OP 250 PS 637: Performed by: PSYCHIATRY & NEUROLOGY

## 2024-09-25 PROCEDURE — 250N000013 HC RX MED GY IP 250 OP 250 PS 637

## 2024-09-25 RX ORDER — OLANZAPINE 5 MG/1
5 TABLET, ORALLY DISINTEGRATING ORAL 2 TIMES DAILY PRN
Status: DISCONTINUED | OUTPATIENT
Start: 2024-09-25 | End: 2024-09-26 | Stop reason: HOSPADM

## 2024-09-25 RX ORDER — PRAZOSIN HYDROCHLORIDE 2 MG/1
4 CAPSULE ORAL AT BEDTIME
Status: DISCONTINUED | OUTPATIENT
Start: 2024-09-25 | End: 2024-09-26 | Stop reason: HOSPADM

## 2024-09-25 RX ADMIN — OLANZAPINE 5 MG: 5 TABLET, ORALLY DISINTEGRATING ORAL at 19:30

## 2024-09-25 RX ADMIN — ARIPIPRAZOLE 10 MG: 5 TABLET ORAL at 21:00

## 2024-09-25 RX ADMIN — PRAZOSIN HYDROCHLORIDE 4 MG: 2 CAPSULE ORAL at 21:00

## 2024-09-25 RX ADMIN — FLUOXETINE HYDROCHLORIDE 60 MG: 20 CAPSULE ORAL at 19:30

## 2024-09-25 RX ADMIN — HYDROXYZINE HYDROCHLORIDE 25 MG: 25 TABLET, FILM COATED ORAL at 18:12

## 2024-09-25 ASSESSMENT — ACTIVITIES OF DAILY LIVING (ADL)
ADLS_ACUITY_SCORE: 37

## 2024-09-25 ASSESSMENT — COLUMBIA-SUICIDE SEVERITY RATING SCALE - C-SSRS
TOTAL  NUMBER OF INTERRUPTED ATTEMPTS SINCE LAST CONTACT: NO
1. SINCE LAST CONTACT, HAVE YOU WISHED YOU WERE DEAD OR WISHED YOU COULD GO TO SLEEP AND NOT WAKE UP?: YES
1. SINCE LAST CONTACT, HAVE YOU WISHED YOU WERE DEAD OR WISHED YOU COULD GO TO SLEEP AND NOT WAKE UP?: CHRONIC PASSIVE THOUGHTS
6. HAVE YOU EVER DONE ANYTHING, STARTED TO DO ANYTHING, OR PREPARED TO DO ANYTHING TO END YOUR LIFE?: NO
SUICIDE, SINCE LAST CONTACT: NO
2. HAVE YOU ACTUALLY HAD ANY THOUGHTS OF KILLING YOURSELF?: NO
TOTAL  NUMBER OF ABORTED OR SELF INTERRUPTED ATTEMPTS SINCE LAST CONTACT: NO
ATTEMPT SINCE LAST CONTACT: NO
REASONS FOR IDEATION SINCE LAST CONTACT: COMPLETELY TO END OR STOP THE PAIN (YOU COULDN'T GO ON LIVING WITH THE PAIN OR HOW YOU WERE FEELING)

## 2024-09-25 NOTE — PROGRESS NOTES
Pt appears to be sleeping in sensory room D. Eyes closed, covered with blanket. Chest rise and fall noted. Snoring lightly. No signs of distress.

## 2024-09-25 NOTE — ED NOTES
Patient report he continues to have suicidal ideation without any plan. He describes the thoughts as intrusive thoughts. He denies any auditory hallucinations.He also endorses having anxiety which he took 25 mg of hydroxyzine for without any relief. He is currently trying to distract himself by listening to music. Patient has been visible on the unit throughout the evening. He verbally contracts for safety.  Nursing will continue to monitor for safety.

## 2024-09-25 NOTE — ED NOTES
29 year old male with history of autism/adhd received from ED due to suicidal ideation. Reports that he recently moved into a new group home and has been having more SI. Pt reports that he thinks his finances and his questioning his sexuality has led to more SI. Denies SI while on the unit, denies HI. Nursing and risk assessments completed. Assessments reviewed with [facility]HP and physician. Admission information reviewed with patient. Patient given a tour of [facility] and instructions on using the facility. Questions regarding [facility] addressed. Pt safety search completed.  Patient informed video monitoring is in progress

## 2024-09-25 NOTE — PROGRESS NOTES
"Pt has been in sensory room D this morning. He did come out to milieu to eat breakfast at table. RN checked in with pt at that time, VSS. States he slept well overnight but still feels very tired. He reports ongoing SI and depressed mood. He doesn't feel safe leaving hospital and worries he might \"just act without thinking\". Pt is cooperative with cares. Denies any current needs or concerns. He may request to shower later but wants to nap at this time. Returned to sensory room D to nap.   "

## 2024-09-25 NOTE — CONSULTS
"Diagnostic Evaluation Consultation  Crisis Assessment    Patient Name: Pj Tucker  Age:  29 year old  Legal Sex: male  Gender Identity: male  Pronouns:   Race: White  Ethnicity: Not  or   Language: English      Patient was assessed: In person   Crisis Assessment Start Date: 09/24/24  Crisis Assessment Start Time: 1806  Crisis Assessment Stop Time: 1836  Patient location: [facility] EMERGENCY DEPT                             [facility]    Referral Data and Chief Complaint  Pj Tucker presents to the ED by  self. Patient is presenting to the ED for the following concerns: Suicidal ideation.   Factors that make the mental health crisis life threatening or complex are:  Pt endorses increased active SI with plan to jump off a bridge due to stressors of financial burden/debt and questioning his sexuality. Patient has not seen any improvement in sx while utilizing his outpatient supports over the last 3 days.      Informed Consent and Assessment Methods  Explained the crisis assessment process, including applicable information disclosures and limits to confidentiality, assessed understanding of the process, and obtained consent to proceed with the assessment.  Assessment methods included conducting a formal interview with patient, review of medical records, collaboration with medical staff, and obtaining relevant collateral information from family and community providers when available.  : done     Patient response to interventions: acceptance expressed, verbalizes understanding  Coping skills were attempted to reduce the crisis:  Drove himself to the ED, told others at his  about the SI     History of the Crisis    Pj presented to the ED via driving himself for increased suicide ideation with a method of jumping off a bridge. He reports financial stressors of not being able to pay for bills, \"the stress of being an adult\", and questioning his sexuality as reasons for increase in SI. " "Patient reports sx emerged approximately 3 days ago, which aligns with  report in noticing changing sx. Patient reports medication compliance. Pt endorses in the last 3 days his appetite has been poor and he has noticed he is sleeping more than usual. Pt has one prior aborted suicide attempt in April of jumping off a bridge. He reports he is not sure why he did not follow through with this plan, but was brought to a hospital and went IP for over 2 weeks. He thought this was helpful, no other hx of IP. Patient reports today, his plan/method is to jump off a bridge and endorses intent is \"unsure\".  Pt moved to his current group home 1.5 months ago, prior to this he lived with a friend. He reports he likes the . He has been in a relationship for a few months. Pt does not endorse HI/AH/VH/SIB.     Brief Psychosocial History  Family:   (Has a boyfriend), Children no  Support System:  Significant Other, Facility resident(s)/Staff  Employment Status:  employed full-time  Source of Income:  salary/wages  Financial Environmental Concerns:  other (see comments) (Financial struggle, difficult to keep up with bills)  Current Hobbies:   (disc golf)  Barriers in Personal Life:  financial concerns, mental health concerns    Significant Clinical History  Current Anxiety Symptoms:     Current Depression/Trauma:  difficulty concentrating, withdrawl/isolation, thoughts of death/suicide  Current Somatic Symptoms:     Current Psychosis/Thought Disturbance:     Current Eating Symptoms:  loss of appetite  Chemical Use History:  Alcohol: Social  Last Use:: 09/21/24  Benzodiazepines: None  Opiates: None  Cocaine: None  Marijuana: None  Other Use: None   Past diagnosis:  ADHD, Autism  Family history:  ADHD, Autism  Past treatment:  Individual therapy, Case management, Primary Care, Inpatient Hospitalization, Psychiatric Medication Management, Supportive Living Environment (group home, prison house, etc)  Details of most recent " treatment:   Pt lives in a group home [facility]. Patient sees Mindy for therapy at [facility]. He has a  named Elbert,   Other relevant history:          Collateral Information  Is there collateral information: Yes     Collateral information name, relationship, phone number:  OMARI Tinsley Manager, [phone]    What happened today: Last couple weeks he says he is going back to suicidal thoughts. Today he said the same thing as last night, he talked to his  Elbert and he recommended pt come to [facility]. He is medication compliant. We sat with him talking and being with him. He is very quiet and he won't tell anyone. This is recent.  Usually he is talkative and interacts with other residents. He works full time. I noticed his change in mood 3-4 days ago where he won't talk.  He has lived here 1.5 months.     What is different about patient's functioning: Not talking, suicidal     Concern about alcohol/drug use:  No    What do you think the patient needs:  Unsure but we are not helpful to him    Has patient made comments about wanting to kill themselves/others: yes    If d/c is recommended, can they take part in safety/aftercare planning:yes       Risk Assessment  [place] Suicide Severity Rating Scale Full Clinical Version:  Suicidal Ideation  Q6 Suicide Behavior (Lifetime): yes          [place] Suicide Severity Rating Scale Recent:   Suicidal Ideation (Recent)  Q1 Wished to be Dead (Past Month): yes  Q2 Suicidal Thoughts (Past Month): yes  Q3 Suicidal Thought Method: yes  Q4 Suicidal Intent without Specific Plan: yes  Q5 Suicide Intent with Specific Plan: yes  If yes to Q6, within past 3 months?: no  Level of Risk per Screen: high risk          Environmental or Psychosocial Events: excessive debt, poor finances, social isolation, other life stressors (questioning his sexuality)  Protective Factors: Protective Factors: strong bond to family unit, community support, or  employment, constructive use of leisure time, enjoyable activities, resilience, supportive ongoing medical and mental health care relationships, help seeking, good impulse control, good treatment engagement    Does the patient have thoughts of harming others? Feels Like Hurting Others: no  Previous Attempt to Hurt Others: no  Is the patient engaging in sexually inappropriate behavior?: no    Is the patient engaging in sexually inappropriate behavior?  no        Mental Status Exam   Affect: Flat  Appearance: Appropriate  Attention Span/Concentration: Attentive  Eye Contact: Engaged    Fund of Knowledge: Appropriate   Language /Speech Content: Fluent  Language /Speech Volume: Normal  Language /Speech Rate/Productions: Normal  Recent Memory: Intact  Remote Memory: Intact  Mood: Depressed, Sad  Orientation to Person: Yes   Orientation to Place: Yes  Orientation to Time of Day: Yes  Orientation to Date: Yes     Situation (Do they understand why they are here?): Yes  Psychomotor Behavior: Normal  Thought Content: Suicidal  Thought Form: Intact        Medication  Psychotropic medications:   Medication Orders - Psychiatric (From admission, onward)      None             Current Care Team  Patient Care Team:  Clinic, [facility] Marie/Rsvl as PCP - General (Certified Registered)    Diagnosis  Patient Active Problem List   Diagnosis Code    Major depressive disorder, recurrent episode, moderate (H) F33.1    Suicidal ideation R45.851    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    Active autistic disorder F84.0    Obesity due to excess calories, unspecified obesity severity E66.09    Idiopathic scoliosis and kyphoscoliosis M41.20    Encephalopathy, unspecified G93.40    Attention deficit disorder F98.8       Primary Problem This Admission  Active Hospital Problems    Active autistic disorder      Major depressive disorder, recurrent episode, moderate (H)      *Attention deficit disorder        Clinical Summary and Substantiation of  Recommendations   A lower level of care has been unsuccessful in treating and stabilizing patient s mental health symptoms because of increased active SI with plan to jump off a bridge due to stressors of financial burden/debt and questioning his sexuality. Patient has not seen any improvement in sx while utilizing his outpatient supports over the last 3 days. However, with brief observation, monitored therapeutic treatment, and intervention of mental health symptoms in [facility], symptoms may be mitigated with potential for disposition to a less restrictive level of care than an inpatient setting. Patient is not currently on the inpatient worklist. Next steps in care include psych consult and reassessment. Extended Care will follow, working to address disposition.     Patient coping skills attempted to reduce the crisis:  Drove himself to the ED, told others at his  about the SI    Disposition  Recommended disposition: Observation        Reviewed case and recommendations with attending provider. Attending Name: Dr. Garcia       Attending concurs with disposition: yes       Patient and/or validated legal guardian concurs with disposition:   yes       Final disposition:  observation    Legal status on admission: Voluntary/Patient has signed consent for treatment    Assessment Details   Total duration spent with the patient: 30 min     CPT code(s) utilized: 16838 - Psychotherapy for Crisis - 60 (30-74*) min    Flash Barbosa Psychotherapist  [facility] - Triage & Transition Services  Callback: [phone]

## 2024-09-25 NOTE — PROGRESS NOTES
"Triage and Transition Services Extended Care Reassessment     Patient: Pj goes by \"Pj,\" uses he/him pronouns  Date of Service: September 25, 2024  Site of Service: [facility] EMERGENCY DEPT                             [id number]    Patient was seen yes  Mode of Assessment: In person     Reason for Reassessment: depression, suicidal ideation, worsening psychosocial stress    History of Patient's Original Emergency Room Encounter: dx hx of ASD, ADHD, chronic SI    Current Patient Presentation: calm, cooperative, rested    Presentation Summary: Patient has been napping most of the shift in sensory room D (SRD) so writer engaged with patient when seen getting snacks from the counter.  Patient accepted writer's introduction at snack bar and agreed to meet in SRD.   He reports not feeling any better this afternoon and admits that his suicidal thoughts are at baseline.  He engages with very concrete logic and statements.  Patient is not sure what else he will need from staff from a therapeutic standpoint and had medication change recommendations from the provider this morning.  Patient understands to reach to staff if he is experiencing changes in mental status or functioning.  Patient was reminded that [facility] is a short-term stabilization area so tomorrow will be reassessing for ability to return to group home.    Changes Observed Since Initial Assessment: patient/family request    Therapeutic Interventions Provided: Engaged in safety planning, Engaged in guided discovery, explored patient's perspectives and helped expand them through socratic dialogue., Coached on coping techniques/relaxation skills to help improve distress tolerance and managing intense emotions., Taught the link between thoughts, feelings, and behaviors., Reviewed healthy living that supports positive mental health, including looking at sleep hygiene, regular movement, nutrition, and regular socialization., Identified and practiced " coping skills.    Current Symptoms:  (denies anxiety) helplessness, withdrawal/isolation  (none observed) impulsive  (no changes or concerns)    Mental Status Exam   Affect: Appropriate  Appearance: Appropriate  Attention Span/Concentration: Attentive  Eye Contact: Engaged    Fund of Knowledge: Appropriate   Language /Speech Content: Fluent  Language /Speech Volume: Normal  Language /Speech Rate/Productions: Normal  Recent Memory: Intact  Remote Memory: Intact  Mood: Normal  Orientation to Person: Yes   Orientation to Place: Yes  Orientation to Time of Day: Yes  Orientation to Date: Yes     Situation (Do they understand why they are here?): Yes  Psychomotor Behavior: Normal  Thought Content: Clear  Thought Form: Intact    Treatment Objective(s) Addressed: rapport building, orienting the patient to therapy, identifying an appropriate aftercare plan, assessing safety, identifying additional supports    Patient Response to Interventions: eager to participate, acceptance expressed, verbalizes understanding    Progress Towards Goals:  Patient Reports Symptoms Are: stable  Patient Progress Toward Goals: is making progress  Comment: engaged, pleasant  Next Step to Work Toward Discharge: symptom stabilization  Symptom Stabilization Comment: assess how medication changes were effective and anticipate return to     Case Management: Summary of Interaction: none; RN spoke with     JO-ANN Since Last Contact:   1. Wish to be Dead (Since Last Contact): Yes  Wish to be Dead Description (Since Last Contact): chronic passive thoughts  2. Non-Specific Active Suicidal Thoughts (Since Last Contact): No  Most Severe Ideation Rating (Since Last Contact): 1  Frequency (Since Last Contact): Daily or almost daily  Duration (Since Last Contact): Fleeting, few seconds or minutes  Controllability (Since Last Contact): Can control thoughts with little difficulty  Deterrents (Since Last Contact): Deterrents definitely stopped you  from attempting suicide  Reasons for Ideation (Since Last Contact): Completely to end or stop the pain (You couldn't go on living with the pain or how you were feeling)  Actual Attempt (Since Last Contact): No  Has subject engaged in non-suicidal self-injurious behavior? (Since Last Contact): No  Interrupted Attempts (Since Last Contact): No  Aborted or Self-Interrupted Attempt (Since Last Contact): No  Preparatory Acts or Behavior (Since Last Contact): No  Suicide (Since Last Contact): No     Calculated C-SSRS Risk Score (Since Last Contact): Low Risk    Plan: Final Disposition / Recommended Care Path: observation  Plan for Care reviewed with assigned Medical Provider: yes  Plan for Care Team Review: provider, RN  Comments: Brenda  Patient and/or validated legal guardian concurs: yes  Clinical Substantiation: Patient will remain in observation as there were medication change recommendations.  During reassessment, patient presents with baseline chronic suicidal ideations that are exacerbated by situational and environmental stressors.  Patient reports feeling no different than when he first presented yesterday.  He has been observed to be sleeping most of the shift in sensory room D.  Patient does briefly engage in therapeutic check in however cognition level does not allow for much in-depth therapeutic work.    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

## 2024-09-25 NOTE — PLAN OF CARE
Pj MEHDI Mendozar  September 24, 2024  Plan of Care Hand-off Note     Patient Care Path: observation    Plan for Care:   A lower level of care has been unsuccessful in treating and stabilizing patient s mental health symptoms because of increased active SI with plan to jump off a bridge due to stressors of financial burden/debt and questioning his sexuality. Patient has not seen any improvement in sx while utilizing his outpatient supports over the last 3 days. However, with brief observation, monitored therapeutic treatment, and intervention of mental health symptoms in [facility], symptoms may be mitigated with potential for disposition to a less restrictive level of care than an inpatient setting. Patient is not currently on the inpatient worklist. Next steps in care include psych consult and reassessment. Extended Care will follow, working to address disposition.     Identified Goals and Safety Issues:    - Safety planning  - Psych recommendations  - Observation of sx    Overview:       Lives at [facility] [phone]   Cara Tucker, Mother, [phone]        Legal Status: Legal Status at Admission: Voluntary/Patient has signed consent for treatment    Psychiatry Consult: To be seen by [facility] provider in AM       Updated [facility] RN regarding plan of care.           Flash Barbosa

## 2024-09-25 NOTE — ED PROVIDER NOTES
[facility] Unit - Initial Psychiatric Observation Note  [facility] Emergency Department  Observation Initiation Date: Sep 24, 2024    Pj Tucker MRN: [id number]   Age: 29 year old YOB: 1995     History     Chief Complaint   Patient presents with    Suicidal     HPI  Pj Tucker is a 29 year old male with a past history notable for ADHD and autism spectrum, MDD presenting with SI, plan to jump off bridge. No attempt to go bridge yesterday but presented to [facility] PES Sunday, spent night, no med changes, went home with no improvement. Saw therapist yesterday, not improved, felt worsening depressed mood and hopelessness and drove himself to [facility].  Placed on Health officer hold but today is voluntary when seen, requesting to stay.     Pt reports no acute stressors- likes job, no interpersonal relationship issues, likes [place] although some minor increased financial stress since moving there 1.5 months ago.  Has BF, identifies as Bi but cites stress given ambiguity of attraction to both sexes, find this confusing/overwhelming.  These stressors all chronic for years, though, however when had similar presentation in spring, had nearly 2 week vol stay on IP psych to stabilize.      Slept well overnight, no agitation, still with some SI this am.     Endorses nightmares x 3 days a week, poor energy, amotivation, some increasing hopelessness, worthlessness and at times feels he would be better off dead. No anhedonia, finds thoughts of suicide to be intrusive, started 1 week ago. Did note he is taking meds, not using any substances, did have full resolution of depression over the summer with current meds, no recent changes. Cannot identify trigger for worsening mood.     Past Medical History  No past medical history on file.  No past surgical history on file.  ARIPiprazole (ABILIFY) 10 MG tablet  FLUoxetine (PROZAC) 40 MG capsule  hydrOXYzine HCl (ATARAX) 25 MG tablet  metFORMIN (GLUCOPHAGE XR) 500 MG 24 hr  "tablet  prazosin (MINIPRESS) 1 MG capsule      No Known Allergies  Family History  No family history on file.  Social History       Past medical history, past surgical history, medications, allergies, family history, and social history were reviewed with the patient. No additional pertinent items.       Review of Systems  A medically appropriate review of systems was performed with pertinent positives and negatives noted in the HPI, and all other systems negative.    Physical Examination   BP: (!) 136/94  Pulse: 111  Temp: 98.6  F (37  C)  Resp: 18  Height: 170.2 cm (5' 7\")  Weight: 117.5 kg (259 lb 1.6 oz)  SpO2: 96 %    Physical Exam  General: Appears stated age.   Neuro: Alert and fully oriented. Extremities appear to demonstrate normal strength on visual inspection.   Integumentary/Skin: no rash visualized, normal color    Psychiatric Examination   Appearance: awake, alert, dressed in hospital scrubs, and appeared as age stated  Attitude:  cooperative  Eye Contact:  good  Mood:  sad   Affect:  intensity is blunted  Speech:  decreased prosody  Psychomotor Behavior:  no evidence of tardive dyskinesia, dystonia, or tics  Thought Process:  logical and linear, concrete   Associations:  no loose associations  Thought Content:  active suicidal ideation present but no plan to harm self on unit   Insight:  fair  Judgement:  fair  Oriented to:  time, person, and place  Attention Span and Concentration:  intact  Recent and Remote Memory:  intact  Language: able to name/identify objects without impairment  Fund of Knowledge: intact with awareness of current and past events    ED Course        Labs Ordered and Resulted from Time of ED Arrival to Time of ED Departure   GLUCOSE BY METER - Normal       Result Value    GLUCOSE BY METER POCT 92     GLUCOSE MONITOR NURSING POCT       Assessments & Plan (with Medical Decision Making)   Patient presenting with acute SI with plan to jump off bridge in setting of austism, ADHD, " worsening MDD, recurrent, moderate with no clear acute trigger. Is voluntarily at this time.  Nursing notes reviewed noting no acute issues.     I have reviewed the assessment completed by the [facility].     During the observation period, the patient did not require medications for agitation, and did not require restraints/seclusion for patient and/or provider safety.     The patient was found to have a psychiatric condition that would benefit from an observation stay in the emergency department for further psychiatric stabilization and/or coordination of a safe disposition. The observation plan includes serial assessments of psychiatric condition, potential administration of medications if indicated, further disposition pending the patient's psychiatric course during the monitoring period.     Preliminary diagnosis:    ICD-10-CM    1. Suicidal ideation  R45.851        2. MDD, moderate, recurrent  3. ADHD by report  4. Autism     Treatment Plan:  -increase fluoxextine to 60mg daily, change to AM dosing in case worsening sleep (never taken in AM)  -increase prazosin to 4mg daily from 3mg for nightmares  -continue abilify 10mg daily  -good candidate for IOP or PHP, willing but concerned about work  -Has OP prescriber and therapist already, ensure upcoming appts   -reviewed TSH, lipid panel, UDS pending  -reviewed [facility] notes from last P[facility] stay    Lurdes Gutierrez MD   [facility] EMERGENCY DEPT  [facility] Unit

## 2024-09-26 VITALS
HEIGHT: 67 IN | RESPIRATION RATE: 18 BRPM | SYSTOLIC BLOOD PRESSURE: 159 MMHG | TEMPERATURE: 98.5 F | HEART RATE: 105 BPM | BODY MASS INDEX: 40.67 KG/M2 | WEIGHT: 259.1 LBS | OXYGEN SATURATION: 95 % | DIASTOLIC BLOOD PRESSURE: 91 MMHG

## 2024-09-26 PROCEDURE — 80307 DRUG TEST PRSMV CHEM ANLYZR: CPT | Performed by: EMERGENCY MEDICINE

## 2024-09-26 PROCEDURE — 99239 HOSP IP/OBS DSCHRG MGMT >30: CPT | Performed by: PSYCHIATRY & NEUROLOGY

## 2024-09-26 PROCEDURE — G0378 HOSPITAL OBSERVATION PER HR: HCPCS

## 2024-09-26 PROCEDURE — 250N000013 HC RX MED GY IP 250 OP 250 PS 637: Performed by: NURSE PRACTITIONER

## 2024-09-26 RX ORDER — PRAZOSIN HYDROCHLORIDE 2 MG/1
4 CAPSULE ORAL AT BEDTIME
Qty: 60 CAPSULE | Refills: 0 | Status: SHIPPED | OUTPATIENT
Start: 2024-09-26 | End: 2024-10-26

## 2024-09-26 RX ADMIN — HYDROXYZINE HYDROCHLORIDE 25 MG: 25 TABLET, FILM COATED ORAL at 12:50

## 2024-09-26 ASSESSMENT — ACTIVITIES OF DAILY LIVING (ADL)
ADLS_ACUITY_SCORE: 37

## 2024-09-26 ASSESSMENT — COLUMBIA-SUICIDE SEVERITY RATING SCALE - C-SSRS
REASONS FOR IDEATION SINCE LAST CONTACT: COMPLETELY TO END OR STOP THE PAIN (YOU COULDN'T GO ON LIVING WITH THE PAIN OR HOW YOU WERE FEELING)
6. HAVE YOU EVER DONE ANYTHING, STARTED TO DO ANYTHING, OR PREPARED TO DO ANYTHING TO END YOUR LIFE?: NO
SUICIDE, SINCE LAST CONTACT: NO
TOTAL  NUMBER OF INTERRUPTED ATTEMPTS SINCE LAST CONTACT: NO
1. SINCE LAST CONTACT, HAVE YOU WISHED YOU WERE DEAD OR WISHED YOU COULD GO TO SLEEP AND NOT WAKE UP?: YES
ATTEMPT SINCE LAST CONTACT: NO
2. HAVE YOU ACTUALLY HAD ANY THOUGHTS OF KILLING YOURSELF?: NO
TOTAL  NUMBER OF ABORTED OR SELF INTERRUPTED ATTEMPTS SINCE LAST CONTACT: NO

## 2024-09-26 NOTE — PROGRESS NOTES
Discharge instructions reviewed with patient including follow-up care plan. Educated on medication regimen and advised not to stop prescribed medication without consulting their physician. Reviewed safety plan and outpatient resources. Pt denies active SI. Pt has contracted for safety and completed safety plan with [facility].  All belongings which were brought into the hospital have been returned to patient. Escorted off the unit at 2:11 pm.  Discharged to parent's home in stable condition via taxi.

## 2024-09-26 NOTE — PROGRESS NOTES
Triage & Transition Services, Extended Care     Client Name: Pj Tucker    Date: September 26, 2024    Patient was seen yes  Mode of Assessment: In person    Service Type: (P) refused to attend group session  Session Start Time:  (P) 1130    Session End Time: (P) 1150  Session Length: (P) 20  Site Location: [facility] EMERGENCY DEPT                             [id number]  Total Number ofAttendees: (P) 1  Topic:   (P) coping skills/lifestyle management   Response: (P) other (see comments) (pt declined to participate)     Elizabeth Cooper [facility]   Licensed Mental Health Professional (LMHP), Extended Care  [phone]

## 2024-09-26 NOTE — ED PROVIDER NOTES
"EmPATH Unit - Psychiatric Observation Discharge Summary  [facility] Emergency Department  Discharge Date: 9/26/2024    Pj Tucker MRN: [id number]   Age: 29 year old YOB: 1995     Brief HPI & Initial ED Course     Chief Complaint   Patient presents with    Suicidal     HPI  Pj Tucker is a 29 year old male with history notable for major depressive disorder, ADHD, and an autism spectrum disorder who is currently under observation status on the EmPATH unit, now approaching 47 hours in the emergency department.  Overnight, there were no acute issues.  On reassessment today, the patient reports that their depressive symptoms have improved and he is no longer experiencing suicidal thoughts.  He continues to sleep well at night.  Appetite is normal.  Energy is better.  He denied suicidal and homicidal thoughts.  There was no indication of psychosis.  He is tolerating his medications without side effects.  He interprets readiness to discharge back to his group home.  He inquired regarding a referral to intensive outpatient programming.        Physical Examination   BP: (!) 159/91  Pulse: 105  Temp: 98.5  F (36.9  C)  Resp: 18  Height: 170.2 cm (5' 7\")  Weight: 117.5 kg (259 lb 1.6 oz)  SpO2: 95 %    Physical Exam  General: Appears stated age.   Neuro: Alert and fully oriented. Extremities appear to demonstrate normal strength on visual inspection.   Integumentary/Skin: no rash visualized, normal color    Psychiatric Examination   Appearance: awake, alert  Attitude:  cooperative  Eye Contact:  fair  Mood:  better  Affect:  appropriate and in normal range  Speech:  clear, coherent  Psychomotor Behavior:  no evidence of tardive dyskinesia, dystonia, or tics  Thought Process:  logical  Associations:  no loose associations  Thought Content:  no evidence of suicidal ideation or homicidal ideation and no evidence of psychotic thought  Insight:  fair  Judgement:  fair  Oriented to:  time, person, and " place  Attention Span and Concentration:  fair  Recent and Remote Memory:  fair  Language: able to name/identify objects without impairment  Fund of Knowledge: intact with awareness of current and past events    Results        Labs Ordered and Resulted from Time of ED Arrival to Time of ED Departure   GLUCOSE BY METER - Normal       Result Value    GLUCOSE BY METER POCT 92     URINE DRUG SCREEN PANEL - Normal    Amphetamines Urine Screen Negative      Barbituates Urine Screen Negative      Benzodiazepine Urine Screen Negative      Cannabinoids Urine Screen Negative      Cocaine Urine Screen Negative      Fentanyl Qual Urine Screen Negative      Opiates Urine Screen Negative      PCP Urine Screen Negative     GLUCOSE MONITOR NURSING POCT       Observation Course   The patient was found to have a psychiatric condition that would benefit from an observation stay in the emergency department for further psychiatric stabilization and/or coordination of a safe disposition. The plan upon observation admission included serial assessments of psychiatric condition, potential administration of medications if indicated, further disposition pending the patient's psychiatric course during the monitoring period.     Serial assessments of the patient's psychiatric condition were performed. Nursing notes were reviewed. During the observation period, the patient did not require medications for agitation, and did not require restraints/seclusion for patient and/or provider safety.     After a period of working with the treatment team on the EmPATH unit, the patient's mental state improved to allow a safe transition to outpatient care. After counseling on the diagnosis, work-up, and treatment plan, the patient was discharged. Close follow-up with a psychiatrist and/or therapist was recommended and community psychiatric resources were provided. Patient is to return to the ED if any urgent or potentially life-threatening concerns.      Discharge Diagnoses:   Final diagnoses:   Suicidal ideation   Attention deficit disorder, unspecified hyperactivity presence   Active autistic disorder   Major depressive disorder, recurrent episode, moderate (H)          Treatment Plan:  -Continue fluoxetine at the higher dose of 60 mg daily for antidepressant treatment.  -Continue prazosin at the higher dose of 4 mg daily to minimize nightmares  -Continue Abilify 10 mg daily for management of affective instability and augmentation of his antidepressant medication  -Referral for intensive outpatient programming  -Resume outpatient medication management appointments and psychotherapy  -Discharged back to his group home today.    At the time of discharge, the patient's acute suicide risk was determined to be low due to the following factors: Reduction in the intensity of mood/anxiety symptoms that preceded the admission, denial of suicidal thoughts, denies feeling helpless or helpless, not currently under the influence of alcohol or illicit substances, denies experiencing command hallucinations, no immediate access to firearms. The patient's acute risk could be higher if noncompliant with their treatment plan, medications, follow-up appointments or using illicit substances or alcohol. Protective factors include: social supports, stable housing    I spent more than 30 minutes on discharge day activities.    --  Kumar Quevedo MD  [facility] EMERGENCY DEPT  EmPATH Unit       Kumar Quevedo MD  09/26/24 6935

## 2024-09-26 NOTE — DISCHARGE INSTRUCTIONS
Navigation Hub - Scheduled Appointment  You have been scheduled a telephone appointment with the Mental Health and Addiction Services Navigation Hub. As a reminder, this is not an in-person appointment. A Navigator will contact you at your personal telephone number on Friday Sept 27 th, 2024 at 9:30 AM. You can expect a 15-30 minute appointment. You will discuss programming options and be assisted in next steps. If you have any further questions or concerns, please contact the Navigation Hub at [phone]. Note: You will not be charged for this telephone appointment.       Aftercare Plan  If I am feeling unsafe or I am in a crisis, I will:   Contact my established care providers   Call the National Suicide Prevention Lifeline: 988  Go to the nearest emergency room   Call 911     Continue with established providers for therapy, psychiatry, case management.     Additional Information  Today you were seen by a licensed mental health professional through Triage and Transition services, Behavioral Healthcare Providers ([facility])  for a crisis assessment in the Emergency Department at [facility].  It is recommended that you follow up with your established providers (psychiatrist, mental health therapist, and/or primary care doctor - as relevant) as soon as possible. Coordinators from [facility] will be calling you in the next 24-48 hours to ensure that you have the resources you need.  You can also contact [facility] coordinators directly at [phone]. You may have been scheduled for or offered an appointment with a mental health provider. [facility] maintains an extensive network of licensed behavioral health providers to connect patients with the services they need.  We do not charge providers a fee to participate in our referral network.  We match patients with providers based on a patient's specific needs, insurance coverage, and location.  Our first effort will be to refer you to a provider within your care system, and will  utilize providers outside your care system as needed.

## 2024-09-26 NOTE — PROGRESS NOTES
"Pt slept until 10:30 AM. He moved from sensory room D to table in milieu to eat breakfast. Reports feeling \"about the same\" today, continues to feel depressed and reports ongoing SI though less frequent than yesterday. Feels good about medication changes made yesterday.     Pt was given urine sample cup and will attempt to provide sample after breakfast.     Agreeable to plan for reassessment today.   "

## 2024-09-26 NOTE — PROGRESS NOTES
"Triage and Transition Services Extended Care Reassessment     Patient: Pj goes by \"Pj,\" uses he/him pronouns  Date of Service: September 26, 2024  Site of Service: [facility] EMERGENCY DEPT                             EMP09    Patient was seen yes  Mode of Assessment: In person     Reason for Reassessment:  (discharge)    History of Patient's Original Emergency Room Encounter: dx hx of ASD, ADHD, chronic SI    Current Patient Presentation: calm, cooperative, pleasant    Presentation Summary: Patient was awake earlier than yesterday today and reports feeling a little better than yesterday with reduced intensity of his chronic SI.  Patient does ask if he is able to return to the  today which writer supported this inquiry.  He agrees to continue with established outpatient providers and communicate with  staff when his chronic baseline SI is flaring up.  Patient is open to Programmatic Care referral yet would prefer it not interfere with his work schedule.  Patient has been observed to be in the milieu watching TV, playing cards, and occasionally interacting with peers.    Changes Observed Since Initial Assessment: decrease in presenting symptoms, patient/family request    Therapeutic Interventions Provided: Engaged in safety planning, Engaged in guided discovery, explored patient's perspectives and helped expand them through socratic dialogue., Coached on coping techniques/relaxation skills to help improve distress tolerance and managing intense emotions., Taught the link between thoughts, feelings, and behaviors., Reviewed healthy living that supports positive mental health, including looking at sleep hygiene, regular movement, nutrition, and regular socialization., Identified and practiced coping skills.    Current Symptoms:  (denied)  (less than yesterday)  (none noted) impulsive  (no changes)    Mental Status Exam   Affect: Appropriate  Appearance: Appropriate  Attention " Span/Concentration: Attentive  Eye Contact: Engaged    Fund of Knowledge: Appropriate   Language /Speech Content: Fluent  Language /Speech Volume: Normal  Language /Speech Rate/Productions: Normal  Recent Memory: Intact  Remote Memory: Intact  Mood: Normal  Orientation to Person: Yes   Orientation to Place: Yes  Orientation to Time of Day: Yes  Orientation to Date: Yes     Situation (Do they understand why they are here?): Yes  Psychomotor Behavior: Normal  Thought Content: Clear  Thought Form: Intact    Treatment Objective(s) Addressed: rapport building, orienting the patient to therapy, identifying an appropriate aftercare plan, assessing safety, identifying additional supports    Patient Response to Interventions: eager to participate, acceptance expressed, verbalizes understanding    Progress Towards Goals:  Patient Reports Symptoms Are: stable  Patient Progress Toward Goals: is making progress  Comment: engaged, pleasant  Next Step to Work Toward Discharge: symptom stabilization, follow up on referrals  Symptom Stabilization Comment: manage baseline chronic SI  Symptom Stabilization Comment: schedule call with Programmatic Care tomorrow    Case Management: Case Management Included: collaborating with patient's support system  Details on Collaborating with Patient's Support System: Elbert Pretty CM  Summary of Interaction: confirmed Program[facility] Since Last Contact:   1. Wish to be Dead (Since Last Contact): Yes (chronic passive at baseline)  Wish to be Dead Description (Since Last Contact): chronic passive thoughts  2. Non-Specific Active Suicidal Thoughts (Since Last Contact): No  Most Severe Ideation Rating (Since Last Contact): 1  Frequency (Since Last Contact): Daily or almost daily  Duration (Since Last Contact): Fleeting, few seconds or minutes  Controllability (Since Last Contact): Easily able to control thoughts  Deterrents (Since Last Contact): Deterrents  definitely stopped you from attempting suicide  Reasons for Ideation (Since Last Contact): Completely to end or stop the pain (You couldn't go on living with the pain or how you were feeling)  Actual Attempt (Since Last Contact): No  Has subject engaged in non-suicidal self-injurious behavior? (Since Last Contact): No  Interrupted Attempts (Since Last Contact): No  Aborted or Self-Interrupted Attempt (Since Last Contact): No  Preparatory Acts or Behavior (Since Last Contact): No  Suicide (Since Last Contact): No     Calculated C-SSRS Risk Score (Since Last Contact): Low Risk    Plan: Final Disposition / Recommended Care Path: discharge  Plan for Care reviewed with assigned Medical Provider: yes  Plan for Care Team Review: provider, RN  Comments: Andish  Patient and/or validated legal guardian concurs: yes  Clinical Substantiation: Patient will be discharged back to group home today after experiencing a slight reduction in intensity of negative thoughts and chronic baseline SI.  Patient is more social and in the milieu today than yesterday.  He readily meets with writer albeit briefly to plan for return to group home.  Patient is interested in Programmatic Care however does not want it to interfere with work schedule  Patient understands to speak with staff at the  when SI is worsening and utilizing coping skills prior to coming to the ED.  Patient's  and  were updated on discharge plan and he will be cabbed to parents per request.    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

## 2024-09-28 ENCOUNTER — HOSPITAL ENCOUNTER (OUTPATIENT)
Facility: CLINIC | Age: 29
Setting detail: OBSERVATION
Discharge: NURSING, REHAB OR LONG-TERM CARE | End: 2024-09-29
Attending: EMERGENCY MEDICINE | Admitting: PSYCHIATRY & NEUROLOGY
Payer: COMMERCIAL

## 2024-09-28 DIAGNOSIS — F33.1 MODERATE EPISODE OF RECURRENT MAJOR DEPRESSIVE DISORDER (H): ICD-10-CM

## 2024-09-28 DIAGNOSIS — F90.9 ATTENTION DEFICIT HYPERACTIVITY DISORDER (ADHD), UNSPECIFIED ADHD TYPE: ICD-10-CM

## 2024-09-28 DIAGNOSIS — F84.0 AUTISM SPECTRUM DISORDER: ICD-10-CM

## 2024-09-28 DIAGNOSIS — R45.851 SUICIDAL IDEATION: ICD-10-CM

## 2024-09-28 DIAGNOSIS — F33.1 MAJOR DEPRESSIVE DISORDER, RECURRENT EPISODE, MODERATE (H): ICD-10-CM

## 2024-09-28 PROCEDURE — 99222 1ST HOSP IP/OBS MODERATE 55: CPT

## 2024-09-28 PROCEDURE — G0378 HOSPITAL OBSERVATION PER HR: HCPCS

## 2024-09-28 PROCEDURE — 250N000013 HC RX MED GY IP 250 OP 250 PS 637

## 2024-09-28 PROCEDURE — 99285 EMERGENCY DEPT VISIT HI MDM: CPT

## 2024-09-28 RX ORDER — METFORMIN HCL 500 MG
500 TABLET, EXTENDED RELEASE 24 HR ORAL 2 TIMES DAILY WITH MEALS
Status: DISCONTINUED | OUTPATIENT
Start: 2024-09-28 | End: 2024-09-29 | Stop reason: HOSPADM

## 2024-09-28 RX ORDER — TRAZODONE HYDROCHLORIDE 50 MG/1
50 TABLET, FILM COATED ORAL
Status: DISCONTINUED | OUTPATIENT
Start: 2024-09-28 | End: 2024-09-29 | Stop reason: HOSPADM

## 2024-09-28 RX ORDER — LANOLIN ALCOHOL/MO/W.PET/CERES
3 CREAM (GRAM) TOPICAL
Status: DISCONTINUED | OUTPATIENT
Start: 2024-09-28 | End: 2024-09-29 | Stop reason: HOSPADM

## 2024-09-28 RX ORDER — ACETAMINOPHEN 325 MG/1
650 TABLET ORAL EVERY 4 HOURS PRN
Status: DISCONTINUED | OUTPATIENT
Start: 2024-09-28 | End: 2024-09-29 | Stop reason: HOSPADM

## 2024-09-28 RX ORDER — ARIPIPRAZOLE 5 MG/1
10 TABLET ORAL AT BEDTIME
Status: DISCONTINUED | OUTPATIENT
Start: 2024-09-28 | End: 2024-09-29 | Stop reason: HOSPADM

## 2024-09-28 RX ORDER — OLANZAPINE 5 MG/1
5-10 TABLET, ORALLY DISINTEGRATING ORAL 2 TIMES DAILY PRN
Status: DISCONTINUED | OUTPATIENT
Start: 2024-09-28 | End: 2024-09-29 | Stop reason: HOSPADM

## 2024-09-28 RX ORDER — HYDROXYZINE HYDROCHLORIDE 25 MG/1
25-50 TABLET, FILM COATED ORAL 2 TIMES DAILY PRN
Status: DISCONTINUED | OUTPATIENT
Start: 2024-09-28 | End: 2024-09-29 | Stop reason: HOSPADM

## 2024-09-28 RX ORDER — PRAZOSIN HYDROCHLORIDE 2 MG/1
4 CAPSULE ORAL AT BEDTIME
Status: DISCONTINUED | OUTPATIENT
Start: 2024-09-28 | End: 2024-09-29 | Stop reason: HOSPADM

## 2024-09-28 RX ADMIN — METFORMIN ER 500 MG 500 MG: 500 TABLET ORAL at 21:07

## 2024-09-28 RX ADMIN — PRAZOSIN HYDROCHLORIDE 4 MG: 2 CAPSULE ORAL at 21:08

## 2024-09-28 RX ADMIN — ARIPIPRAZOLE 10 MG: 5 TABLET ORAL at 21:08

## 2024-09-28 ASSESSMENT — ACTIVITIES OF DAILY LIVING (ADL)
ADLS_ACUITY_SCORE: 37

## 2024-09-28 NOTE — ED TRIAGE NOTES
Pt BIBA from group home due to suicidal ideation. Per chart review, brian crowe pt's 3rd ED visit this week for same complaint. Pt states that his medications were increased and he has felt suicidal since. Pt calm, cooperative, not on hold

## 2024-09-28 NOTE — ED PROVIDER NOTES
"  Emergency Department Note      History of Present Illness     Chief Complaint   Suicidal      HPI   Pj Tucker is a 29 year old male with a history of depression and autism, presenting to the emergency department for a mental health evaluation. The patient reports he recently had a chance in his medication however continues to experience increased thoughts of self harm and suicidal ideation. He reports other recent stressors include his finances and his sexuality. He denies any fever or abdominal pain. He reports that he lives at a group home and was brought in by ambulance. He denies any homicidal ideation or any history harming himself. He denies any drug use. He notes occasional alcohol use with his last use being approximately one week ago. He denies any history of diabetes mellitus.     Independent Historian   None    Review of External Notes   N/A    Past Medical History     Medical History and Problem List   Depression  Autism   Shprintzen syndrome   Encephalopathy  ADD  Suicidal ideation     Medications   Abilify   Prozac  Atarax   Minipress  Metformin     Surgical History   None     Physical Exam     Patient Vitals for the past 24 hrs:   BP Temp Temp src Pulse Resp SpO2 Height Weight   09/28/24 1800 (!) 149/105 98  F (36.7  C) Oral 86 18 98 % 1.727 m (5' 8\") 118.6 kg (261 lb 8 oz)   09/28/24 1628 (!) 167/117 97  F (36.1  C) Oral 96 18 97 % -- --     Physical Exam  General: Patient in mild distress.  Alert and cooperative with exam. Baseline mentation  HEENT: NC/AT. Conjunctiva without injection or scleral icterus. External ears normal.  Respiratory: Breathing comfortably on room air  CV: Normal rate, all extremities well perfused  GI:  Non-distended abdomen  Skin: Warm, dry, no rashes/open wounds on exposed skin  Musculoskeletal: No obvious deformities  Neuro: Alert, answers questions appropriately. No gross motor deficits  Psychiatric: Endorses SI, denies HI       Diagnostics     Lab Results   Labs " Ordered and Resulted from Time of ED Arrival to Time of ED Departure - No data to display    Imaging   No orders to display       EKG   None    Independent Interpretation   None    ED Course      Medications Administered   Medications   ARIPiprazole (ABILIFY) tablet 10 mg (10 mg Oral $Given 9/28/24 2108)   FLUoxetine (PROzac) capsule 40 mg (has no administration in time range)   hydrOXYzine HCl (ATARAX) tablet 25-50 mg (has no administration in time range)   metFORMIN (GLUCOPHAGE XR) 24 hr tablet 500 mg (500 mg Oral $Given 9/28/24 2107)   prazosin (MINIPRESS) capsule 4 mg (4 mg Oral $Given 9/28/24 2108)   acetaminophen (TYLENOL) tablet 650 mg (has no administration in time range)   melatonin tablet 3 mg (has no administration in time range)   traZODone (DESYREL) tablet 50 mg (has no administration in time range)   OLANZapine zydis (zyPREXA) ODT tab 5-10 mg (has no administration in time range)       Procedures   Procedures     Discussion of Management   None    ED Course   ED Course as of 09/28/24 2214   Sat Sep 28, 2024   1736 I obtained history and examined the patient as noted above.          Additional Documentation  None    Medical Decision Making / Diagnosis     CMS Diagnoses: None    MIPS       None    MDM   Pj Tucker is a 29 year old male who presents from group home with reported suicidal ideation.  Patient denied toxic ingestion, illicit drug use, intoxication, or intentional overdose.  Calm and cooperative on exam.  No indication for emergent labs or imaging.  No concerning findings on exam.  Patient agreeable to voluntary assessment in empath unit.  Medically clear for transfer to empath.    Disposition   [facility]     Diagnosis     ICD-10-CM    1. Suicidal ideation  R45.851       2. Attention deficit hyperactivity disorder (ADHD), unspecified ADHD type  F90.9       3. Autism spectrum disorder  F84.0       4. Moderate episode of recurrent major depressive disorder (H)   F33.1                  Scribe Disclosure:  I, Esteban Perez, am serving as a scribe at 7:04 PM on 9/28/2024 to document services personally performed by Archie Thurston DO based on my observations and the provider's statements to me.        Archie Thurston,   09/29/24 0120

## 2024-09-28 NOTE — ED NOTES
[facility]  ED to EMPATH Checklist:      Goal for EMPATH: Depression management    Current Behavior: Calm and Cooperative    Safety Concerns: Suicidal, no plan    Legal Hold Status: [facility]    Medically Cleared by ED provider: Yes    Patient Therapeutically Searched: Not searched - Currently in triage    Belongings: Remain with patient    Independent Ambulation at Baseline: Yes/No: Yes    Participates in Care/Conversation: Yes/No: Yes    Patient Informed about EMPATH: Yes/No: Yes    DEC: Ordered and pending    Patient Ready to be Transferred to EMPATH? Yes/No: Yes

## 2024-09-28 NOTE — PROGRESS NOTES
Pt comes in with increased SI. Pt attributes these symptoms of increased SI to his recent medication changes. Pt is hoping to figure out what is making him feel this way. He denies any HI. He denies AV/H.   Nursing and risk assessments completed. Assessments reviewed with LMHP and physician. Admission information reviewed with patient. Patient given a tour of EmPATH and instructions on using the facility. Questions regarding EmPATH addressed. Pt safety search completed.

## 2024-09-29 VITALS
WEIGHT: 261.5 LBS | BODY MASS INDEX: 39.63 KG/M2 | OXYGEN SATURATION: 98 % | HEIGHT: 68 IN | RESPIRATION RATE: 18 BRPM | DIASTOLIC BLOOD PRESSURE: 105 MMHG | HEART RATE: 86 BPM | TEMPERATURE: 98 F | SYSTOLIC BLOOD PRESSURE: 149 MMHG

## 2024-09-29 PROCEDURE — 250N000013 HC RX MED GY IP 250 OP 250 PS 637

## 2024-09-29 PROCEDURE — 99239 HOSP IP/OBS DSCHRG MGMT >30: CPT | Performed by: NURSE PRACTITIONER

## 2024-09-29 PROCEDURE — G0378 HOSPITAL OBSERVATION PER HR: HCPCS

## 2024-09-29 RX ADMIN — FLUOXETINE HYDROCHLORIDE 40 MG: 20 CAPSULE ORAL at 08:29

## 2024-09-29 RX ADMIN — METFORMIN ER 500 MG 500 MG: 500 TABLET ORAL at 08:29

## 2024-09-29 ASSESSMENT — ACTIVITIES OF DAILY LIVING (ADL)
ADLS_ACUITY_SCORE: 37

## 2024-09-29 ASSESSMENT — COLUMBIA-SUICIDE SEVERITY RATING SCALE - C-SSRS
6. HAVE YOU EVER DONE ANYTHING, STARTED TO DO ANYTHING, OR PREPARED TO DO ANYTHING TO END YOUR LIFE?: NO
2. HAVE YOU ACTUALLY HAD ANY THOUGHTS OF KILLING YOURSELF?: YES
5. HAVE YOU STARTED TO WORK OUT OR WORKED OUT THE DETAILS OF HOW TO KILL YOURSELF? DO YOU INTEND TO CARRY OUT THIS PLAN?: NO
ATTEMPT SINCE LAST CONTACT: NO
TOTAL  NUMBER OF ABORTED OR SELF INTERRUPTED ATTEMPTS SINCE LAST CONTACT: NO
SUICIDE, SINCE LAST CONTACT: NO
TOTAL  NUMBER OF INTERRUPTED ATTEMPTS SINCE LAST CONTACT: NO
1. SINCE LAST CONTACT, HAVE YOU WISHED YOU WERE DEAD OR WISHED YOU COULD GO TO SLEEP AND NOT WAKE UP?: YES

## 2024-09-29 NOTE — ED PROVIDER NOTES
"[facility] Unit - Initial Psychiatric Observation Note  [facility] Emergency Department  Observation Initiation Date: Sep 28, 2024    Pj Tucker MRN: [id number]   Age: 29 year old YOB: 1995     History     Chief Complaint   Patient presents with    Suicidal     HPI  Pj Tucker is a 29 year old male with a past history notable for major depressive disorder, ADHD, and an autism spectrum disorder who presented to the emergency department with worsening mood and suicidal ideation. In the emergency department, this patient was determined to be medically stable and transferred to the [facility] unit for psychiatric assessment.  Record review indicates patient was recently at [facility] 9/25-9/26 following a similar presentation. They have currently been in the emergency department for 3 hours.     Pj was agreeable to meet with me in a consult room. He tells me that his mood worsened following discharge from [facility] 2 days ago. He has continued to have suicidal thoughts. When asked about a specific plan, he tells me \"I know what I would do if I decided to.\" He tells me he would jump off a bridge, he denies having a specific bridge and denies intent today. He has been having these suicidal thoughts on and off for 3 years with minimal relief. He finds laying video games and listening to music helpful to distract however these were not helpful today. Denies HI. AH/VH, denies tato. Denies substance use. He has been having difficulty falling asleep but estimates that he averages at least 8 hours of sleep a night. He does report that upon return to his group home, fluoxetine dosing was moved back to bedtime and this has impacted his sleep. When unable to fall asleep he feels as though this worsens his suicidal thoughts. He asks to reduce his fluoxetine back to 40mg as he perceives recently increased dose to correlate with worsening mood and suicidal thoughts. Discussed consideration for transitioning to another " "selective serotonin reuptake inhibitor or SNRI given lack of perceived benefit from fluoxetine, patient is unsure about this as he did find fluoxetine initially helpful. He requests to lower elizabeth from 60mg back to 40mg and remain under observation at [facility]. Throughout assessment, patient had difficulty identifying symptoms and stressors contributing to current presentation and provided minimal responses to questions. Please see DEC assessment for additional information.       Past Medical History  No past medical history on file.  No past surgical history on file.  ARIPiprazole (ABILIFY) 10 MG tablet  FLUoxetine (PROZAC) 20 MG capsule  hydrOXYzine HCl (ATARAX) 25 MG tablet  metFORMIN (GLUCOPHAGE XR) 500 MG 24 hr tablet  prazosin (MINIPRESS) 2 MG capsule      No Known Allergies  Family History  No family history on file.  Social History           Review of Systems  A medically appropriate review of systems was performed with pertinent positives and negatives noted in the HPI, and all other systems negative.    Physical Examination   BP: (!) 167/117  Pulse: 96  Temp: 97  F (36.1  C)  Resp: 18  Height: 172.7 cm (5' 8\")  Weight: 118.6 kg (261 lb 8 oz)  SpO2: 97 %    Physical Exam  General: Appears stated age.   Neuro: Alert and fully oriented. Extremities appear to demonstrate normal strength on visual inspection.   Integumentary/Skin: no rash visualized, normal color    Psychiatric Examination   Appearance: awake, alert, adequately groomed, appeared as age stated, and casually dressed  Attitude:  cooperative  Eye Contact:  fair  Mood:   \"fine\"  Affect:  intensity is blunted  Speech:  clear, coherent and normal prosody  Psychomotor Behavior:  no evidence of tardive dyskinesia, dystonia, or tics and intact station, gait and muscle tone  Thought Process:   concrete   Associations:  no loose associations  Thought Content:  no evidence of psychotic thought, active suicidal ideation present, and plan for suicide " present  Insight:  fair  Judgement:  fair  Oriented to:  time, person, and place  Attention Span and Concentration:  fair  Recent and Remote Memory:  fair  Language: able to name/identify objects without impairment  Fund of Knowledge: intact with awareness of current and past events    ED Course     ED Course as of 09/28/24 1923   Sat Sep 28, 2024   1736 I obtained history and examined the patient as noted above.          Labs Ordered and Resulted from Time of ED Arrival to Time of ED Departure - No data to display    Assessments & Plan (with Medical Decision Making)   Patient presenting with worsening depressed mood and suicidal ideation including plan, denies intent. Presentation further impacted by additional dx of ADHD and Autism. Patient does report ongoing suicidal ideation for the past 3 years, unable to identify specific triggers. Patient does appear to be quite perseverative on suicidal thoughts which he describes as chronic, I do wonder if ASD could be playing a role. Patient is requesting to decrease fluoxetine back to previous perceived helpful dosing. Should depression and suicidality persist, consider transition to another selective serotonin reuptake inhibitor or SNRI, patient declines at time of assessment to do this. Nursing notes reviewed noting no acute issues.     I have reviewed the assessment completed by the [facility].     During the observation period, the patient did not require medications for agitation, and did not require restraints/seclusion for patient and/or provider safety.     The patient was found to have a psychiatric condition that would benefit from an observation stay in the emergency department for further psychiatric stabilization and/or coordination of a safe disposition. The observation plan includes serial assessments of psychiatric condition, potential administration of medications if indicated, further disposition pending the patient's psychiatric course during the monitoring  period.     Preliminary diagnosis:    ICD-10-CM    1. Suicidal ideation  R45.851       2. Attention deficit hyperactivity disorder (ADHD), unspecified ADHD type  F90.9       3. Autism spectrum disorder  F84.0       4. Moderate episode of recurrent major depressive disorder (H)  F33.1            Treatment Plan:  -Decrease fluoxetine from 60mg to 40mg targeting depression- schedule this for morning dosing as taking it at bedtime can contribute to disrupted sleep   -Continue Abilify 10mg daily for antidepressant augmentation and mood stability   -Continue prazosin 4mg at bedtime for nightmares/sleep  -EmPATH standing order medications available   -Medication education provided this visit including but not limited to: Rationale for medication, importance of medication adherence, medication interactions, common medication side effects, benefits of medications.  -Individual psychotherapy and outpatient psychiatric care recommended for additional support and ongoing development of nonpharmacologic coping skills and strategies.    -Anticipate resuming outpatient medication management appointments and therapy upon discharge  -Patient schedule for programmatic care intake on 9/30/24   [facility] reassessment tomrorow     --  YUDITH Castro CNP   [facility] EMERGENCY DEPT  EmPATH Unit       Marisel Colin APRN CNP  09/28/24 1955

## 2024-09-29 NOTE — PROGRESS NOTES
Pt has been sleeping in sensory room B with no interruptions > 6 hrs. No distress noted; breathing even and unlabored.
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
## 2024-09-29 NOTE — DISCHARGE INSTRUCTIONS
RECOMMENDATIONS:  Follow up with  and therapist  Share safety plan with group home staff      SAFETY PLAN   Step 1: Warning signs:  Warning Signs   Isolating   Financial stress   Hopeless   Helpless      Step 2: Internal coping strategies - Things I can do to take my mind off my problems without contacting another person:  Strategies   Listen to music   Video games   Social media   Exercise      Step 3: People and social settings that provide distraction:  Name Contact Information   parents    Smith       Linkedwith   Gym   Race track      Step 4: People whom I can ask for help during a crisis:  Name Contact Information   Parents    Group [facility] Staff    Smith       Step 5: Professionals or agencies I can contact during a crisis:  Clinician/Agency Name Phone Emergency Contact   Elbert ()     Mindy (therapist)        Suicide Prevention Lifeline Phone: Call or Text 249  Crisis Text Line: Text HOME to 216758     Step 6: Making the environment safer (plan for lethal means safety):  Putting kitchen knives away      Optional: What is most important to me and worth living for?:  My future, my goals, finding a partner, stability

## 2024-09-29 NOTE — PROGRESS NOTES
"Triage and Transition Services Extended Care Reassessment     Patient: Pj goes by \"Pj,\" uses he/him pronouns  Date of Service: September 29, 2024  Site of Service: [facility] EMERGENCY DEPT                             [id number]  Patient was seen yes  Mode of Assessment: In person     Reason for Reassessment: other (see comment) (safety planning)    History of Patient's Original Emergency Room Encounter: Pt presented in ED with depression, SI and worsening psychosocial stressors. Pt was discharged from [facility] on 9/26. Prozac was increased during that admission.    Current Patient Presentation: Pt was calm and cooperative while on [facility]. He engaged easily with LMHP. He reports still experiencing SI, but notes \"it is more manageable now\". He denies intent and states he does not want to die.    Presentation Summary: Calm. Cooperative. Engaged. Fully oriented. Denies SI/HI/SIB. No signs of psychosis. Requesting to discharge back to  today.    Changes Observed Since Initial Assessment: decrease in presenting symptoms    Therapeutic Interventions Provided: Engaged in safety planning, Provided positive reinforcement for progress towards goals, gains in knowledge, and application of skills previously taught., Worked on relapse prevention planning (review of stressors, early warning signs, written plan to respond to signs, and rehearse plan).    Current Symptoms:  (none noted) withdrawl/isolation, sadness, thoughts of death/suicide  (none noted)  (none noted)  (none noted)    Mental Status Exam   Affect: Appropriate, Flat  Appearance: Appropriate  Attention Span/Concentration: Attentive  Eye Contact: Engaged    Fund of Knowledge: Appropriate   Language /Speech Content: Fluent  Language /Speech Volume: Normal  Language /Speech Rate/Productions: Normal  Recent Memory: Intact  Remote Memory: Intact  Mood: Normal  Orientation to Person: Yes   Orientation to Place: Yes  Orientation to Time of Day: " Yes  Orientation to Date: Yes     Situation (Do they understand why they are here?): Yes  Psychomotor Behavior: Normal, Underactive  Thought Content: Suicidal (Pt reports routinely experiencing SI with thoughts of jumping off a bridge, denies intent or specific plan)  Thought Form: Intact    Treatment Objective(s) Addressed: rapport building, identifying and practicing coping strategies, safety planning, identifying an appropriate aftercare plan, assessing safety, identifying additional supports    Patient Response to Interventions: verbalizes understanding, acceptance expressed    Progress Towards Goals:  Patient Reports Symptoms Are: stable  Patient Progress Toward Goals: is making progress  Next Step to Work Toward Discharge: engaging in safety planning with collateral sources  Ability to Engage in Safety Plan: safety plan for     Case Management: Case Management Included: collaborating with patient's support system  Details on Collaborating with Patient's Support System:     C-SSRS Since Last Contact:   1. Wish to be Dead (Since Last Contact): Yes  2. Non-Specific Active Suicidal Thoughts (Since Last Contact): Yes  3. Active Suicidal Ideation with any Methods (Not Plan) Without Intent to Act (Since Last Contact): No  4. Active Suicidal Ideation with Some Intent to Act, Without Specific Plan (Since Last Contact): No  5. Active Suicidal Ideation with Specific Plan and Intent (Since Last Contact): No     Actual Attempt (Since Last Contact): No  Has subject engaged in non-suicidal self-injurious behavior? (Since Last Contact): No  Interrupted Attempts (Since Last Contact): No  Aborted or Self-Interrupted Attempt (Since Last Contact): No  Preparatory Acts or Behavior (Since Last Contact): No  Suicide (Since Last Contact): No     Calculated C-SSRS Risk Score (Since Last Contact): Low Risk    Plan: Final Disposition / Recommended Care Path: discharge  Plan for Care reviewed with assigned Medical Provider: yes  "(Rosette Oseguera)  Plan for Care Team Review: provider, RN  Patient and/or validated legal guardian concurs: yes  Clinical Substantiation: Pt presented in ED with ongoing depression, SI, and psychosocial stressors. This seems to be at or near baseline for him. He endorses some SI, but notes it is 'manageable\" today. Pt reports satisfaction with OP providers, social/support system and group home. He completed safety plan with [facility] which he will share with  staff. Pt denies active SI/HI/SIB. Pt would like to discharge today. He states that he feels safe to return to  and that he will work with staff there on safety plan.    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    Active autistic disorder F84.0    Obesity due to excess calories, unspecified obesity severity E66.09    Idiopathic scoliosis and kyphoscoliosis M41.20    Encephalopathy, unspecified G93.40    Attention deficit disorder F98.8    Autism spectrum disorder F84.0    Attention deficit hyperactivity disorder (ADHD), unspecified ADHD type F90.9    Moderate episode of recurrent major depressive disorder (H) F33.1       Primary Problem This Admission: Active Hospital Problems    Autism spectrum disorder      Attention deficit hyperactivity disorder (ADHD), unspecified ADHD type      Moderate episode of recurrent major depressive disorder (H)      Suicidal ideation        Key Stein, LICSW "   Licensed Mental Health Professional (LMHP), [facility]  [phone]

## 2024-09-29 NOTE — PROGRESS NOTES
Called  re: discharge planning    Katerine Rowe, group home nurse manager, [phone]     Pt will discharge after seeing [facility] provider.  He will need transportation to   Informed Katerine that Pt will have safety plan to share with staff

## 2024-09-29 NOTE — PLAN OF CARE
Pj Tucker  September 28, 2024  Plan of Care Hand-off Note     Patient Care Path: observation    Plan for Care:   Pt is recommended for observation at [facility] unit to support safety and sx stabilization. Pt presents to ED via EMS due to concerns from depression and suicidal ideation that are influenced by worsening psychosocial stress. Pt reports SI with plan to jump off a bridge; pt denies SI intent and urges. Pt reports urges for SIB but has not cut himself. Pt denies HI. Pt denies AVH and other psychotic sx. Pt reports his mood and suicidality have worsened since recent medication change (increase of prozac) at last [facility] visit on 9/24/24. OP supports have insufficiently mitigated sx severity and pt may benefit from observation at [facility] with therapeutic/medication management services to support sx stabilization and safety. Pt and provider are agreeable to observation care path. LMHP will reassess pt tomorrow to determine appropriate care path. Pt has intake call scheduled for [place] programmatic care on Monday.      Overview:  -Pt is has intake for [place] programmatic care on Monday     -Safety planning may benefit from  staff involvement so they can redirect pt to use coping skills to manage SI and depressive sx.  - nurse manager Katerine (#[phone]) was contacted during DEC for collateral. Katerine believes pt may benefit from IP  for medication management; however, at time of DEC assessment pt does not meet criteria for IP MH admission.      Legal Status: Legal Status at Admission: Voluntary/Patient has signed consent for treatment         Updated regarding plan of care.           Shayne Alexandra

## 2024-09-29 NOTE — ED NOTES
Pt this morning has been sleeping in sensory room B until woken up by this writer for medications, breakfast and VS. Pt was cooperative, denies any SI/HI at this time, client was medication compliant and since then been sleeping in his recliner.

## 2024-09-29 NOTE — ED PROVIDER NOTES
"[facility] Unit - Psychiatric Observation Discharge Summary  [facility] Emergency Department  Discharge Date: 9/29/2024    Pj Tucker MRN: [id number]   Age: 29 year old YOB: 1995     Brief HPI & Initial ED Course     Chief Complaint   Patient presents with    Suicidal     HPI  Pj Tucker is a 29 year old male with history notable for MDD, ADHD, ASD who presented from his group home to the emergency department with worsening mood and suicidal ideation. He was cleared medically and transferred to [facility] for additional assessment and treatment planning. At the time of the assessment today, 9/29/24 he is nearing 18 hours in emergency care.    Please see the [facility] assessment & reassessment (if available), ED physician notes and nursing notes for additional information and collateral content if available. All were reviewed prior to meeting with the patient. Pertinent content includes the following: He was seen by Marisel Colin CNP yesterday who noted the following:    Pj was agreeable to meet with me in a consult room. He tells me that his mood worsened following discharge from [facility] 2 days ago. He has continued to have suicidal thoughts. When asked about a specific plan, he tells me \"I know what I would do if I decided to.\" He tells me he would jump off a bridge, he denies having a specific bridge and denies intent today. He has been having these suicidal thoughts on and off for 3 years with minimal relief. He finds laying video games and listening to music helpful to distract however these were not helpful today. Denies HI. AH/VH, denies tato. Denies substance use. He has been having difficulty falling asleep but estimates that he averages at least 8 hours of sleep a night. He does report that upon return to his group home, fluoxetine dosing was moved back to bedtime and this has impacted his sleep. When unable to fall asleep he feels as though this worsens his suicidal thoughts. He asks to " reduce his fluoxetine back to 40mg as he perceives recently increased dose to correlate with worsening mood and suicidal thoughts. Discussed consideration for transitioning to another selective serotonin reuptake inhibitor or SNRI given lack of perceived benefit from fluoxetine, patient is unsure about this as he did find fluoxetine initially helpful. He requests to lower elizabeth from 60mg back to 40mg and remain under observation at [facility]. Throughout assessment, patient had difficulty identifying symptoms and stressors contributing to current presentation and provided minimal responses to questions. Please see DEC assessment for additional information.     Treatment Plan:  -Decrease fluoxetine from 60mg to 40mg targeting depression- schedule this for morning dosing as taking it at bedtime can contribute to disrupted sleep   -Continue Abilify 10mg daily for antidepressant augmentation and mood stability   -Continue prazosin 4mg at bedtime for nightmares/sleep  -[facility] standing order medications available   -Medication education provided this visit including but not limited to: Rationale for medication, importance of medication adherence, medication interactions, common medication side effects, benefits of medications.  -Individual psychotherapy and outpatient psychiatric care recommended for additional support and ongoing development of nonpharmacologic coping skills and strategies.    -Anticipate resuming outpatient medication management appointments and therapy upon discharge  -Patient schedule for programmatic care intake on 9/30/24   -Remain at [facility] under observation with reassessment tomrorow     On approach today, Pj is interviewed while he is seated in a conference room. He appears sleepy but is fully engaged throughout the interview. He denies active SI/HI, A/V hallucinations, delusions or paranoia. He would like to return to his group home today in time to watch the football game at noon. He was able to  "identify group home staff members he can confide in. He appreciates no untoward side effects from medication changes.    Physical Examination     BP (!) (P) 130/90   Pulse (P) 67   Temp (P) 98.2  F (36.8  C) (Oral)   Resp (P) 18   Ht 1.727 m (5' 8\")   Wt 118.6 kg (261 lb 8 oz)   SpO2 (P) 96%   BMI 39.76 kg/m      Physical Exam  General: Appears stated age.   Neuro: Alert and fully oriented. Extremities appear to demonstrate normal strength on visual inspection.   Integumentary/Skin: no rash visualized, normal color    Psychiatric Examination   Appearance: awake, alert  Attitude:  cooperative  Eye Contact:  good  Mood:  good  Affect:  appropriate and in normal range  Speech:  clear, coherent  Psychomotor Behavior:  no evidence of tardive dyskinesia, dystonia, or tics  Thought Process:  linear and goal oriented  Associations:  no loose associations  Thought Content:  no evidence of suicidal ideation or homicidal ideation and no evidence of psychotic thought  Insight:  fair  Judgement:  fair  Oriented to:  time, person, and place  Attention Span and Concentration:  intact  Recent and Remote Memory:  intact  Language: able to name/identify objects without impairment  Fund of Knowledge: intact with awareness of current and past events    Results     ED Course as of 09/29/24 1048   Sat Sep 28, 2024   8276 I obtained history and examined the patient as noted above.          Labs Ordered and Resulted from Time of ED Arrival to Time of ED Departure - No data to display    Observation Course   The patient was found to have a psychiatric condition that would benefit from an observation stay in the emergency department for further psychiatric stabilization and/or coordination of a safe disposition. The plan upon observation admission included serial assessments of psychiatric condition, potential administration of medications if indicated, further disposition pending the patient's psychiatric course during the monitoring " period.     Serial assessments of the patient's psychiatric condition were performed. Nursing notes were reviewed. During the observation period, the patient did not require medications for agitation, and did not require restraints/seclusion for patient and/or provider safety.     After a period of working with the treatment team on the [facility] unit, the patient's mental state improved to allow a safe transition to outpatient care. After counseling on the diagnosis, work-up, and treatment plan, the patient was discharged. Close follow-up with a psychiatrist and/or therapist was recommended and community psychiatric resources were provided. Patient is to return to the ED if any urgent or potentially life-threatening concerns.     Discharge Diagnoses:   Final diagnoses:   Suicidal ideation   Attention deficit hyperactivity disorder (ADHD), unspecified ADHD type   Autism spectrum disorder   Moderate episode of recurrent major depressive disorder (H)       Treatment Plan:  - Continue fluoxetine 40mg targeting depression- schedule this for morning dosing as taking it at bedtime can contribute to disrupted sleep   -Continue Abilify 10mg daily for antidepressant augmentation and mood stability   -Continue prazosin 4mg at bedtime for nightmares/sleep  -Medication education provided this visit including but not limited to: Rationale for medication, importance of medication adherence, medication interactions, common medication side effects, benefits of medications.  -Individual psychotherapy and outpatient psychiatric care recommended for additional support and ongoing development of nonpharmacologic coping skills and strategies.    -Anticipate resuming outpatient medication management appointments and therapy upon discharge  -Patient schedule for programmatic care intake on 9/30/24   -Discharge back to group home.  - Return if symptoms worsen.    At the time of discharge, the patient's acute suicide risk was determined to be  low due to the following factors: Reduction in the intensity of mood/anxiety symptoms that preceded the admission, denial of suicidal thoughts, denies feeling helpless or helpless, not currently under the influence of alcohol or illicit substances, denies experiencing command hallucinations, no immediate access to firearms. The patient's acute risk could be higher if noncompliant with their treatment plan, medications, follow-up appointments or using illicit substances or alcohol. Protective factors include: social supports, stable housing, and a desire to work toward mental health and wellness.    I spent more than 30 minutes on discharge day activities.    --  YUDITH Oliva CNP  [facility] EMERGENCY DEPT  [facility] Unit      Rosette Oseguera APRN CNP  10/11/24 0635

## 2024-09-29 NOTE — PROGRESS NOTES
Patient agrees to discharge plan. Discharge instructions reviewed with patient including follow-up care plan. Medications: adjustments made with new order sent patient's Pharmacy. Reviewed safety plan and outpatient resources. Admits passive SI but with no plan and no HI. All belongings that were brought into the hospital have been returned to patient. Escorted off the unit at door six accompanied by [facility] staff. Discharged to same group home via [facility]. Pt AVS dicussed with him and he verbalized understanding. AVS sent with patient.

## 2024-09-29 NOTE — PROGRESS NOTES
Pt spent the rest of this shift resting in his recliner watching television. Pt was compliant with medications and cares. Plan is to stay obs and reassess in the morning.

## 2024-09-30 ENCOUNTER — TELEPHONE (OUTPATIENT)
Dept: BEHAVIORAL HEALTH | Facility: CLINIC | Age: 29
End: 2024-09-30
Payer: COMMERCIAL

## 2024-09-30 ENCOUNTER — HOSPITAL ENCOUNTER (EMERGENCY)
Facility: CLINIC | Age: 29
Discharge: HOME | End: 2024-10-01
Attending: EMERGENCY MEDICINE | Admitting: EMERGENCY MEDICINE
Payer: COMMERCIAL

## 2024-09-30 DIAGNOSIS — R45.851 SUICIDAL IDEATION: ICD-10-CM

## 2024-09-30 DIAGNOSIS — J02.0 STREP PHARYNGITIS: ICD-10-CM

## 2024-09-30 PROCEDURE — 87637 SARSCOV2&INF A&B&RSV AMP PRB: CPT | Performed by: EMERGENCY MEDICINE

## 2024-09-30 PROCEDURE — 87651 STREP A DNA AMP PROBE: CPT | Performed by: EMERGENCY MEDICINE

## 2024-09-30 PROCEDURE — 99283 EMERGENCY DEPT VISIT LOW MDM: CPT

## 2024-09-30 ASSESSMENT — ACTIVITIES OF DAILY LIVING (ADL)
ADLS_ACUITY_SCORE: 37
ADLS_ACUITY_SCORE: 37

## 2024-09-30 ASSESSMENT — COLUMBIA-SUICIDE SEVERITY RATING SCALE - C-SSRS
3. HAVE YOU BEEN THINKING ABOUT HOW YOU MIGHT KILL YOURSELF?: YES
6. HAVE YOU EVER DONE ANYTHING, STARTED TO DO ANYTHING, OR PREPARED TO DO ANYTHING TO END YOUR LIFE?: YES
5. HAVE YOU STARTED TO WORK OUT OR WORKED OUT THE DETAILS OF HOW TO KILL YOURSELF? DO YOU INTEND TO CARRY OUT THIS PLAN?: NO
2. HAVE YOU ACTUALLY HAD ANY THOUGHTS OF KILLING YOURSELF IN THE PAST MONTH?: YES
4. HAVE YOU HAD THESE THOUGHTS AND HAD SOME INTENTION OF ACTING ON THEM?: NO
1. IN THE PAST MONTH, HAVE YOU WISHED YOU WERE DEAD OR WISHED YOU COULD GO TO SLEEP AND NOT WAKE UP?: YES

## 2024-09-30 NOTE — TELEPHONE ENCOUNTER
"Pt is scheduled for an 11:30AM virtual appointment with provider today, 9/30. Pt did not log onto virtual platform. Provider called pt's listed phone # - [phone] - and the number continues to ring; no VM available. Pt attempted to call intake to connect with provider and left phone # of [phone]. When provider called this #, it was answered by an answering machine for \"[employer]\", so provider was unable to leave a VM.  Provider relayed their direct # to intake for pt to call if they reached out again.      Candy VILLEGAS, LICSW  "

## 2024-10-01 VITALS
HEART RATE: 88 BPM | SYSTOLIC BLOOD PRESSURE: 121 MMHG | TEMPERATURE: 97.5 F | RESPIRATION RATE: 16 BRPM | OXYGEN SATURATION: 98 % | DIASTOLIC BLOOD PRESSURE: 92 MMHG

## 2024-10-01 LAB
FLUAV RNA SPEC QL NAA+PROBE: NEGATIVE
FLUBV RNA RESP QL NAA+PROBE: NEGATIVE
GROUP A STREP BY PCR: DETECTED
RSV RNA SPEC NAA+PROBE: NEGATIVE
SARS-COV-2 RNA RESP QL NAA+PROBE: NEGATIVE

## 2024-10-01 PROCEDURE — 250N000013 HC RX MED GY IP 250 OP 250 PS 637: Performed by: EMERGENCY MEDICINE

## 2024-10-01 RX ORDER — LANOLIN ALCOHOL/MO/W.PET/CERES
3 CREAM (GRAM) TOPICAL
Status: DISCONTINUED | OUTPATIENT
Start: 2024-10-01 | End: 2024-10-01 | Stop reason: HOSPADM

## 2024-10-01 RX ORDER — OLANZAPINE 5 MG/1
10 TABLET, ORALLY DISINTEGRATING ORAL 2 TIMES DAILY PRN
Status: DISCONTINUED | OUTPATIENT
Start: 2024-10-01 | End: 2024-10-01 | Stop reason: HOSPADM

## 2024-10-01 RX ORDER — ACETAMINOPHEN 325 MG/1
650 TABLET ORAL EVERY 4 HOURS PRN
Status: DISCONTINUED | OUTPATIENT
Start: 2024-10-01 | End: 2024-10-01 | Stop reason: HOSPADM

## 2024-10-01 RX ORDER — HYDROXYZINE HYDROCHLORIDE 25 MG/1
25 TABLET, FILM COATED ORAL EVERY 4 HOURS PRN
Status: DISCONTINUED | OUTPATIENT
Start: 2024-10-01 | End: 2024-10-01 | Stop reason: HOSPADM

## 2024-10-01 RX ADMIN — AMOXICILLIN AND CLAVULANATE POTASSIUM 1 TABLET: 875; 125 TABLET, FILM COATED ORAL at 00:37

## 2024-10-01 ASSESSMENT — ACTIVITIES OF DAILY LIVING (ADL)
ADLS_ACUITY_SCORE: 37

## 2024-10-01 NOTE — ED NOTES
RN ED Mental Health Handoff Note    DIOR    Does patient require 1:1? Yes    Hold and rights been given and documented for patient: Yes    Is the patient in  scrubs? No    Has the patient been searched? Yes    Is the 15 minute observation tool up to date? Yes    Was patient issued a welcome folder? No     Room check completed this shift: Yes    PSS3 and [place] Assessment/Reassessment this shift:    C-SSRS ([place])      Date and Time Q1 Wished to be Dead (Past Month) Q2 Suicidal Thoughts (Past Month) Q3 Suicidal Thought Method Q4 Suicidal Intent without Specific Plan Q5 Suicide Intent with Specific Plan Q6 Suicide Behavior (Lifetime) If yes to Q6, within past 3 months? Level of Risk per Screen Level of Risk per Screen User   09/30/24 2143 1-->yes 1-->yes 1-->yes 0-->no 0-->no 1-->yes -- -- moderate risk MO            Behavioral status of patient: Green    Code 21 called this shift? No    Use of restraints/seclusion this shift? No    Most recent vital signs:  Temp: 97.5  F (36.4  C) Temp src: Oral BP: (!) 139/90 Pulse: (!) 133   Resp: 20 SpO2: 96 %        Medications:  Scheduled medication compliance? Yes    PRN Meds administered this shift? No    Medications   amoxicillin-clavulanate (AUGMENTIN) 875-125 MG per tablet 1 tablet (1 tablet Oral $Given 10/1/24 0037)         ADLs    Meal Provided this shift? No    Hygiene items provided? No    ADLs completed? Yes    Date of last shower: unknown    Any significant events this shift? No    Any information that would be helpful in caring for this patient?      Family present/updated? No    Location of patient's belongings: DEC office    Critical Care Minutes:  Does the patient need critical care minutes documented? No

## 2024-10-01 NOTE — ED PROVIDER NOTES
Pj Tucker was signed out to me pending DEC assessment. Please see Dr. Rodas's note for details. DEC has cleared the patient. No current SI/HI. Incidentally strep positive, home with abx and outpatient follow-up. Discharge instructions per Dr. Rodas.      Birgit Pham MD  10/01/24 0710

## 2024-10-01 NOTE — CONSULTS
"Diagnostic Evaluation Consultation  Crisis Assessment    Patient Name: Pj Tucker  Age:  29 year old  Legal Sex: male  Gender Identity: male  Pronouns:   Race: White  Ethnicity: Not  or   Language: English    Patient was assessed: Virtual: [employer]   Crisis Assessment Start Date: 10/01/24  Crisis Assessment Start Time: 0626  Crisis Assessment Stop Time: 0701  Patient location: [facility] EMERGENCY DEPT                              Referral Data and Chief Complaint  Pj Tucker presents to the ED via EMS. Patient is presenting to the ED for the following concerns: Suicidal ideation.   Factors that make the mental health crisis life threatening or complex are:  Pt reports he returns to ED for suicidal ideation from his group home. He states his sexuality, and his finances stress him out. He is trying to figure out \"if he is bi or grier and has been dealing with this for the past 2-3 years\". He states he doesn't make enough money to pay for rent, MA, and other personal items he would like. He is on a program now that is helping him figure out a budget and manage his money. He states he has been on this program for about a year now. Pt denied any active or current plan, intent or thoughts to harm himself. Pt denied any HI or NSSI. Pt states he would like to go home and rest knowing he recently tested positive for  strep . Pt reports he is able to use his safety plan that was updated during his assessment this morning.    Informed Consent and Assessment Methods  Explained the crisis assessment process, including applicable information disclosures and limits to confidentiality, assessed understanding of the process, and obtained consent to proceed with the assessment.  Assessment methods included conducting a formal interview with patient, review of medical records, collaboration with medical staff, and obtaining relevant collateral information from family and community providers when " available.  : done     Patient response to interventions: acceptance expressed, verbalizes understanding  Coping skills were attempted to reduce the crisis:  Comes back to ED with SI, video games, talking to family and friends and boyfriend. Taking a shower, getting out of bedroom and talking with people in the house to decrease isolation.     History of the Crisis   Pt has a therapist Mindy; next session is tomorrow. Psychiatrist is new, will schedule follow up with this provider. Takes medications as prescribed, Prozac increased last Wednesday, and he could not handle this so on Saturday it went back to previous dose.    Brief Psychosocial History  Family:  Single, Children no  Support System:  Parent(s), Facility resident(s)/Staff, Friend, Significant Other, Sibling(s) (Therapist)  Employment Status:  employed part-time (Monday, Wednesday and Friday)  Source of Income:  salary/wages  Financial Environmental Concerns:  unable to afford rent/mortgage  Current Hobbies:  games, music, outdoor activities, social media/computer activities, sports/team sports, television/movies/videos, travel (Disc Golf, Video games and watch sports.)  Barriers in Personal Life:  emotional concerns, lack of motivation, financial concerns    Significant Clinical History  Current Anxiety Symptoms:  anxious, excessive worry  Current Depression/Trauma:  helplessness, difficulty concentrating, low self esteem, thoughts of death/suicide, impaired decision making, withdrawl/isolation  Current Somatic Symptoms:   (None endorsed, pt tired and laying with closed eyes on bed)  Current Psychosis/Thought Disturbance:   (None endorsed currently)  Current Eating Symptoms:  loss of appetite, increased appetite (Increase and decrease appetite)  Chemical Use History:  Alcohol: Social (Quantity varies.)  Last Use: 09/21/24  Benzodiazepines: None  Opiates: None  Cocaine: None  Marijuana: None  Other Use: None  Withdrawal Symptoms:  (None  endorsed)  Addictions:  (None endorsed)   Past diagnosis:  ADHD, Autism, Depression  Family history:  Autism, Bipolar Disorder  Past treatment:  Individual therapy, Case management, Psychiatric Medication Management, Inpatient Hospitalization  Details of most recent treatment:  Pt is followed by OP therapist and psychiatrist see above. Pt has gone through an IRTS program in the past. Last April pt was hospitalized for 3 weeks at [facility] for stabilization.  Other relevant history:  Pt denied any legal issues.    Collateral Information  Is there collateral information:       Collateral information name, relationship, phone number:  Katerine Rowe, group home nurse manager, #[phone]    What happened today: Didn't know he came to ED.     What is different about patient's functioning: Going to ED a lot, nothing seems to be working.     Has patient made comments about wanting to kill themselves/others: yes    If d/c is recommended, can they take part in safety/aftercare planning:yes    Additional collateral information:  Would like pt to buy a cab home.     Risk Assessment  [place] Suicide Severity Rating Scale Full Clinical Version:  Suicidal Ideation  Q1 Wish to be Dead (Lifetime): Yes  Q2 Non-Specific Active Suicidal Thoughts (Lifetime): Yes  3. Active Suicidal Ideation with any Methods (Not Plan) Without Intent to Act (Lifetime): Yes  Q4 Active Suicidal Ideation with Some Intent to Act, Without Specific Plan (Lifetime): No  Q5 Active Suicidal Ideation with Specific Plan and Intent (Lifetime): No  Q6 Suicide Behavior (Lifetime): yes     Suicidal Behavior (Lifetime)  Actual Attempt (Lifetime): No  Has subject engaged in non-suicidal self-injurious behavior? (Lifetime): No  Interrupted Attempts (Lifetime): No  Aborted or Self-Interrupted Attempt (Lifetime): No  Preparatory Acts or Behavior (Lifetime): No    [place] Suicide Severity Rating Scale Recent:   Suicidal Ideation (Recent)  Q1 Wished to be Dead (Past Month):  yes  Q2 Suicidal Thoughts (Past Month): no  Q3 Suicidal Thought Method: no  Q4 Suicidal Intent without Specific Plan: no  Q5 Suicide Intent with Specific Plan: no  If yes to Q6, within past 3 months?: no  Level of Risk per Screen: moderate risk  Intensity of Ideation (Recent)  Most Severe Ideation Rating (Past 1 Month): 3  Description of Most Severe Ideation (Past 1 Month): None currently  Frequency (Past 1 Month): 2-5 times in week  Duration (Past 1 Month): Fleeting, few seconds or minutes  Controllability (Past 1 Month): Can control thoughts with little difficulty  Deterrents (Past 1 Month): Deterrents definitely stopped you from attempting suicide  Reasons for Ideation (Past 1 Month): Does not apply  Suicidal Behavior (Recent)  Actual Attempt (Past 3 Months): No  Total Number of Actual Attempts (Past 3 Months): 0  Has subject engaged in non-suicidal self-injurious behavior? (Past 3 Months): No  Interrupted Attempts (Past 3 Months): No  Total Number of Interrupted Attempts (Past 3 Months): 0  Aborted or Self-Interrupted Attempt (Past 3 Months): No  Total Number of Aborted or Self-Interrupted Attempts (Past 3 Months): 0  Preparatory Acts or Behavior (Past 3 Months): No  Total Number of Preparatory Acts (Past 3 Months): 0    Environmental or Psychosocial Events: challenging interpersonal relationships, excessive debt, poor finances  Protective Factors: Protective Factors: strong bond to family unit, community support, or employment, lives in a responsibly safe and stable environment, good treatment engagement, supportive ongoing medical and mental health care relationships, help seeking    Does the patient have thoughts of harming others? Feels Like Hurting Others: no  Previous Attempt to Hurt Others: no  Current presentation:  (Pt denied)  Is the patient engaging in sexually inappropriate behavior?: no    Is the patient engaging in sexually inappropriate behavior?  no        Mental Status Exam   Affect: Other  (Tried, sleeping on and off)  Appearance: Disheveled  Attention Span/Concentration: Attentive  Eye Contact: Other (please comment) (Closed)    Fund of Knowledge: Appropriate   Language /Speech Content: Fluent  Language /Speech Volume: Normal  Language /Speech Rate/Productions: Normal  Recent Memory: Intact  Remote Memory: Intact  Mood: Normal  Orientation to Person: Yes   Orientation to Place: Yes  Orientation to Time of Day: Yes  Orientation to Date: Yes     Situation (Do they understand why they are here?): Yes  Psychomotor Behavior: Normal  Thought Content: Other (please comment) (Chronic suicidal thoughts, no active plans, intent or thoughts to harm himself currently.)  Thought Form: Intact      Medication  Psychotropic medications: See chart     Current Care Team  Patient Care Team:  Clinic, [facility] as PCP - General (Certified Registered)    Diagnosis  Patient Active Problem List   Diagnosis Code    Major depressive disorder, recurrent episode, moderate (H) F33.1    Suicidal ideation R45.851    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    Active autistic disorder F84.0    Obesity due to excess calories, unspecified obesity severity E66.09    Idiopathic scoliosis and kyphoscoliosis M41.20    Encephalopathy, unspecified G93.40    Attention deficit disorder F98.8    Autism spectrum disorder F84.0    Attention deficit hyperactivity disorder (ADHD), unspecified ADHD type F90.9    Moderate episode of recurrent major depressive disorder (H) F33.1     Primary Problem This Admission  Active Hospital Problems  F33.1  Major depressive disorder, recurrent episode, moderate (H)    Clinical Summary and Substantiation of Recommendations   After therapeutic assessment, intervention, and aftercare planning by ED care team and LMHP and in consultation with attending provider, the patient's circumstances and mental state were appropriate for outpatient management. It is the recommendation of this clinician that pt discharge with  OP  support. Currently the pt is not presenting as an acute risk to self or others due to the following factors: Pt denied any active thoughts, intent or plans to harm himself. Pt denied any HI or NSSI. Pt states he has strep, was given medications in the ED and would like to go home now. Safety plan reviewed and updated. Pt states he feels he is safe and can be safe discharging back to group home.    Patient coping skills attempted to reduce the crisis:  Comes back to ED with SI, video games, talking to family and friends and boyfriend. Taking a shower, getting out of bedroom and talking with people in the house to decrease isolation.    Disposition  Recommended disposition: Individual Therapy, Medication Management        Reviewed case and recommendations with attending provider. Attending Name: Dr Pham       Attending concurs with disposition: yes       Patient and/or validated legal guardian concurs with disposition: yes       Final disposition:  discharge    Legal status on admission:      Assessment Details   Total duration spent with the patient: 36 min     CPT code(s) utilized: 89871 - Psychotherapy for Crisis - 60 (30-74*) min    Jayshree Gil [facility], Psychotherapist  DEC - Triage & Transition Services  Callback: [phone]

## 2024-10-01 NOTE — ED PROVIDER NOTES
Emergency Department Note      History of Present Illness   Chief Complaint   Suicidal    HPI   Pj Tucker is a 29 year old male with a history of depression, suicidal ideation and autism who presents via EMS for an evaluation of suicidal ideations. The patient stated he wants to jump off a bridge. He added it's a lot of everything that is causing the depression. He stated also having trouble swallowing, sore throat, feeling warm and some nausea that started today. He added he drools a lot. He denies taking pills, recreational drugs or drinking alcohol. He also denies missing a dose of his depression medications. He denies cough, runny nose, vomiting and diarrhea.     Independent Historian   None    Review of External Notes   I reviewed his 3 visits since September 22 for suicidal ideation.  Past Medical History   Medical History and Problem List   ADD  Depression  Suicidal ideation  Shprintzen syndrome  Active autistic disorder  Encephalopathy  ADHD  Idiopathic scoliosis and kyphoscoliosis   Hematemesis   Epistaxis  Anemia     Medications   Aripiprazole  Fluoxetine  Hydroxyzine  Prazosin  Metformin     Surgical History   Back surgery   Physical Exam   Patient Vitals for the past 24 hrs:   BP Temp Temp src Pulse Resp SpO2   09/30/24 2139 (!) 139/90 97.5  F (36.4  C) Oral (!) 133 20 96 %     Physical Exam  Constitutional: Vital signs reviewed as above  General: Alert  HEENT: Moist mucous membranes. Erythema with tonsillar excalate, no trismus.   Eyes: Conjunctiva normal.   Neck: Normal range of motion  Cardiovascular: Regular rate, Regular rhythm and normal heart sounds.  No MRG. Tachycardic.   Pulmonary/Chest: Effort normal and breath sounds normal. No respiratory distress. Patient has no wheezes. Patient has no rales.   Abdominal: Soft. Positive bowel sounds. No MRG.  Musculoskeletal/Extremities: Full ROM.  Endo: No pitting edema  Neurological: Alert, no focal deficits.  Skin: Skin is warm and dry.    Psychiatric: Pleasant. Admits to suicidal ideation with plan to jump off a bridge.   Diagnostics   Lab Results   Labs Ordered and Resulted from Time of ED Arrival to Time of ED Departure   GROUP A STREPTOCOCCUS PCR THROAT SWAB - Abnormal       Result Value    Group A strep by PCR Detected (*)    INFLUENZA A/B, RSV, & SARS-COV2 PCR - Normal    Influenza A PCR Negative      Influenza B PCR Negative      RSV PCR Negative      SARS CoV2 PCR Negative         Imaging   No orders to display       Independent Interpretation   None  ED Course    Medications Administered   Medications   acetaminophen (TYLENOL) tablet 650 mg (has no administration in time range)   hydrOXYzine HCl (ATARAX) tablet 25 mg (has no administration in time range)   OLANZapine zydis (zyPREXA) ODT tab 10 mg (has no administration in time range)   melatonin tablet 3 mg (has no administration in time range)   amoxicillin-clavulanate (AUGMENTIN) 875-125 MG per tablet 1 tablet (1 tablet Oral $Given 10/1/24 0037)       Procedures   Procedures     Discussion of Management   Patient will be evaluated by the DEC     ED Course   ED Course as of 10/01/24 0614   Mon Sep 30, 2024   2306 I obtained history and examined the patient as noted above.    Tue Oct 01, 2024   0032 I rechecked and updated the patient. I informed the patient of his strep throat.      Additional Documentation  None  Medical Decision Making / Diagnosis     MIPS       None    MDM   Pj Tucker is a 29 year old male who presents emergency department with suicidal ideation.  When asked if he had a plan he hesitates and says jumping off a bridge.  I do note he has been seen in the emergency department now 4 times in the past 10 days for the same thing.  Additionally he says his throat has been sore.  I did swab him for strep, COVID, influenza and RSV and he does have strep.  I gave him his first dose of Augmentin here.  Unfortunately DEC has not been available this evening and will  see him this morning.  I suspect he will be discharged home as he was like this is a common theme for him.  He has been resting comfortably and will be reevaluated and signed out to my colleague, Dr. Pham pending DEC evaluation.    Disposition   The patient will board in the emergency department pending DEC evaluation. Care was signed out to Dr. Pham.     Diagnosis     ICD-10-CM    1. Suicidal ideation  R45.851       2. Strep pharyngitis  J02.0            Discharge Medications   New Prescriptions    AMOXICILLIN-CLAVULANATE (AUGMENTIN) 875-125 MG TABLET    Take 1 tablet by mouth 2 times daily for 7 days.     Scribe Disclosure:  I, Anastasiia Cárdenas, am serving as a scribe at 12:53 AM on 10/1/2024 to document services personally performed by Chance Maddox MD based on my observations and the provider's statements to me.      Chance Maddox MD  10/01/24 0614       Chance Maddox MD  10/01/24 0701

## 2024-10-01 NOTE — DISCHARGE INSTRUCTIONS
Upcoming Appointments:   Appointment Information   Name: Pj Tucker MRN: [id number]     Date: 10/17/2024 Status: Scheduled     Time: 12:30 PM Length: 120     Visit Type: ADULT [facility] [2693] Copay: $0.00     Provider: Princess Howell LICSW Department:  ADULT/[facility]     Encounter #: [id number] Notes: Phone:[phone]  Email:[email or web address]       Appointment Instructions   Patient Instructions    Visit Type: ADULT MH EVAL  Please Note: This is a virtual visit; there is no need to come to the facility.     Please have a list of all current medications available for appointment.      If you get health care at a clinic, emergency room, or other place in a hospital, you may get more than one bill. For example, you may get a bill(s) from [facility] for facility charges such as testing, medicines and other services but you could also receive a separate bill(s) for professional charges from any doctors or other providers who treated you. Sometimes, one bill will contain both facility and professional charges.     Under State and Federal Law; healthcare facilities must inform each patient of their Patient Rights.  This assures that the healthcare system is fair, and it works to meet patients' needs.  If you d like to read of copy of our Patient Bill of Rights, [email or web address]      Learn more about your rights and protections against surprise medical bills on [email or web address]             Panel:      Reason for early arrival:      Directions to Department    Department Location: The Assessment Center is located at the [facility], [facility], on the [facility].    Please come to the [facility] entrance near the Emergency Department.  Follow the signs to the Emergency Room and park in the RED or YELLOW parking ramp.  Enter in the [facility].  The Assessment Center is next to [address].    -If getting a ride,  please request drop off at the address above.  -If driving, discounted parking is available in the Red or Yellow ramp across from the [facility] entrance.    Our reception area is conveniently located as you come into the [facility] in [address], next door to [address].  Please bring a current list of medication and contact information for your other providers.    IMPORTANT: If you need to change your appointment, please call Behavioral Access [phone].    Please note, we do ask for a minimum of a 24-hour notice for canceled or rescheduled appointments.    This appointment is in a hospital-based location.  Before your visit, you may want to check with your insurance company for coverage and referral options, including cost differences between services provided in different clinic settings.  For more information visit this link on the [employer] Website:  [email or web address]   From Sign In Desk:      Department Address: [address] [place] [id number]    Department Phone: [phone]

## 2024-10-01 NOTE — ED TRIAGE NOTES
Arrives via EMS. Reports thoughts of self harm, has plan,. Lives in group home. Tachy otherwise stable vitals. Denies pain.      Triage Assessment (Adult)       Row Name 09/30/24 2410          Triage Assessment    Airway WDL WDL        Respiratory WDL    Respiratory WDL WDL        Skin Circulation/Temperature WDL    Skin Circulation/Temperature WDL WDL        Cardiac WDL    Cardiac WDL WDL        Peripheral/Neurovascular WDL    Peripheral Neurovascular WDL WDL        Cognitive/Neuro/Behavioral WDL    Cognitive/Neuro/Behavioral WDL mood/behavior;X     Mood/Behavior anxious